# Patient Record
Sex: MALE | Race: WHITE | NOT HISPANIC OR LATINO | Employment: FULL TIME | URBAN - METROPOLITAN AREA
[De-identification: names, ages, dates, MRNs, and addresses within clinical notes are randomized per-mention and may not be internally consistent; named-entity substitution may affect disease eponyms.]

---

## 2018-10-25 ENCOUNTER — TRANSFERRED RECORDS (OUTPATIENT)
Dept: HEALTH INFORMATION MANAGEMENT | Facility: CLINIC | Age: 53
End: 2018-10-25

## 2021-09-15 NOTE — PROGRESS NOTES
ASSESSMENT AND PLAN:     COUNSELING:   Reviewed preventive health counseling, as reflected in patient instructions       Regular exercise       Healthy diet/nutrition       Consider Hep C screening for all patients one time for ages 18-79 years       Prostate cancer screening    (Z00.00) Visit for well man health check  (primary encounter diagnosis)  Comment: Age appropriate screening and preventive services provided.   Discussed risks and benefits of prostate cancer screening with PSA including overdiagnosis risks and risk of missing a prostate cancer diagnosis. Will screen today.     Will request [place] records (for some reason not available in Care Everywhere) to determine what vaccines he needs, colon cancer screening.   Plan: Lipid panel reflex to direct LDL Fasting,         Glucose, Hepatitis C Screen Reflex to HCV RNA         Quant and Genotype, HIV Antigen Antibody Combo,        PSA tumor marker          (Z23) Need for prophylactic vaccination and inoculation against influenza  Comment:   Plan: INFLUENZA QUAD, RECOMBINANT, P-FREE (RIV4)         (FLUBLOK), CANCELED: FLU VACCINE, 3 YRS +, IM          [92743]          (G62.9) Neuropathy  Comment: Stable. Has previously seen neurology for this. Consistent for sensory symmetric polyneuropathy. Will get previous records of evaluation.   Plan:     (E78.00) Hypercholesteremia  Comment: Update lipids. Continue current regimen for now.   Plan: rosuvastatin (CRESTOR) 10 MG tablet          (M20.41) Hammertoe of right foot  Comment: Discussed going back to podiatry to readdress need for custom orthotics and/or surgery. Deformity is definitely impacting his activity level and the frequent skin breakdown puts him at risk for infection. For now, he wishes to continue as he has been. I will continue to readdress this at subsequent visits.   Plan:     (N52.9) Erectile dysfunction, unspecified erectile dysfunction type  Comment: Stable. Refilled. Encouraged him to try lower  "dose of sildenafil to see if it is as effective without causing headaches.   Plan: sildenafil (REVATIO) 20 MG tablet          (K21.9) Gastroesophageal reflux disease without esophagitis  Comment: Stable. Rare use of PPI.   Plan:     Bala Villasenor MD  [facility]  09/17/2021, 11:33 AM      SUBJECTIVE:   Markell is a 55 year old male who presents to clinic today to establish care and for annual wellness exam.    # Right, Hammertoe  - looses big toe nail  - has seen podiatry in the past  - has been offered surgical repair  - wears good shoes with open toebox  - uses vaseline on toes to reduce friction before long walks or else he gets a blister  - hasn't used custom orthotics    # Neuropathy  - bottom of both feet  - going on for about 10 years  - has had extensive evaluation in the past without a clear cause  - sometimes on brisk walks if he stops, it will feel more tingly  - sometimes when walking will \"catch\" his right foot while walking - [place]  - takes folic acid for this  - seems to have improved recently    # GERD  - symptoms only every couple of months  - takes omeprazole as needed with good relief    # ED  - takes sildenafil 60mg (3x 20mg)  - sometimes gets a headache with it  - has never tried a lower dose    # Scalp bump  - cline noticed it  - no history of skin cancer  - no family history of skin cancer     # Health Maintenance  - HIV Screening: do today  - Hep C Screening: do today  - BP:   BP Readings from Last 3 Encounters:   09/17/21 127/76   - Cholesterol: update today  - Diabetes Screening: has been told he has elevated blood sugars in the past  - PSA: do today  - Colonoscopy: will get records    - Exercise: mostly walking, 20-30 minutes at a time about 5 days a week  - hammertoes limit the intensity of the walks, if able likes to walk briskly enough to sweat  - has stationary bike at home      Today's PHQ-2 Score:   PHQ-2 ( 1999 Pfizer) 9/17/2021   Q1: " Little interest or pleasure in doing things 0   Q2: Feeling down, depressed or hopeless 0   PHQ-2 Score 0     Review of Systems:   Constitutional - no fevers, chills, night sweats, unintentional weight loss/gain   Eyes - no vision concerns   Ears/Nose/Throat - no hearing concerns, no dysphagia/odynophagia   Cardiovascular - no chest pain, palpitations   Pulmonary - no shortness of breath, wheezing, coughing   GI - no abdominal pain, constipation, diarrhea, nausea, vomiting    - no dysuria, polyuria, hematuria   Musculoskeletal - has had RTC repair bilaterally, doing well with them.. Occasional low back pain - keeps him from running. no joint or muscle pain  Integument - no rash   Neuro - as above   Heme - no easy bruising/bleeding   Endocrine - no polyuria, weight loss/gain, dry skin, excessive sweating, hair loss   Psychiatric - no feelings of depressed mood or anhedonia in past 2 weeks   Allergic/Immunologic - no history of anaphylaxis, no history of recurrent infections    Past Medical History:   Diagnosis Date     ED (erectile dysfunction)      Gastroesophageal reflux disease without esophagitis      Hammertoe of right foot      Hypercholesteremia      Neuropathy      Past Surgical History:   Procedure Laterality Date     DISCECTOMY LUMBAR MINIMALLY INVASIVE ONE LEVEL  2000     ROTATOR CUFF REPAIR RT/LT Left      ROTATOR CUFF REPAIR RT/LT Right      Family History   Problem Relation Age of Onset     Hammer toes Mother      Hyperlipidemia Father      Insulin Resistance Father      Hyperlipidemia Paternal Grandfather      Diabetes Type 2  Paternal Uncle      Peripheral Neuropathy Paternal Uncle      Coronary Artery Disease No family hx of      Cerebrovascular Disease No family hx of      Prostate Cancer No family hx of      Colon Cancer No family hx of      Social History     Tobacco Use     Smoking status: Never Smoker     Smokeless tobacco: Never Used   Vaping Use     Vaping Use: Never used   Substance Use  "Topics     Alcohol use: Yes     Comment: Once a week, 2-3 beers at a time     Drug use: Never     Social History     Social History Narrative    Recently moved to [place] from [place]    Currently with daughter while house being rennovated    Works in supply chain distribution, works remotely       Current Outpatient Medications   Medication     Ascorbic Acid (VITAMIN C PO)     Cholecalciferol (VITAMIN D3 PO)     Coenzyme Q10 (COQ10 PO)     FOLIC ACID PO     Glucosamine HCl (GLUCOSAMINE PO)     Multiple Vitamins-Minerals (ZINC PO)     Omega-3 Fatty Acids (FISH OIL PO)     rosuvastatin (CRESTOR) 10 MG tablet     sildenafil (REVATIO) 20 MG tablet     No current facility-administered medications for this visit.     I have reviewed the patient's past medical, surgical, family, and social history.     OBJECTIVE:   /76 (BP Location: Right arm, Patient Position: Sitting, Cuff Size: Adult Large)   Pulse 56   Temp 97.1  F (36.2  C) (Skin)   Resp 13   Ht 1.965 m (6' 5.36\")   Wt 108.3 kg (238 lb 12 oz)   SpO2 96%   BMI 28.05 kg/m      Constitutional: well-appearing, appears stated age  Eyes: conjunctivae without erythema, sclera anicteric. Pupils equal, round, and reactive to light.   ENT: oropharynx clear, TM grey bilateral  Cardiac: regular rate and rhythm, normal S1/S2, no murmur/rubs/gallops  Respiratory: lungs clear to auscultation bilaterally, normal work of breathing, no wheezes/crackles  GI: abdomen soft, non-tender, non-distended  Extremities: warm and well perfused, radial pulses 2+ and equal, cap refill brisk.  Hammertoe deformity on right, most prominent on 1st-3rd toes, nail deformity on these toes as well  Lymph: no cervical or supraclavicular lymphadenopathy  Skin: approx 5mm tan papule on left anterior scalp - symmetric, even coloring throughout, crisp border, has hairs growing through it.   Psych: affect is full and appropriate, speech is fluent and non-pressured  Neuro: dorsiflexion of ankles 5/5 " and symmetric  Absent sensation with paperclip below distal tib/fib

## 2021-09-17 ENCOUNTER — OFFICE VISIT (OUTPATIENT)
Dept: FAMILY MEDICINE | Facility: CLINIC | Age: 56
End: 2021-09-17
Payer: COMMERCIAL

## 2021-09-17 VITALS
BODY MASS INDEX: 28.19 KG/M2 | DIASTOLIC BLOOD PRESSURE: 76 MMHG | HEIGHT: 77 IN | HEART RATE: 56 BPM | WEIGHT: 238.75 LBS | SYSTOLIC BLOOD PRESSURE: 127 MMHG | TEMPERATURE: 97.1 F | RESPIRATION RATE: 13 BRPM | OXYGEN SATURATION: 96 %

## 2021-09-17 DIAGNOSIS — N52.9 ERECTILE DYSFUNCTION, UNSPECIFIED ERECTILE DYSFUNCTION TYPE: ICD-10-CM

## 2021-09-17 DIAGNOSIS — M20.41 HAMMERTOE OF RIGHT FOOT: ICD-10-CM

## 2021-09-17 DIAGNOSIS — Z00.00 VISIT FOR WELL MAN HEALTH CHECK: Primary | ICD-10-CM

## 2021-09-17 DIAGNOSIS — G62.9 NEUROPATHY: ICD-10-CM

## 2021-09-17 DIAGNOSIS — Z23 NEED FOR PROPHYLACTIC VACCINATION AND INOCULATION AGAINST INFLUENZA: ICD-10-CM

## 2021-09-17 DIAGNOSIS — E78.00 HYPERCHOLESTEREMIA: ICD-10-CM

## 2021-09-17 DIAGNOSIS — K21.9 GASTROESOPHAGEAL REFLUX DISEASE WITHOUT ESOPHAGITIS: ICD-10-CM

## 2021-09-17 LAB
CHOLEST SERPL-MCNC: 191 MG/DL
FASTING STATUS PATIENT QL REPORTED: YES
FASTING STATUS PATIENT QL REPORTED: YES
GLUCOSE BLD-MCNC: 104 MG/DL (ref 70–99)
HDLC SERPL-MCNC: 59 MG/DL
LDLC SERPL CALC-MCNC: 108 MG/DL
NONHDLC SERPL-MCNC: 132 MG/DL
PSA SERPL-MCNC: 0.64 UG/L (ref 0–4)
TRIGL SERPL-MCNC: 120 MG/DL

## 2021-09-17 PROCEDURE — 86803 HEPATITIS C AB TEST: CPT | Performed by: FAMILY MEDICINE

## 2021-09-17 PROCEDURE — 84153 ASSAY OF PSA TOTAL: CPT | Performed by: FAMILY MEDICINE

## 2021-09-17 PROCEDURE — 87389 HIV-1 AG W/HIV-1&-2 AB AG IA: CPT | Performed by: FAMILY MEDICINE

## 2021-09-17 PROCEDURE — 80061 LIPID PANEL: CPT | Performed by: FAMILY MEDICINE

## 2021-09-17 PROCEDURE — 82947 ASSAY GLUCOSE BLOOD QUANT: CPT | Performed by: FAMILY MEDICINE

## 2021-09-17 RX ORDER — SILDENAFIL CITRATE 20 MG/1
60 TABLET ORAL
Qty: 72 TABLET | Refills: 3 | Status: SHIPPED | OUTPATIENT
Start: 2021-09-17 | End: 2021-10-02

## 2021-09-17 RX ORDER — SILDENAFIL CITRATE 20 MG/1
60 TABLET ORAL
COMMUNITY
End: 2021-09-17

## 2021-09-17 RX ORDER — ROSUVASTATIN CALCIUM 10 MG/1
10 TABLET, COATED ORAL DAILY
Qty: 90 TABLET | Refills: 3 | Status: SHIPPED | OUTPATIENT
Start: 2021-09-17 | End: 2022-09-15

## 2021-09-17 RX ORDER — ROSUVASTATIN CALCIUM 10 MG/1
10 TABLET, COATED ORAL DAILY
COMMUNITY
End: 2021-09-17

## 2021-09-17 ASSESSMENT — MIFFLIN-ST. JEOR: SCORE: 2036.08

## 2021-09-17 NOTE — NURSING NOTE
"55 year old  Chief Complaint   Patient presents with     Physical     Hyperlipidemia     Erectile Dysfunction     Mass     left side top of head     Hammer Toe     right foot     NEUROPATHY     bottom of feet       Blood pressure 127/76, pulse 56, temperature 97.1  F (36.2  C), temperature source Skin, resp. rate 13, height 1.965 m (6' 5.36\"), weight 108.3 kg (238 lb 12 oz), SpO2 96 %. Body mass index is 28.05 kg/m .  There is no problem list on file for this patient.      Wt Readings from Last 2 Encounters:   09/17/21 108.3 kg (238 lb 12 oz)     BP Readings from Last 3 Encounters:   09/17/21 127/76         Current Outpatient Medications   Medication     rosuvastatin (CRESTOR) 10 MG tablet     sildenafil (REVATIO) 20 MG tablet     No current facility-administered medications for this visit.       Social History     Tobacco Use     Smoking status: Never Smoker     Smokeless tobacco: Never Used   Vaping Use     Vaping Use: Never used   Substance Use Topics     Alcohol use: Yes     Drug use: Never       Health Maintenance Due   Topic Date Due     PREVENTIVE CARE VISIT  Never done     ADVANCE CARE PLANNING  Never done     COLORECTAL CANCER SCREENING  Never done     HIV SCREENING  Never done     HEPATITIS C SCREENING  Never done     DTAP/TDAP/TD IMMUNIZATION (1 - Tdap) Never done     LIPID  Never done     ZOSTER IMMUNIZATION (1 of 2) Never done     INFLUENZA VACCINE (1) Never done       No results found for: PAP      September 17, 2021 11:05 AM    "

## 2021-09-17 NOTE — PATIENT INSTRUCTIONS
1) Check to see when you last had your tetanus booster    2) If I haven't messaged you within a month that I've gotten and reviewed your [place] records, I want you to message me about it in [facility]

## 2021-09-20 LAB
HCV AB SERPL QL IA: NONREACTIVE
HIV 1+2 AB+HIV1 P24 AG SERPL QL IA: NONREACTIVE

## 2021-10-18 ENCOUNTER — OFFICE VISIT (OUTPATIENT)
Dept: FAMILY MEDICINE | Facility: CLINIC | Age: 56
End: 2021-10-18
Payer: COMMERCIAL

## 2021-10-18 VITALS
BODY MASS INDEX: 29.09 KG/M2 | SYSTOLIC BLOOD PRESSURE: 137 MMHG | DIASTOLIC BLOOD PRESSURE: 85 MMHG | WEIGHT: 247.6 LBS | OXYGEN SATURATION: 99 % | HEART RATE: 63 BPM

## 2021-10-18 DIAGNOSIS — R30.0 DYSURIA: ICD-10-CM

## 2021-10-18 DIAGNOSIS — N34.2 URETHRITIS: Primary | ICD-10-CM

## 2021-10-18 LAB
ALBUMIN UR-MCNC: NEGATIVE MG/DL
APPEARANCE UR: CLEAR
BILIRUB UR QL STRIP: NEGATIVE
COLOR UR AUTO: YELLOW
GLUCOSE UR STRIP-MCNC: NEGATIVE MG/DL
HGB UR QL STRIP: NEGATIVE
KETONES UR STRIP-MCNC: NEGATIVE MG/DL
LEUKOCYTE ESTERASE UR QL STRIP: NEGATIVE
NITRATE UR QL: NEGATIVE
PH UR STRIP: 5.5 [PH] (ref 5–7)
SP GR UR STRIP: 1.01 (ref 1–1.03)
UROBILINOGEN UR STRIP-ACNC: 0.2 E.U./DL

## 2021-10-18 PROCEDURE — 81003 URINALYSIS AUTO W/O SCOPE: CPT | Performed by: FAMILY MEDICINE

## 2021-10-18 PROCEDURE — 99213 OFFICE O/P EST LOW 20 MIN: CPT | Performed by: FAMILY MEDICINE

## 2021-10-18 RX ORDER — PHENAZOPYRIDINE HYDROCHLORIDE 95 MG/1
190 TABLET ORAL 3 TIMES DAILY PRN
Qty: 30 TABLET | Refills: 0 | Status: SHIPPED | OUTPATIENT
Start: 2021-10-18 | End: 2022-04-27

## 2021-10-18 RX ORDER — SULFAMETHOXAZOLE/TRIMETHOPRIM 800-160 MG
TABLET ORAL
COMMUNITY
Start: 2021-10-12 | End: 2022-04-27

## 2021-10-18 NOTE — PROGRESS NOTES
Assessment & Plan       ICD-10-CM    1. Urethritis  N34.2 phenazopyridine (AZO URINARY PAIN RELIEF) 95 MG tablet   2. Dysuria  R30.0 UA macro with reflex to Microscopic and Culture - Clinc Collect         Review of external notes as documented elsewhere in note          Patient Instructions   Patient Education     Markell    It was a pleasure seeing you in clinic today.  Here's the plan:    1. UTI - repeat UA 1 week after stopping antibiotics if symptoms persist or worsen.  You likely have residual urethral irritation.    Let me know if you have questions.    Ganesh Figueroa MD        Return in about 1 week (around 10/25/2021) for If symptoms persist.    Ganesh Figueroa MD  [facility] XAVI Guillaume is a 56 year old who presents for the following health issues     HPI     Genitourinary - Male  Onset/Duration: 2 weeks  Description:   Dysuria (painful urination): YES}  Hematuria (blood in urine): no  Frequency: no  Waking at night to urinate: YES  Hesitancy (delay in urine): no  Retention (unable to empty): no  Decrease in urinary flow: no  Incontinence: no  Progression of Symptoms:  improving  Accompanying Signs & Symptoms:  Fever: no  Back/Flank pain: no  Urethral discharge: no  Testicle lumps/masses/pain: no  Nausea and/or vomiting: no  Abdominal pain: no  History:   History of frequent UTI s: no  History of kidney stones: no  History of hernias: no  Personal or Family history of Prostate problems: no  Sexually active: YES  Precipitating or alleviating factors: None  Therapies tried and outcome: course of antibiotics - Sulfamethoxazole and Ceftriaxone sodium injection      2 weeks  Irritated feeling in urethra  Urinary frequency  UA in [place] showed wbc - rocephin inj and bactrim tabs  Still has residual irritation  1 week ago, last day of bactrim is tomorrow        Review of Systems   Constitutional, HEENT, cardiovascular, pulmonary, gi and gu systems are negative, except as  otherwise noted.      Objective    /85   Pulse 63   Wt 112.3 kg (247 lb 9.6 oz)   SpO2 99%   BMI 29.09 kg/m    Body mass index is 29.09 kg/m .  Physical Exam   GENERAL: healthy, alert and no distress  EYES: Eyes grossly normal to inspection, PERRL and conjunctivae and sclerae normal  NECK: no adenopathy, no asymmetry, masses, or scars and thyroid normal to palpation  SKIN: no suspicious lesions or rashes  PSYCH: mentation appears normal, affect normal/bright

## 2021-10-18 NOTE — PATIENT INSTRUCTIONS
Patient Education     Markell    It was a pleasure seeing you in clinic today.  Here's the plan:    1. UTI - repeat UA 1 week after stopping antibiotics if symptoms persist or worsen.  You likely have residual urethral irritation.    Let me know if you have questions.    Ganesh Figueroa MD

## 2021-12-03 ENCOUNTER — IMMUNIZATION (OUTPATIENT)
Dept: NURSING | Facility: CLINIC | Age: 56
End: 2021-12-03
Payer: COMMERCIAL

## 2021-12-03 PROCEDURE — 0064A COVID-19,PF,MODERNA (18+ YRS BOOSTER .25ML): CPT

## 2021-12-03 PROCEDURE — 91306 COVID-19,PF,MODERNA (18+ YRS BOOSTER .25ML): CPT

## 2022-04-27 NOTE — PROGRESS NOTES
ASSESSMENT AND PLAN:     (G62.9) Neuropathy  (primary encounter diagnosis)  Comment: Chronic, worsening. Progressively worsening symmetric polyneuropathy. Get EMG/NCS as recommended by previous neurologist.   Plan: EMG (Neurology Clinic)          (B36.0) Tinea versicolor  Comment: New diagnosis. Appearance consistent with tinea versicolor. Differential includes pityriasis rosea but no herald patch, nummular eczema. Treat with topical ketoconazole shampoo - daily until improved and then can use 1-2 times a week to prevent recurrence.   Plan: ketoconazole (NIZORAL) 2 % external shampoo          Review of the result(s) of each unique test - see note body for each test reviewed    Bala Villasenor MD   [facility]  04/28/2022, 12:35 PM      SUBJECTIVE:   Markell is a 56 year old male who presents to clinic today for a return visit.    # Rash  - for about 6 weeks, on his back  - temporarily staying at aunt's place, using different soaps, detergents  - not itchy or painful    # Neuropathy  - started about 10 years ago  - tingling sensation in bottom of both feet  - hasn't been walking as much (more stationary bike in the winter) so hasn't noticed as much worsening with walking  - feels like feet are swollen even when they are not  - has noticed the decreased sensation progressively moving up from feet into ankles    - last saw neurology at [facility] 4/16/21, at that time was recommended to start folate supplement and get EMG/NCS     - 5/17/21 B12 1851, MMA 0.05, homocysteine 7  - 5/17/21 Folate 13.8  - 3/3/21 Folate 4.9    - 3/3/21 SPEP normal  - 3/3/21 UPEP w/ immunofixation - no monoclonal protein detected  - 3/3/21 Borrelia Ab negative  - 3/3/21  (RR <=397), ESR 10  - 3/3/21 YOVANA <1:80, RF <10, CCP IgG <3.0  - 3/3/21 Calcium 9.4, Albumin 4.79, Na 139, K 4.2, Mag 1.9  - 3/3/21 TSH 1.66  - 3/3/21 HIV nonreactive, TPA Ab nonreactive    3/3/21 XR Lumbosacral Spine  Vertebral bodies are  "normal in height and alignment.  Moderate           degenerative disc disease at L5-S1 with disc height loss, endplate       osteophytes and facet hypertrophy.                                       Intervertebral disc spaces are well preserved, symmetric and             maintained.                                                              No soft tissue abnormality is identified.              # Prediabetes  - 9/17/21 Fasting Glucose 104  - 3/3/21 A1c 5.8%  - 10/22/19 A1c 5.9%  - 10/11/18 A1c 5.9%      Patient Active Problem List   Diagnosis     Neuropathy     Hypercholesteremia     Hammertoe of right foot     ED (erectile dysfunction)     Gastroesophageal reflux disease without esophagitis     Current Outpatient Medications   Medication     Ascorbic Acid (VITAMIN C PO)     Cholecalciferol (VITAMIN D3 PO)     Coenzyme Q10 (COQ10 PO)     FOLIC ACID PO     Glucosamine HCl (GLUCOSAMINE PO)     ketoconazole (NIZORAL) 2 % external shampoo     Multiple Vitamins-Minerals (ZINC PO)     Omega-3 Fatty Acids (FISH OIL PO)     rosuvastatin (CRESTOR) 10 MG tablet     sildenafil (REVATIO) 20 MG tablet     No current facility-administered medications for this visit.       I have reviewed the patient's relevant past medical history.     OBJECTIVE:   /88   Pulse 58   Temp 97.4  F (36.3  C)   Ht 1.965 m (6' 5.36\")   Wt 112.5 kg (248 lb)   SpO2 98%   BMI 29.13 kg/m      Constitutional: well-appearing, appears stated age  Eyes: conjunctivae without erythema, sclera anicteric.     Skin: multiple flat     Neuro: loss of cold sensation in feet - intact midway up calves  Loss of vibration sensation in feet - intact at medial malleolus  Loss of sharp/dull distinction at distal third of lower leg and in feet  Loss of 10g monofilament sensation at distal third of lower leg and feet  "

## 2022-04-28 ENCOUNTER — OFFICE VISIT (OUTPATIENT)
Dept: FAMILY MEDICINE | Facility: CLINIC | Age: 57
End: 2022-04-28
Payer: COMMERCIAL

## 2022-04-28 VITALS
SYSTOLIC BLOOD PRESSURE: 137 MMHG | HEIGHT: 77 IN | TEMPERATURE: 97.4 F | DIASTOLIC BLOOD PRESSURE: 88 MMHG | OXYGEN SATURATION: 98 % | BODY MASS INDEX: 29.28 KG/M2 | HEART RATE: 58 BPM | WEIGHT: 248 LBS

## 2022-04-28 DIAGNOSIS — B36.0 TINEA VERSICOLOR: ICD-10-CM

## 2022-04-28 DIAGNOSIS — G62.9 NEUROPATHY: Primary | Chronic | ICD-10-CM

## 2022-04-28 RX ORDER — KETOCONAZOLE 20 MG/ML
SHAMPOO TOPICAL DAILY PRN
Qty: 120 ML | Refills: 3 | Status: SHIPPED | OUTPATIENT
Start: 2022-04-28 | End: 2022-09-15

## 2022-05-15 ENCOUNTER — E-VISIT (OUTPATIENT)
Dept: FAMILY MEDICINE | Facility: CLINIC | Age: 57
End: 2022-05-15
Payer: COMMERCIAL

## 2022-05-15 DIAGNOSIS — Z53.9 ERRONEOUS ENCOUNTER--DISREGARD: Primary | ICD-10-CM

## 2022-05-18 ENCOUNTER — OFFICE VISIT (OUTPATIENT)
Dept: FAMILY MEDICINE | Facility: CLINIC | Age: 57
End: 2022-05-18
Payer: COMMERCIAL

## 2022-05-18 VITALS
WEIGHT: 250 LBS | HEART RATE: 60 BPM | SYSTOLIC BLOOD PRESSURE: 120 MMHG | DIASTOLIC BLOOD PRESSURE: 73 MMHG | BODY MASS INDEX: 29.37 KG/M2 | OXYGEN SATURATION: 97 %

## 2022-05-18 DIAGNOSIS — L03.031 CELLULITIS OF TOE OF RIGHT FOOT: Primary | ICD-10-CM

## 2022-05-18 DIAGNOSIS — M79.89 FOOT SWELLING: ICD-10-CM

## 2022-05-18 LAB
ANION GAP SERPL CALCULATED.3IONS-SCNC: 7 MMOL/L (ref 5–18)
BUN SERPL-MCNC: 14 MG/DL (ref 8–22)
CALCIUM SERPL-MCNC: 9.5 MG/DL (ref 8.5–10.5)
CHLORIDE BLD-SCNC: 106 MMOL/L (ref 98–107)
CO2 SERPL-SCNC: 28 MMOL/L (ref 22–31)
CREAT SERPL-MCNC: 1.07 MG/DL (ref 0.7–1.3)
GFR SERPL CREATININE-BSD FRML MDRD: 81 ML/MIN/1.73M2
GLUCOSE BLD-MCNC: 98 MG/DL (ref 70–125)
POTASSIUM BLD-SCNC: 4.2 MMOL/L (ref 3.5–5)
SODIUM SERPL-SCNC: 141 MMOL/L (ref 136–145)
URATE SERPL-MCNC: 4.8 MG/DL (ref 3–8)

## 2022-05-18 PROCEDURE — 84550 ASSAY OF BLOOD/URIC ACID: CPT | Performed by: FAMILY MEDICINE

## 2022-05-18 PROCEDURE — 99213 OFFICE O/P EST LOW 20 MIN: CPT | Performed by: FAMILY MEDICINE

## 2022-05-18 PROCEDURE — 36415 COLL VENOUS BLD VENIPUNCTURE: CPT | Performed by: FAMILY MEDICINE

## 2022-05-18 PROCEDURE — 80048 BASIC METABOLIC PNL TOTAL CA: CPT | Performed by: FAMILY MEDICINE

## 2022-05-18 RX ORDER — CEPHALEXIN 500 MG/1
500 CAPSULE ORAL 3 TIMES DAILY
Qty: 21 CAPSULE | Refills: 0 | Status: SHIPPED | OUTPATIENT
Start: 2022-05-18 | End: 2022-05-25

## 2022-05-18 NOTE — PROGRESS NOTES
Markell was seen today for recheck medication, musculoskeletal problem and other.    Diagnoses and all orders for this visit:    Cellulitis of toe of right foot  -     cephALEXin (KEFLEX) 500 MG capsule; Take 1 capsule (500 mg) by mouth 3 times daily for 7 days  -     Orthopedic  Referral; Future  There does not appear to be fluctuance but swelling around the toenail bed.  This is not tender to the patient.  We will treat empirically for infection.  Recommend warm soaks twice daily.  Recommend a referral to orthopedics to address his concerns regarding his hammertoe, ingrown toenail, and for follow-up in the case that this evolves into an abscess.    Foot swelling  -     Uric acid; Future  -     Basic metabolic panel  (Ca, Cl, CO2, Creat, Gluc, K, Na, BUN); Future  Patient notes that the swelling did extend to the foot.  Will evaluate for gout.          Subjective   Markell is a 56 year old who presents for the following health issues Right toe swelling.      HPI   Patient was out of town and went for walks.  Right great toe swollen since last week after going for walks.  Got worse over the weekend, swelling spread to the foot.  No fevers or chills. History of hammertoe and ingrown toenails.  He did clip down the toenail  Toe nail started curving up the last couple of weeks.  No effusion.  No prior history of gout.  He has a history of peripheral neuropathy and prediabetes.        Objective    /73 (BP Location: Left arm, Patient Position: Sitting, Cuff Size: Adult Large)   Pulse 60   Wt 113.4 kg (250 lb)   SpO2 97%   BMI 29.37 kg/m    Body mass index is 29.37 kg/m .  Physical Exam   General: Patient is alert and oriented no acute distress  Extremities: Right foot  Inspection: Patient has a hammertoe of the right great toe.  He does have a dystrophic toenail.  There does not appear to be an ingrown toenail at this point.  There is erythema surrounding the nailbed.   Palpation:  It is slightly swollen,  but nonfluctuant.  This is not tender to palpation.  No tenderness to palpation of the nailbed or the pulp of the toe.    Circulation: cap refill is sluggish.  Pulses are 2+

## 2022-05-25 ENCOUNTER — OFFICE VISIT (OUTPATIENT)
Dept: PODIATRY | Facility: CLINIC | Age: 57
End: 2022-05-25
Payer: COMMERCIAL

## 2022-05-25 VITALS
WEIGHT: 247 LBS | HEIGHT: 77 IN | BODY MASS INDEX: 29.16 KG/M2 | DIASTOLIC BLOOD PRESSURE: 75 MMHG | SYSTOLIC BLOOD PRESSURE: 116 MMHG

## 2022-05-25 DIAGNOSIS — M20.31 HALLUX MALLEUS, RIGHT: ICD-10-CM

## 2022-05-25 DIAGNOSIS — Q66.71 PES CAVUS OF RIGHT FOOT: ICD-10-CM

## 2022-05-25 DIAGNOSIS — L03.031 CELLULITIS OF RIGHT TOE: Primary | ICD-10-CM

## 2022-05-25 PROCEDURE — 99203 OFFICE O/P NEW LOW 30 MIN: CPT | Performed by: PODIATRIST

## 2022-05-25 NOTE — LETTER
5/25/2022         RE: Markell Toussaint  [address] [address]        Dear Colleague,    Thank you for referring your patient, Markell Toussaint, to the [facility]. Please see a copy of my visit note below.    Assessment:      ICD-10-CM    1. Cellulitis of right toe  L03.031    2. Pes cavus of right foot  Q66.71 Orthopedic  Referral   3. Hallux malleus, right  M20.31         Plan:  No orders of the defined types were placed in this encounter.      Discussed the etiology and treatment of the condition with the patient.  Imaging studies reviewed and discussed with the patient.  Discussed surgical and conservative options.    Pes cavus, hallux malleus, likely related to his neuropathy / neuro condition.  -Keep Neuro appt  Discussed hallux malleus correction - HIPJ fusion + Kolb tenosuspension if needed  WB foot x-rays at follow-up if he decides to proceed      Return:  No follow-ups on file.    Pushpa Mehta DPM                Chief Complaint:     Patient presents with:  Right Great Toe - Pain     right foot     HPI:  Markell Toussaint is a 56 year old year old male who presents for evaluation of foot pain.    Patient states he has had an infected right toe, and he has a hammer toe there as well.  His CC is the right great toe.  He has just finished antibiotics for his toe infection this morning.  He was previously seen by a physician in [place] for this issue.      Abx from PCP.  Has happened 3 times before.  He is seeing neurologist currently, this work-up was started at [facility].  No family Hx of NM disease.       Uric Acid   Date Value Ref Range Status   05/18/2022 4.8 3.0 - 8.0 mg/dL Final         Past Medical & Surgical History:  Past Medical History:   Diagnosis Date     ED (erectile dysfunction)      Gastroesophageal reflux disease without esophagitis      Hammertoe of right foot      Hypercholesteremia      Neuropathy       Past Surgical History:   Procedure  "Laterality Date     DISCECTOMY LUMBAR MINIMALLY INVASIVE ONE LEVEL  2000     ROTATOR CUFF REPAIR RT/LT Left      ROTATOR CUFF REPAIR RT/LT Right       Family History   Problem Relation Age of Onset     Hammer toes Mother      Hyperlipidemia Father      Insulin Resistance Father      Hyperlipidemia Paternal Grandfather      Diabetes Type 2  Paternal Uncle      Peripheral Neuropathy Paternal Uncle      Coronary Artery Disease No family hx of      Cerebrovascular Disease No family hx of      Prostate Cancer No family hx of      Colon Cancer No family hx of         Social History:  ?  History   Smoking Status     Never Smoker   Smokeless Tobacco     Never Used     History   Drug Use Unknown     Social History    Substance and Sexual Activity      Alcohol use: Yes        Comment: Once a week, 2-3 beers at a time      Allergies:  ?   No Known Allergies     Medications:    Current Outpatient Medications   Medication     Ascorbic Acid (VITAMIN C PO)     cephALEXin (KEFLEX) 500 MG capsule     Cholecalciferol (VITAMIN D3 PO)     Coenzyme Q10 (COQ10 PO)     FOLIC ACID PO     Glucosamine HCl (GLUCOSAMINE PO)     ketoconazole (NIZORAL) 2 % external shampoo     Multiple Vitamins-Minerals (ZINC PO)     Omega-3 Fatty Acids (FISH OIL PO)     rosuvastatin (CRESTOR) 10 MG tablet     sildenafil (REVATIO) 20 MG tablet     No current facility-administered medications for this visit.       Physical Exam:  ?  Vitals:  /75   Ht 1.956 m (6' 5\")   Wt 112 kg (247 lb)   BMI 29.29 kg/m     General:  WD/WN, in NAD.  A&O x3.  Dermatologic:    Skin is intact, open lesions absent.   Skin texture, turgor is normal.  Onycholysis, subungual hematoma R hallux.  Vascular:  Pulses palpable bilateral.  Digital capillary refill time normal bilateral.  Skin temperature is normal right.  Generalized edema- none bilateral.  Focal edema- mild hallux right.  Neurologic:    Gross sensation normal.  Gait and balance normal.  Musculoskeletal:  Reducible " hallux malleus R, none L  Reducible claw toes b/l, R > L  Ankle PF produces PL overdrive b/l  Ankle ROM smooth, nonpainful right.  Ankle unstable to drawer, tilt, R  STJ, MTJ ROM full, pain free right.    FF:RF:  PF 1st ray bilateral.  Stance:  RCSP Varus bilateral.  Foot type:  cavus  Clinical deformity: claw toes b/l, cavus foot                  Again, thank you for allowing me to participate in the care of your patient.        Sincerely,        RHYS GallegosM

## 2022-05-25 NOTE — PATIENT INSTRUCTIONS
PATIENT INSTRUCTIONS - Podiatry / Foot & Ankle Surgery    Cellulitis is resolved    Shoe with seamless upper, wide toe box  Crest pad to big toe - online - amazon, etc    Altra appiah or similar knit upper    Follow-up with neurology about neuropathy - directly related to pes cavus artchitecture    We will obtain standing x-rays at follow-up

## 2022-05-25 NOTE — PROGRESS NOTES
Assessment:      ICD-10-CM    1. Cellulitis of right toe  L03.031    2. Pes cavus of right foot  Q66.71 Orthopedic  Referral   3. Hallux malleus, right  M20.31         Plan:  No orders of the defined types were placed in this encounter.      Discussed the etiology and treatment of the condition with the patient.  Imaging studies reviewed and discussed with the patient.  Discussed surgical and conservative options.    Pes cavus, hallux malleus, likely related to his neuropathy / neuro condition.  -Keep Neuro appt  Discussed hallux malleus correction - HIPJ fusion + Kolb tenosuspension if needed  WB foot x-rays at follow-up if he decides to proceed      Return:  No follow-ups on file.    Pushpa Mehta DPM                Chief Complaint:     Patient presents with:  Right Great Toe - Pain     right foot     HPI:  Markell Toussaint is a 56 year old year old male who presents for evaluation of foot pain.    Patient states he has had an infected right toe, and he has a hammer toe there as well.  His CC is the right great toe.  He has just finished antibiotics for his toe infection this morning.  He was previously seen by a physician in [place] for this issue.      Abx from PCP.  Has happened 3 times before.  He is seeing neurologist currently, this work-up was started at [facility].  No family Hx of NM disease.       Uric Acid   Date Value Ref Range Status   05/18/2022 4.8 3.0 - 8.0 mg/dL Final         Past Medical & Surgical History:  Past Medical History:   Diagnosis Date     ED (erectile dysfunction)      Gastroesophageal reflux disease without esophagitis      Hammertoe of right foot      Hypercholesteremia      Neuropathy       Past Surgical History:   Procedure Laterality Date     DISCECTOMY LUMBAR MINIMALLY INVASIVE ONE LEVEL  2000     ROTATOR CUFF REPAIR RT/LT Left      ROTATOR CUFF REPAIR RT/LT Right       Family History   Problem Relation Age of Onset     Hammer toes Mother      Hyperlipidemia Father   "    Insulin Resistance Father      Hyperlipidemia Paternal Grandfather      Diabetes Type 2  Paternal Uncle      Peripheral Neuropathy Paternal Uncle      Coronary Artery Disease No family hx of      Cerebrovascular Disease No family hx of      Prostate Cancer No family hx of      Colon Cancer No family hx of         Social History:  ?  History   Smoking Status     Never Smoker   Smokeless Tobacco     Never Used     History   Drug Use Unknown     Social History    Substance and Sexual Activity      Alcohol use: Yes        Comment: Once a week, 2-3 beers at a time      Allergies:  ?   No Known Allergies     Medications:    Current Outpatient Medications   Medication     Ascorbic Acid (VITAMIN C PO)     cephALEXin (KEFLEX) 500 MG capsule     Cholecalciferol (VITAMIN D3 PO)     Coenzyme Q10 (COQ10 PO)     FOLIC ACID PO     Glucosamine HCl (GLUCOSAMINE PO)     ketoconazole (NIZORAL) 2 % external shampoo     Multiple Vitamins-Minerals (ZINC PO)     Omega-3 Fatty Acids (FISH OIL PO)     rosuvastatin (CRESTOR) 10 MG tablet     sildenafil (REVATIO) 20 MG tablet     No current facility-administered medications for this visit.       Physical Exam:  ?  Vitals:  /75   Ht 1.956 m (6' 5\")   Wt 112 kg (247 lb)   BMI 29.29 kg/m     General:  WD/WN, in NAD.  A&O x3.  Dermatologic:    Skin is intact, open lesions absent.   Skin texture, turgor is normal.  Onycholysis, subungual hematoma R hallux.  Vascular:  Pulses palpable bilateral.  Digital capillary refill time normal bilateral.  Skin temperature is normal right.  Generalized edema- none bilateral.  Focal edema- mild hallux right.  Neurologic:    Gross sensation normal.  Gait and balance normal.  Musculoskeletal:  Reducible hallux malleus R, none L  Reducible claw toes b/l, R > L  Ankle PF produces PL overdrive b/l  Ankle ROM smooth, nonpainful right.  Ankle unstable to drawer, tilt, R  STJ, MTJ ROM full, pain free right.    FF:RF:  PF 1st ray bilateral.  Stance:  RCSP " Varus bilateral.  Foot type:  cavus  Clinical deformity: claw toes b/l, cavus foot

## 2022-06-23 ENCOUNTER — OFFICE VISIT (OUTPATIENT)
Dept: NEUROLOGY | Facility: CLINIC | Age: 57
End: 2022-06-23
Attending: FAMILY MEDICINE
Payer: COMMERCIAL

## 2022-06-23 DIAGNOSIS — M54.17 RIGHT LUMBOSACRAL RADICULOPATHY: ICD-10-CM

## 2022-06-23 DIAGNOSIS — G62.89 AXONAL SENSORIMOTOR NEUROPATHY: Primary | ICD-10-CM

## 2022-06-23 DIAGNOSIS — G62.9 NEUROPATHY: Chronic | ICD-10-CM

## 2022-06-23 PROCEDURE — 95886 MUSC TEST DONE W/N TEST COMP: CPT | Performed by: PSYCHIATRY & NEUROLOGY

## 2022-06-23 PROCEDURE — 95913 NRV CNDJ TEST 13/> STUDIES: CPT | Performed by: PSYCHIATRY & NEUROLOGY

## 2022-06-23 NOTE — LETTER
2022       RE: Markell Burton  [address] [facility] [address]     Dear Colleague,    Thank you for referring your patient, Markell Burton, to the [facility] EMG CLINIC [place] at [facility]. Please see a copy of my visit note below.                        [facility]  Electrodiagnostic Laboratory                 Department of Neurology                                                                                                         Test Date:  2022    Patient: Markell Burton : 1965 Physician: Julio Corley MD   Sex: Male AGE: 56 year Ref Phys: Bala Villasenor MD   ID#: [id number]   Technician: Kristy Behling     Clinical Information:    56 year old man with history of numbness and tingling at his feet and lower legs, gradually progressing over 10 years. He also has a history of prior lumbar spine surgery (L5 laminectomy). Query polyneuropathy.     Techniques:    Motor and sensory conduction studies were done with surface recording electrodes. EMG was done with a concentric needle electrode.     Results:    Bilateral fibular (TA) motor NCSs were normal. Bilateral tibial (AH) and left fibular (EDB) motor NCSs showed no responses. Right fibular (EDB) motor NCS showed normal distal latency, extremely attenuated CMAP amplitudes, and normal conduction velocities. Right median (APB) and ulnar (ADM) motor NCSs were normal. Right median, ulnar, and radial antidromic sensory NCSs were normal. Bilateral superficial fibular and sural SNAPs were absent. Needle EMG showed 3+ fibs/PSWs and a CRD at the right medial gastrocnemius; all other muscles showed no abnormal spontaneous activity. Mildly to moderately reduced recruitment of large, long duration MUPs was noted at the right TA, medial gastrocnemius, and short head of biceps femoris. The right vastus lateralis showed large, long duration MUPs with normal  recruitment patterns. The right gluteus medius was normal.     Interpretation:    Abnormal study. There is electrodiagnostic evidence of 1) A length dependent, axonal sensorimotor polyneuropathy of moderate severity, and 2) Chronic right lumbar radiculopathies at L4 and S1 (and possibly L5).     ___________________________  Julio Corley MD        Nerve Conduction Studies  Motor Sites      Latency Amplitude Neg. Amp Diff Segment Distance Velocity Neg. Dur Neg Area Diff Temperature Comment   Site (ms) Norm (mV) Norm %  cm m/s Norm ms %  C    Left Dp Branch Fibular (TA) Motor   Fib Head 5.1  < 6.0 4.2 -      10.4  32.5    Pop Fossa 7.1  < 5.7 3.3 - -21.4 Pop Fossa-Fib Head 10 50 - 10.4 -21.3 32.6    Right Dp Branch Fibular (TA) Motor   Fib Head 4.6  < 6.0 4.1 -      10.2  31.9    Pop Fossa 6.7  < 5.7 3.4 - -17.1 Pop Fossa-Fib Head 10 48 - 10.5 -13.2 31.9    Left Fibular (EDB) Motor   Ankle NR  < 6.0 NR  > 2.5  Ankle-EDB 8   NR  32.2    Right Fibular (EDB) Motor   Ankle 3.4  < 6.0 0.31  > 2.5  Ankle-EDB 8   6.0  31.7    Bel Fib Head 14.7 - 0.30 - -3.2 Bel Fib Head-Ankle 48 42  > 38 9.6 11.8 31.7    Pop Fossa 17.7 - 0.27 - -10.0 Pop Fossa-Bel Fib Head 12 40  > 38 8.9 -5.8 31.7    Right Median (APB) Motor   Wrist 4.3  < 4.4 8.3  > 5.0  Wrist-APB 8   5.9  32.9    Elbow 9.4 - 7.4 - -10.8 Elbow-Wrist 27 53  > 48 6.2 -8.8 32.9    Left Tibial (AHB) Motor   Ankle NR  < 6.5 NR  > 4.4  Ankle-AHB 8   NR  31.9    Right Tibial (AHB) Motor   Ankle NR  < 6.5 NR  > 4.4  Ankle-AHB 8   NR  31.9    Right Ulnar (ADM) Motor   Wrist 3.3  < 3.5 9.9  > 5.0  Wrist-ADM 8   6.4  32.9    Bel Elbow 7.8 - 9.7 - -2.0 Bel Elbow-Wrist 25 56  > 48 6.8 -6.5 32.9    Abv Elbow 10.2 - 9.6 - -1.03 Abv Elbow-Bel Elbow 12 50  > 48 6.9 0.27 33      Sensory Sites      Onset Lat Peak Lat Amp (O-P) Amp (P-P) Segment Distance Velocity Temperature Comment   Site ms ms  V Norm  V  cm m/s Norm  C    Right Median Sensory   Wrist-Dig II 2.8 3.6 15  > 10 23  Wrist-Dig II 14 50  > 48 33    Right Radial Sensory   Forearm-Wrist 2.2 2.9 15  > 15 18 Forearm-Wrist 12.5 57 - 32.5    Left Superficial Fibular Sensory   14 cm-Ankle NR NR NR  > 3 NR 14 cm-Ankle 12.5 NR  > 38 32    Right Superficial Fibular Sensory   14 cm-Ankle NR NR NR  > 3 NR 14 cm-Ankle 12.5 NR  > 38 31.6    Left Sural Sensory   Calf-Lat Mall NR NR NR  > 5 NR Calf-Lat Mall 14 NR  > 38 31.8    Right Sural Sensory   Calf-Lat Mall NR NR NR  > 5 NR Calf-Lat Mall 14 NR  > 38 31    Right Ulnar Sensory   Wrist-Dig V 2.6 3.3 13  > 8 20 Wrist-Dig V 12.5 48  > 48 33        Electromyography     Side Muscle Ins Act Fibs/PSW Fasc HF Amp Dur Poly Recrt Int Pat   Right Tib Anterior Nml None Nml 0 2+ 1+ 0 Josué Nml   Right Gastroc MH Nml 3+ Nml CRD 1+ 1+ 0 Josué Nml   Right Vastus Lat Nml None Nml 0 1+ 1+ 0 Nml Nml   Right Biceps Fem SH Nml None Nml 0 2+ 2+ 0 ModRed Nml   Right Gluteus Med Nml None Nml 0 Nml Nml 0 Nml Nml         NCS Waveforms:    Motor                           Sensory                           Ultrasound Images:        Sincerely,    Julio Corley MD

## 2022-06-23 NOTE — PROGRESS NOTES
[facility]  Electrodiagnostic Laboratory                 Department of Neurology                                                                                                         Test Date:  2022    Patient: Markell Burton : 1965 Physician: Julio Corley MD   Sex: Male AGE: 56 year Ref Phys: Bala Villasenor MD   ID#: [id number]   Technician: Kristy Behling     Clinical Information:    56 year old man with history of numbness and tingling at his feet and lower legs, gradually progressing over 10 years. He also has a history of prior lumbar spine surgery (L5 laminectomy). Query polyneuropathy.     Techniques:    Motor and sensory conduction studies were done with surface recording electrodes. EMG was done with a concentric needle electrode.     Results:    Bilateral fibular (TA) motor NCSs were normal. Bilateral tibial (AH) and left fibular (EDB) motor NCSs showed no responses. Right fibular (EDB) motor NCS showed normal distal latency, extremely attenuated CMAP amplitudes, and normal conduction velocities. Right median (APB) and ulnar (ADM) motor NCSs were normal. Right median, ulnar, and radial antidromic sensory NCSs were normal. Bilateral superficial fibular and sural SNAPs were absent. Needle EMG showed 3+ fibs/PSWs and a CRD at the right medial gastrocnemius; all other muscles showed no abnormal spontaneous activity. Mildly to moderately reduced recruitment of large, long duration MUPs was noted at the right TA, medial gastrocnemius, and short head of biceps femoris. The right vastus lateralis showed large, long duration MUPs with normal recruitment patterns. The right gluteus medius was normal.     Interpretation:    Abnormal study. There is electrodiagnostic evidence of 1) A length dependent, axonal sensorimotor polyneuropathy of moderate severity, and 2) Chronic right lumbar radiculopathies at L4 and S1 (and possibly L5).      ___________________________  Julio Corley MD        Nerve Conduction Studies  Motor Sites      Latency Amplitude Neg. Amp Diff Segment Distance Velocity Neg. Dur Neg Area Diff Temperature Comment   Site (ms) Norm (mV) Norm %  cm m/s Norm ms %  C    Left Dp Branch Fibular (TA) Motor   Fib Head 5.1  < 6.0 4.2 -      10.4  32.5    Pop Fossa 7.1  < 5.7 3.3 - -21.4 Pop Fossa-Fib Head 10 50 - 10.4 -21.3 32.6    Right Dp Branch Fibular (TA) Motor   Fib Head 4.6  < 6.0 4.1 -      10.2  31.9    Pop Fossa 6.7  < 5.7 3.4 - -17.1 Pop Fossa-Fib Head 10 48 - 10.5 -13.2 31.9    Left Fibular (EDB) Motor   Ankle NR  < 6.0 NR  > 2.5  Ankle-EDB 8   NR  32.2    Right Fibular (EDB) Motor   Ankle 3.4  < 6.0 0.31  > 2.5  Ankle-EDB 8   6.0  31.7    Bel Fib Head 14.7 - 0.30 - -3.2 Bel Fib Head-Ankle 48 42  > 38 9.6 11.8 31.7    Pop Fossa 17.7 - 0.27 - -10.0 Pop Fossa-Bel Fib Head 12 40  > 38 8.9 -5.8 31.7    Right Median (APB) Motor   Wrist 4.3  < 4.4 8.3  > 5.0  Wrist-APB 8   5.9  32.9    Elbow 9.4 - 7.4 - -10.8 Elbow-Wrist 27 53  > 48 6.2 -8.8 32.9    Left Tibial (AHB) Motor   Ankle NR  < 6.5 NR  > 4.4  Ankle-AHB 8   NR  31.9    Right Tibial (AHB) Motor   Ankle NR  < 6.5 NR  > 4.4  Ankle-AHB 8   NR  31.9    Right Ulnar (ADM) Motor   Wrist 3.3  < 3.5 9.9  > 5.0  Wrist-ADM 8   6.4  32.9    Bel Elbow 7.8 - 9.7 - -2.0 Bel Elbow-Wrist 25 56  > 48 6.8 -6.5 32.9    Abv Elbow 10.2 - 9.6 - -1.03 Abv Elbow-Bel Elbow 12 50  > 48 6.9 0.27 33      Sensory Sites      Onset Lat Peak Lat Amp (O-P) Amp (P-P) Segment Distance Velocity Temperature Comment   Site ms ms  V Norm  V  cm m/s Norm  C    Right Median Sensory   Wrist-Dig II 2.8 3.6 15  > 10 23 Wrist-Dig II 14 50  > 48 33    Right Radial Sensory   Forearm-Wrist 2.2 2.9 15  > 15 18 Forearm-Wrist 12.5 57 - 32.5    Left Superficial Fibular Sensory   14 cm-Ankle NR NR NR  > 3 NR 14 cm-Ankle 12.5 NR  > 38 32    Right Superficial Fibular Sensory   14 cm-Ankle NR NR NR  > 3 NR 14 cm-Ankle  12.5 NR  > 38 31.6    Left Sural Sensory   Calf-Lat Mall NR NR NR  > 5 NR Calf-Lat Mall 14 NR  > 38 31.8    Right Sural Sensory   Calf-Lat Mall NR NR NR  > 5 NR Calf-Lat Mall 14 NR  > 38 31    Right Ulnar Sensory   Wrist-Dig V 2.6 3.3 13  > 8 20 Wrist-Dig V 12.5 48  > 48 33        Electromyography     Side Muscle Ins Act Fibs/PSW Fasc HF Amp Dur Poly Recrt Int Pat   Right Tib Anterior Nml None Nml 0 2+ 1+ 0 Josué Nml   Right Gastroc MH Nml 3+ Nml CRD 1+ 1+ 0 Josué Nml   Right Vastus Lat Nml None Nml 0 1+ 1+ 0 Nml Nml   Right Biceps Fem SH Nml None Nml 0 2+ 2+ 0 ModRed Nml   Right Gluteus Med Nml None Nml 0 Nml Nml 0 Nml Nml         NCS Waveforms:    Motor                           Sensory                           Ultrasound Images:

## 2022-06-24 DIAGNOSIS — G62.9 POLYNEUROPATHY: Primary | ICD-10-CM

## 2022-09-06 ENCOUNTER — OFFICE VISIT (OUTPATIENT)
Dept: NEUROLOGY | Facility: CLINIC | Age: 57
End: 2022-09-06
Attending: FAMILY MEDICINE

## 2022-09-06 VITALS
SYSTOLIC BLOOD PRESSURE: 147 MMHG | HEIGHT: 77 IN | BODY MASS INDEX: 28.41 KG/M2 | HEART RATE: 57 BPM | WEIGHT: 240.6 LBS | DIASTOLIC BLOOD PRESSURE: 75 MMHG | TEMPERATURE: 97.5 F

## 2022-09-06 DIAGNOSIS — G60.9 HEREDITARY AND IDIOPATHIC PERIPHERAL NEUROPATHY: Primary | ICD-10-CM

## 2022-09-06 ASSESSMENT — PAIN SCALES - GENERAL: PAINLEVEL: NO PAIN (0)

## 2022-09-06 NOTE — LETTER
9/6/2022     RE: Markell Toussaint  [address] [facility] [address]     Dear Colleague,    Thank you for referring your patient, Markell Toussaint, to the [facility] at [facility]. Please see a copy of my visit note below.    Chief Complaint: numbness in feet    History of Present Illness:    Markell Toussaint is a 56  year old man who we are seeing at the request of Dr. Villasenor for evaluation of peripheral neuropathy.  He was previously cared for by Dr. Hua ([facility]) prior to moving to [place].    Onset was hard to know for sure but was probably around 2016 when he was in his late 40's. The initial symptom was numbness at the soles of both feet.  Numbness gradually increased in affected area with time, with current numbness experienced up to the level of lower shins.  He has tingling of feet associated with walking long distances but denies outright pain associated with neuropathy.  He has never taken any medications specifically for neuropathy.  He has occasional cramps in his calves at nighttime, once every 1.5-2 months associated with dehydration.  Prior workup was notable for slightly low folate level, he started folic acid supplementation March 2021 with no clear benefit.    At today's appointment, he is worried about maintaining his ability to walk.  He expressed worry about developing pain in the future associated with current neuropathy.    He denies hypesthesias, parashesias, dysesthesias, or allodynia.  He has not experienced changes in sweating, anhidrosis, lightheadedness, syncope, or early satiety and has not noticed changes in bowel or bladder function. He denies frequent cramps, fasciculations, or difficulty with muscle stiffness and muscle relaxation. Speech and swallowing are normal. Appetite is good and weight is relatively stable. He denies breathing difficulties or shortness of breath while lying flat. Mood  and affect are appropriate. He is independent, has no assistive devices, able to ambulate independently, and is not falling. He denies sleep complaints or restless leg symptoms.    Prior pertinent laboratory work-up:  3/3/2021: folate 4.9 (low, reference >= 5.4), A1C 5.8, vitamin D 28, normal immunofixation, SPEP, ESR, LFTs, Borrelia, YOVANA, CK, RF, CCP, lipid panel, TSH, HIV, vitamin B12, syphilis    Prior pertinent radiology work-up:  3/3/2021: lumbosacral spine x-ray with degenerative disc disease    Prior electrophysiologic work-up:  6/23/2022: NCS/EMG at [facility] showed a length dependent, axonal sensorimotor polyneuropathy of moderate severity, and chronic right lumbar radiculopathies at L4 and S1 (and possibly L5).    Past Medical History:   Bilateral rotator cuff impingement  Hyperlipidemia  GERD  Right great toe hammertoe  Prediabetes    Past Surgical History:  Lumbar spine surgery with laminectomy in 1990s due to herniated disc    Family history:    Paternal uncle with peripheral neuropathy around age 60 or later.  There is otherwise no known family history of hereditary neuropathies or other neuromuscular disorders.    Social History:    Works for [employer].  Lives with wife, he has adult daughters.  He consumes alcohol 1/week typically.  He previously demonstrated heavier alcohol use during his college years and around 2010 associated with clinical depression when his prior wife passed away.  No tobacco or illicit drug use. There is no known exposure to toxins or heavy metals.    Medical Allergies:  NKDA     Current Medications:   Current Outpatient Medications:      Ascorbic Acid (VITAMIN C PO), , Disp: , Rfl:      Cholecalciferol (VITAMIN D3 PO), Take by mouth daily, Disp: , Rfl:      Coenzyme Q10 (COQ10 PO), , Disp: , Rfl:      FOLIC ACID PO, Take 1 mg by mouth daily, Disp: , Rfl:      Glucosamine HCl (GLUCOSAMINE PO), , Disp: , Rfl:      Multiple Vitamins-Minerals (ZINC PO), Zinc, Disp: ,  "Rfl:      Omega-3 Fatty Acids (FISH OIL PO), , Disp: , Rfl:      rosuvastatin (CRESTOR) 10 MG tablet, Take 1 tablet (10 mg) by mouth daily, Disp: 90 tablet, Rfl: 3     ketoconazole (NIZORAL) 2 % external shampoo, Apply topically daily as needed (rash on back) (Patient not taking: Reported on 9/6/2022), Disp: 120 mL, Rfl: 3     sildenafil (REVATIO) 20 MG tablet, Take 3 tablets (60 mg) by mouth once as needed (30-60 minutes before sexual intercourse), Disp: 72 tablet, Rfl: 3    Review of Systems: A complete review of systems was obtained and was negative except for what was noted above.    Physical examination:    Ht 1.956 m (6' 5\")   Wt 109.1 kg (240 lb 9.6 oz)   BMI 28.53 kg/m       General Appearance: NAD    Skin: There are no rashes or other skin lesions.    Musculoskeletal:  There is no scoliosis, lordosis, kyphosis, pes cavus, or hammertoes.    Neurologic examination:    Mental status:  Patient is alert, attentive, and oriented x 3.  Language is coherent and fluent without aphasia.  Memory, comprehension and ability to follow commands were intact.       Cranial nerves:  Pupils were round and reacted to light.  Extraocular movements were full. There was no face, jaw, palate or tongue weakness or atrophy. Hearing was grossly intact.  Shoulder shrug was normal.       Motor exam: No atrophy or fasciculations.  Manual muscle testing revealed MRC grade 5/5 strength throughout including proximal and distal muscles of the arms and legs.    Complex motor skills: No tremor or ataxia    Sensory exam revealed decreased pinprick right distal lower extremity starting at mid-shin laterally and slightly above ankle medially.  Decreased pinprick distal left lower extremity starting at level of ankle. Vibratory perception was absent right toe, 4 seconds left toe, 8-9 seconds both ankles, intact at knees and fingers. Proprioception was intact. Light touch was normal.  Romberg sign with mild sway.    Gait: Narrow and stable.  He " was able to walk on his heels, toes and tandem without any difficulty.       Deep tendon reflexes:   Right Left   Triceps 2 2   Biceps 2 2   Brachioradialis 2 2   Knee jerk 1 1   Ankle jerk absent absent   Plantar responses were mute bilaterally.       Assessment:    Markell Toussaint is a 56 year old man with symptoms, signs and electrophysiologic evidence of a length-dependent, axonal sensorimotor polyneuropathy.  Etiology is idiopathic. Prior work up for causes of neuropathy has been adequate. Some idiopathic polyneuropathies have a genetically determined basis. Considering his relatively early age of onset he may fall into this category. Although we considered genetic testing, given probably low yield and absence of any disease modifying treatments for hereditary neuropathy will not pursue genetic testing at this time. We discussed prognosis of idiopathic neuropathy, which is usually favorable. We also discussed management, which is supportive. We will see him back yearly for surveillance. He knows to call us sooner if his symptoms change before then.    Plan:      1. Labs: None needed today. Discussed genetic testing as above. Defer for now.   2. Neuropathy prognosis discussed as as above  3. Also discussed superimposed chronic LS radiculopathies. These are stable and do not require further work up or treatment at this time  4. Encouraged supportive, comfortable footwear and frequent foot inspection  5. Encourage optimization of nutrition and and ability appropriate activity  6. Symptomatic management of pain and paresthesias: None needed  7. Follow up in 12 months    Carmita Quintero MD  CNP Fellow    Seen and discussed with Dr. Gilman. His addendum follows.   ----  ADDENDUM:  I personally interviewed and examined the patient. I agree with the history, physical, assessment, and plan as documented above. Changes to the physical examination, assessment and plan have been incorporated into the note by myself, as to make  it a single cohesive document.    Enrique Gilman MD

## 2022-09-06 NOTE — PROGRESS NOTES
Chief Complaint: numbness in feet    History of Present Illness:    Markell Toussaint is a 56  year old man who we are seeing at the request of Dr. Villasenor for evaluation of peripheral neuropathy.  He was previously cared for by Dr. Hua ([facility] in [place]) prior to moving to [place].    Onset was hard to know for sure but was probably around 2016 when he was in his late 40's. The initial symptom was numbness at the soles of both feet.  Numbness gradually increased in affected area with time, with current numbness experienced up to the level of lower shins.  He has tingling of feet associated with walking long distances but denies outright pain associated with neuropathy.  He has never taken any medications specifically for neuropathy.  He has occasional cramps in his calves at nighttime, once every 1.5-2 months associated with dehydration.  Prior workup was notable for slightly low folate level, he started folic acid supplementation March 2021 with no clear benefit.    At today's appointment, he is worried about maintaining his ability to walk.  He expressed worry about developing pain in the future associated with current neuropathy.    He denies hypesthesias, parashesias, dysesthesias, or allodynia.  He has not experienced changes in sweating, anhidrosis, lightheadedness, syncope, or early satiety and has not noticed changes in bowel or bladder function. He denies frequent cramps, fasciculations, or difficulty with muscle stiffness and muscle relaxation. Speech and swallowing are normal. Appetite is good and weight is relatively stable. He denies breathing difficulties or shortness of breath while lying flat. Mood and affect are appropriate. He is independent, has no assistive devices, able to ambulate independently, and is not falling. He denies sleep complaints or restless leg symptoms.    Prior pertinent laboratory work-up:  3/3/2021: folate 4.9 (low, reference >= 5.4), A1C 5.8, vitamin D 28,  normal immunofixation, SPEP, ESR, LFTs, Borrelia, YOVANA, CK, RF, CCP, lipid panel, TSH, HIV, vitamin B12, syphilis    Prior pertinent radiology work-up:  3/3/2021: lumbosacral spine x-ray with degenerative disc disease    Prior electrophysiologic work-up:  6/23/2022: NCS/EMG at [facility] showed a length dependent, axonal sensorimotor polyneuropathy of moderate severity, and chronic right lumbar radiculopathies at L4 and S1 (and possibly L5).    Past Medical History:   Bilateral rotator cuff impingement  Hyperlipidemia  GERD  Right great toe hammertoe  Prediabetes    Past Surgical History:  Lumbar spine surgery with laminectomy in 1990s due to herniated disc    Family history:    Paternal uncle with peripheral neuropathy around age 60 or later.  There is otherwise no known family history of hereditary neuropathies or other neuromuscular disorders.    Social History:    Works for [employer].  Lives with wife, he has adult daughters.  He consumes alcohol 1/week typically.  He previously demonstrated heavier alcohol use during his college years and around 2010 associated with clinical depression when his prior wife passed away.  No tobacco or illicit drug use. There is no known exposure to toxins or heavy metals.    Medical Allergies:  NKDA     Current Medications:   Current Outpatient Medications:      Ascorbic Acid (VITAMIN C PO), , Disp: , Rfl:      Cholecalciferol (VITAMIN D3 PO), Take by mouth daily, Disp: , Rfl:      Coenzyme Q10 (COQ10 PO), , Disp: , Rfl:      FOLIC ACID PO, Take 1 mg by mouth daily, Disp: , Rfl:      Glucosamine HCl (GLUCOSAMINE PO), , Disp: , Rfl:      Multiple Vitamins-Minerals (ZINC PO), Zinc, Disp: , Rfl:      Omega-3 Fatty Acids (FISH OIL PO), , Disp: , Rfl:      rosuvastatin (CRESTOR) 10 MG tablet, Take 1 tablet (10 mg) by mouth daily, Disp: 90 tablet, Rfl: 3     ketoconazole (NIZORAL) 2 % external shampoo, Apply topically daily as needed (rash on back) (Patient not taking: Reported  "on 9/6/2022), Disp: 120 mL, Rfl: 3     sildenafil (REVATIO) 20 MG tablet, Take 3 tablets (60 mg) by mouth once as needed (30-60 minutes before sexual intercourse), Disp: 72 tablet, Rfl: 3    Review of Systems: A complete review of systems was obtained and was negative except for what was noted above.    Physical examination:    Ht 1.956 m (6' 5\")   Wt 109.1 kg (240 lb 9.6 oz)   BMI 28.53 kg/m       General Appearance: NAD    Skin: There are no rashes or other skin lesions.    Musculoskeletal:  There is no scoliosis, lordosis, kyphosis, pes cavus, or hammertoes.    Neurologic examination:    Mental status:  Patient is alert, attentive, and oriented x 3.  Language is coherent and fluent without aphasia.  Memory, comprehension and ability to follow commands were intact.       Cranial nerves:  Pupils were round and reacted to light.  Extraocular movements were full. There was no face, jaw, palate or tongue weakness or atrophy. Hearing was grossly intact.  Shoulder shrug was normal.       Motor exam: No atrophy or fasciculations.  Manual muscle testing revealed MRC grade 5/5 strength throughout including proximal and distal muscles of the arms and legs.    Complex motor skills: No tremor or ataxia    Sensory exam revealed decreased pinprick right distal lower extremity starting at mid-shin laterally and slightly above ankle medially.  Decreased pinprick distal left lower extremity starting at level of ankle. Vibratory perception was absent right toe, 4 seconds left toe, 8-9 seconds both ankles, intact at knees and fingers. Proprioception was intact. Light touch was normal.  Romberg sign with mild sway.    Gait: Narrow and stable.  He was able to walk on his heels, toes and tandem without any difficulty.       Deep tendon reflexes:   Right Left   Triceps 2 2   Biceps 2 2   Brachioradialis 2 2   Knee jerk 1 1   Ankle jerk absent absent   Plantar responses were mute bilaterally.       Assessment:    Markell Toussaint is a 56 " year old man with symptoms, signs and electrophysiologic evidence of a length-dependent, axonal sensorimotor polyneuropathy.  Etiology is idiopathic. Prior work up for causes of neuropathy has been adequate. Some idiopathic polyneuropathies have a genetically determined basis. Considering his relatively early age of onset he may fall into this category. Although we considered genetic testing, given probably low yield and absence of any disease modifying treatments for hereditary neuropathy will not pursue genetic testing at this time. We discussed prognosis of idiopathic neuropathy, which is usually favorable. We also discussed management, which is supportive. We will see him back yearly for surveillance. He knows to call us sooner if his symptoms change before then.    Plan:      1. Labs: None needed today. Discussed genetic testing as above. Defer for now.   2. Neuropathy prognosis discussed as as above  3. Also discussed superimposed chronic LS radiculopathies. These are stable and do not require further work up or treatment at this time  4. Encouraged supportive, comfortable footwear and frequent foot inspection  5. Encourage optimization of nutrition and and ability appropriate activity  6. Symptomatic management of pain and paresthesias: None needed  7. Follow up in 12 months    Carmita Quintero MD  CNP Fellow    Seen and discussed with Dr. Gilman. His addendum follows.   ----  ADDENDUM:  I personally interviewed and examined the patient. I agree with the history, physical, assessment, and plan as documented above. Changes to the physical examination, assessment and plan have been incorporated into the note by myself, as to make it a single cohesive document.    Enrique Gilman MD

## 2022-09-14 DIAGNOSIS — E78.00 HYPERCHOLESTEREMIA: ICD-10-CM

## 2022-09-15 RX ORDER — ROSUVASTATIN CALCIUM 10 MG/1
TABLET, COATED ORAL
Qty: 90 TABLET | Refills: 0 | Status: SHIPPED | OUTPATIENT
Start: 2022-09-15 | End: 2022-12-15

## 2022-09-15 NOTE — TELEPHONE ENCOUNTER
Rosuvastatin (Crestor) 10 mg    Last Office Visit: 5/18/22  [facility] [place] Appointments: None  Medication last refilled: 9/17/21 #90 with 3 refill(s)    Required labs per protocol:    LAB REF RANGE 5/17/21 9/17/21   LDL < 100 mg/dL 105 High 108 High     Routing refill request to provider for review/approval because:  Drug not on the G refill protocol     BARBI Mendoza, RN, CCM

## 2022-10-03 ENCOUNTER — HEALTH MAINTENANCE LETTER (OUTPATIENT)
Age: 57
End: 2022-10-03

## 2022-12-13 DIAGNOSIS — E78.00 HYPERCHOLESTEREMIA: ICD-10-CM

## 2022-12-15 RX ORDER — ROSUVASTATIN CALCIUM 10 MG/1
TABLET, COATED ORAL
Qty: 90 TABLET | Refills: 0 | Status: SHIPPED | OUTPATIENT
Start: 2022-12-15 | End: 2023-01-27

## 2022-12-15 NOTE — TELEPHONE ENCOUNTER
Medication requested: rosuvastatin (CRESTOR) 10 MG tablet  Last office visit: 4/28/2022  [facility] appointments: none  Medication last refilled: 9/15/2022; 90 + 0 refills  Last qualifying labs:     Component      Latest Ref Rng & Units 9/17/2021          12:14 PM   LDL Cholesterol Calculated      <=100 mg/dL 108 (H)     Prescription approved per [facility] Refill Protocol.    BARBI Brown, RN  12/15/22, 1:13 PM

## 2023-01-22 ASSESSMENT — ENCOUNTER SYMPTOMS
HEARTBURN: 1
ARTHRALGIAS: 0
EYE PAIN: 0
SORE THROAT: 0
PALPITATIONS: 0
DIZZINESS: 0
NAUSEA: 0
CHILLS: 0
MYALGIAS: 0
DYSURIA: 0
ABDOMINAL PAIN: 0
CONSTIPATION: 0
COUGH: 0
HEADACHES: 0
DIARRHEA: 0
HEMATOCHEZIA: 0
HEMATURIA: 0
SHORTNESS OF BREATH: 0
JOINT SWELLING: 0
NERVOUS/ANXIOUS: 0
PARESTHESIAS: 0
FEVER: 0
FREQUENCY: 0
WEAKNESS: 0

## 2023-01-25 ASSESSMENT — ENCOUNTER SYMPTOMS
JOINT SWELLING: 0
CHILLS: 0
COUGH: 0
SORE THROAT: 0
PARESTHESIAS: 0
HEMATOCHEZIA: 0
HEARTBURN: 1
EYE PAIN: 0
PALPITATIONS: 0
CONSTIPATION: 0
ARTHRALGIAS: 0
FEVER: 0
DIARRHEA: 0
HEADACHES: 0
FREQUENCY: 0
MYALGIAS: 0
HEMATURIA: 0
DIZZINESS: 0
WEAKNESS: 0
SHORTNESS OF BREATH: 0
ABDOMINAL PAIN: 0
NAUSEA: 0
NERVOUS/ANXIOUS: 0
DYSURIA: 0

## 2023-01-26 NOTE — PROGRESS NOTES
SUBJECTIVE:   CC: Markell is an 57 year old who presents for preventative health visit.      Healthy Habits:     Getting at least 3 servings of Calcium per day:  NO    Bi-annual eye exam:  Yes    Dental care twice a year:  NO    Sleep apnea or symptoms of sleep apnea:  None    Diet:  Regular (no restrictions)    Frequency of exercise:  1 day/week    Duration of exercise:  15-30 minutes    Taking medications regularly:  Yes    Medication side effects:  None    PHQ-2 Total Score: 0    Additional concerns today:  Yes    - bikes in the summer  - has a stationary bike he could use in winter      Today's PHQ-2 Score:   PHQ-2 ( 1999 Pfizer) 1/22/2023   Q1: Little interest or pleasure in doing things 0   Q2: Feeling down, depressed or hopeless 0   PHQ-2 Score 0   PHQ-2 Total Score (12-17 Years)- Positive if 3 or more points; Administer PHQ-A if positive -   Q1: Little interest or pleasure in doing things Not at all   Q2: Feeling down, depressed or hopeless Not at all   PHQ-2 Score 0       Have you ever done Advance Care Planning? (For example, a Health Directive, POLST, or a discussion with a medical provider or your loved ones about your wishes): No, advance care planning information given to patient to review.  Advanced care planning was discussed at today's visit.    # Back Pain  Location: Pain between right shoulder blade and the spine, and comes and goes. It is a constant dull annoying pain with moments of increased intensity. Feels like a muscle knot. It helps when he uses a roller or rubs against the wall. The pain decreases when he is moving. He is able to sleep and the pain doesn't wake him up at night. Some days it feels better, then the pain returns intermittently.   Duration: 2 months  Radiation:  No radiation  Numbness/ Tingling? Tingling to armpit and down the outside of arm to elbow  Weakness? No weakness   Trauma? No known trauma   Flexion or Extension bias: No bias  Red flags? No history of cancer, unexplained  weight loss, bladder or bowel incontinence, bladder retention, prolonged steroid use, fever  Meds tried? Icing hasn't helped, but heat does. Ibuprofen 600mg every other day. Was taking it everyday for a few weeks.  PT? Has a history of doing PT, has helped in the past.   Imaging? Has not had imaging done since his RCR.     - History of mini-open right rotator cuff repair years ago (doesn't remember which one) after injury playing basketball.      # GERD  - pretty good overall  - has symptoms every 30-40 days  - will take an omeprazole 20mg daily PRN for flares    # High Cholesterol  - takes rosuvastatin 10mg daily    Recent Labs   Lab Test 09/17/21  1214   CHOL 191   HDL 59   *   TRIG 120       # ED  - takes sildenafil 60mg PRN  - works well  - occasionally causes a headache    # Neuropathy  - follows with neurology, annually, last visit 09/2022  - idiopathic  - hasn't progressed much, up a little on ankles  - no balance troubles, not painful    Social History     Tobacco Use     Smoking status: Never     Smokeless tobacco: Never   Substance Use Topics     Alcohol use: Yes     Comment: Once a week, 2-3 beers at a time       Alcohol Use 1/22/2023   Prescreen: >3 drinks/day or >7 drinks/week? No     Last PSA:   PSA Tumor Marker   Date Value Ref Range Status   09/17/2021 0.64 0.00 - 4.00 ug/L Final     9/10/19 subcentimeter polyp, TA    Reviewed orders with patient. Reviewed health maintenance and updated orders accordingly - Yes      Reviewed and updated as needed this visit by clinical staff   Tobacco  Allergies  Meds   Med Hx  Surg Hx  Fam Hx          Reviewed and updated as needed this visit by Provider   Tobacco  Allergies  Meds   Med Hx  Surg Hx  Fam Hx             Review of Systems   Constitutional: Negative for chills and fever.   HENT: Negative for congestion, ear pain, hearing loss and sore throat.    Eyes: Negative for pain and visual disturbance.   Respiratory: Negative for cough and  "shortness of breath.    Cardiovascular: Negative for chest pain, palpitations and peripheral edema.   Gastrointestinal: Positive for heartburn. Negative for abdominal pain, constipation, diarrhea, hematochezia and nausea.   Genitourinary: Positive for impotence. Negative for dysuria, frequency, genital sores, hematuria, penile discharge and urgency.   Musculoskeletal: Negative for arthralgias, joint swelling and myalgias.   Skin: Negative for rash.   Neurological: Negative for dizziness, weakness, headaches and paresthesias.   Psychiatric/Behavioral: Negative for mood changes. The patient is not nervous/anxious.      OBJECTIVE:   /78 (BP Location: Right arm, Patient Position: Sitting, Cuff Size: Adult Large)   Pulse 61   Temp 97.3  F (36.3  C)   Ht 1.956 m (6' 5\")   Wt 108.4 kg (239 lb)   SpO2 97%   BMI 28.34 kg/m      Physical Exam  GENERAL: healthy, alert and no distress  EYES: Eyes grossly normal to inspection, PERRL and conjunctivae and sclerae normal  HENT: ear canals and TM's normal, nose and mouth without ulcers or lesions  NECK: no adenopathy, no asymmetry, masses, or scars and thyroid normal to palpation  RESP: lungs clear to auscultation - no rales, rhonchi or wheezes  CV: regular rate and rhythm, normal S1 S2, no S3 or S4, no murmur, click or rub, no peripheral edema and peripheral pulses strong  ABDOMEN: soft, nontender, no hepatosplenomegaly, no masses and bowel sounds normal  MS: no gross musculoskeletal defects noted, no edema  SKIN: no suspicious lesions or rashes  NEURO: Normal strength and tone, mentation intact and speech normal  PSYCH: mentation appears normal, affect normal/bright    BACK/RIGHT SHOULDER  Bony tenderness: None at spine or scapula  Paraspinal tenderness:   None.    Muscular tenderness: None over rhomboid where Markell indicates his pain typically is.   ROM: full shoulder ROM, symmetric, no pain    Diagnostic Test Results:  Labs reviewed in Epic    ASSESSMENT/PLAN: " "    (Z00.00) Annual physical exam  (primary encounter diagnosis)  Comment: Age appropriate screening and preventive services provided.   Plan:     (N52.9) Erectile dysfunction, unspecified erectile dysfunction type  Comment: Chronic, stable.   Plan: sildenafil (REVATIO) 20 MG tablet          (Z13.220) Screening cholesterol level  Comment: Plan: Lipid panel reflex to direct LDL Non-fasting          (R73.01) Elevated fasting glucose  Comment: Plan: Hemoglobin A1c          (S29.012A) Strain of rhomboid muscle, initial encounter  Comment: Acute, uncomplicated.   Already has a good ergonomic work station set up. Not clear to met what is triggering this pain but the location fits with a rhomboid strain.  Referring to PT>   Plan: Physical Therapy Referral          (E78.00) Hypercholesteremia  Comment: Chronic, stable.   Plan: rosuvastatin (CRESTOR) 10 MG tablet          (Z23) Need for prophylactic vaccination and inoculation against influenza  Comment: Plan: INFLUENZA VACCINE IM > 6 MONTHS VALENT IIV4         (FLUZONE)    (K21.9) Gastroesophageal reflux disease without esophagitis  Comment: Chronic, stable.   Plan:     COUNSELING:   Reviewed preventive health counseling, as reflected in patient instructions      BMI:   Estimated body mass index is 28.34 kg/m  as calculated from the following:    Height as of this encounter: 1.956 m (6' 5\").    Weight as of this encounter: 108.4 kg (239 lb).     He reports that he has never smoked. He has never used smokeless tobacco.            Bala Villasenor MD  [facility]  "

## 2023-01-27 ENCOUNTER — OFFICE VISIT (OUTPATIENT)
Dept: FAMILY MEDICINE | Facility: CLINIC | Age: 58
End: 2023-01-27
Payer: COMMERCIAL

## 2023-01-27 VITALS
DIASTOLIC BLOOD PRESSURE: 78 MMHG | WEIGHT: 239 LBS | OXYGEN SATURATION: 97 % | HEART RATE: 61 BPM | SYSTOLIC BLOOD PRESSURE: 122 MMHG | HEIGHT: 77 IN | TEMPERATURE: 97.3 F | BODY MASS INDEX: 28.22 KG/M2

## 2023-01-27 DIAGNOSIS — S29.012A STRAIN OF RHOMBOID MUSCLE, INITIAL ENCOUNTER: ICD-10-CM

## 2023-01-27 DIAGNOSIS — Z23 NEED FOR PROPHYLACTIC VACCINATION AND INOCULATION AGAINST INFLUENZA: ICD-10-CM

## 2023-01-27 DIAGNOSIS — E78.00 HYPERCHOLESTEREMIA: ICD-10-CM

## 2023-01-27 DIAGNOSIS — R73.01 ELEVATED FASTING GLUCOSE: ICD-10-CM

## 2023-01-27 DIAGNOSIS — N52.9 ERECTILE DYSFUNCTION, UNSPECIFIED ERECTILE DYSFUNCTION TYPE: ICD-10-CM

## 2023-01-27 DIAGNOSIS — K21.9 GASTROESOPHAGEAL REFLUX DISEASE WITHOUT ESOPHAGITIS: Chronic | ICD-10-CM

## 2023-01-27 DIAGNOSIS — Z13.220 SCREENING CHOLESTEROL LEVEL: ICD-10-CM

## 2023-01-27 DIAGNOSIS — Z00.00 ANNUAL PHYSICAL EXAM: Primary | ICD-10-CM

## 2023-01-27 LAB
CHOLEST SERPL-MCNC: 193 MG/DL
HBA1C MFR BLD: 5.8 % (ref 0–5.6)
HDLC SERPL-MCNC: 53 MG/DL
LDLC SERPL CALC-MCNC: 116 MG/DL
NONHDLC SERPL-MCNC: 140 MG/DL
TRIGL SERPL-MCNC: 118 MG/DL

## 2023-01-27 PROCEDURE — 80061 LIPID PANEL: CPT | Performed by: FAMILY MEDICINE

## 2023-01-27 RX ORDER — SILDENAFIL CITRATE 20 MG/1
60 TABLET ORAL
Qty: 72 TABLET | Refills: 3 | Status: CANCELLED | OUTPATIENT
Start: 2023-01-27

## 2023-01-27 RX ORDER — SILDENAFIL CITRATE 20 MG/1
40-60 TABLET ORAL
Qty: 90 TABLET | Refills: 3 | Status: SHIPPED | OUTPATIENT
Start: 2023-01-27 | End: 2023-10-23 | Stop reason: DRUGHIGH

## 2023-01-27 RX ORDER — ROSUVASTATIN CALCIUM 10 MG/1
10 TABLET, COATED ORAL DAILY
Qty: 90 TABLET | Refills: 3 | Status: SHIPPED | OUTPATIENT
Start: 2023-01-27 | End: 2023-03-27

## 2023-01-27 NOTE — NURSING NOTE
"Injectable Influenza Immunization Documentation    1.  Has the patient received the information for the injectable influenza vaccine? YES     2. Is the patient 6 months of age or older? YES     3. Does the patient have any of the following contraindications?         Severe allergy to eggs? No     Severe allergic reaction to previous influenza vaccines? No   Severe allergy to latex? No       History of Guillain-Lansford syndrome? No     Currently have a temperature greater than 100.4F? No        4.  Severely egg allergic patients should have flu vaccine eligibility assessed by an MD, RN, or pharmacist, and those who received flu vaccine should be observed for 15 min by an MD, RN, Pharmacist, Medical Technician, or member of clinic staff.\": YES    5. Latex-allergic patients should be given latex-free influenza vaccine Yes. Please reference the Vaccine latex table to determine if your clinic s product is latex-containing.       Vaccination given by Amelia Nick on 1/27/2023 at 5:06 PM        "

## 2023-01-27 NOTE — PATIENT INSTRUCTIONS
1) Get moving more, biking is a great idea    2) If you'd ever like to meet with a dietician to walk through your diet and just to get some external accountability, we have a great one here named Nicol Hui  You can schedule with her just like you do with me    3) Get a COVID booster at your pharmacy    4) PT will call you or you can call [phone]

## 2023-01-27 NOTE — NURSING NOTE
"57 year old  Chief Complaint   Patient presents with     Physical     Back pain       Blood pressure 122/78, pulse 61, temperature 97.3  F (36.3  C), height 1.956 m (6' 5\"), weight 108.4 kg (239 lb), SpO2 97 %. Body mass index is 28.34 kg/m .  Patient Active Problem List   Diagnosis     Neuropathy     Hypercholesteremia     Hammertoe of right foot     ED (erectile dysfunction)     Gastroesophageal reflux disease without esophagitis       Wt Readings from Last 2 Encounters:   01/27/23 108.4 kg (239 lb)   09/06/22 109.1 kg (240 lb 9.6 oz)     BP Readings from Last 3 Encounters:   01/27/23 122/78   09/06/22 (!) 147/75   05/25/22 116/75         Current Outpatient Medications   Medication     Ascorbic Acid (VITAMIN C PO)     Cholecalciferol (VITAMIN D3 PO)     Coenzyme Q10 (COQ10 PO)     FOLIC ACID PO     Glucosamine HCl (GLUCOSAMINE PO)     Multiple Vitamins-Minerals (ZINC PO)     Omega-3 Fatty Acids (FISH OIL PO)     rosuvastatin (CRESTOR) 10 MG tablet     sildenafil (REVATIO) 20 MG tablet     No current facility-administered medications for this visit.       Social History     Tobacco Use     Smoking status: Never     Smokeless tobacco: Never   Vaping Use     Vaping Use: Never used   Substance Use Topics     Alcohol use: Yes     Comment: Once a week, 2-3 beers at a time     Drug use: Never       Health Maintenance Due   Topic Date Due     ADVANCE CARE PLANNING  Never done     HEPATITIS B IMMUNIZATION (1 of 3 - 3-dose series) Never done     COLORECTAL CANCER SCREENING  Never done     COVID-19 Vaccine (4 - Booster for Moderna series) 01/28/2022     INFLUENZA VACCINE (1) 09/01/2022     YEARLY PREVENTIVE VISIT  09/17/2022       No results found for: PAP      January 27, 2023 3:50 PM  "

## 2023-02-09 ENCOUNTER — THERAPY VISIT (OUTPATIENT)
Dept: PHYSICAL THERAPY | Facility: CLINIC | Age: 58
End: 2023-02-09
Attending: FAMILY MEDICINE
Payer: COMMERCIAL

## 2023-02-09 DIAGNOSIS — S29.012D STRAIN OF RHOMBOID MUSCLE, SUBSEQUENT ENCOUNTER: Primary | ICD-10-CM

## 2023-02-09 DIAGNOSIS — S29.012A STRAIN OF RHOMBOID MUSCLE, INITIAL ENCOUNTER: ICD-10-CM

## 2023-02-09 PROCEDURE — 97161 PT EVAL LOW COMPLEX 20 MIN: CPT | Mod: GP | Performed by: PHYSICAL THERAPIST

## 2023-02-09 PROCEDURE — 97110 THERAPEUTIC EXERCISES: CPT | Mod: GP | Performed by: PHYSICAL THERAPIST

## 2023-02-09 PROCEDURE — 97112 NEUROMUSCULAR REEDUCATION: CPT | Mod: GP | Performed by: PHYSICAL THERAPIST

## 2023-02-09 NOTE — PROGRESS NOTES
[facility] Initial Evaluation -- Cervical    Evaluation Date: February 9, 2023  Markell Toussaint is a 57 year old male with a Interscap condition.   Referral: FP  Work mechanical stresses: management - sitting/computer, walking   Employment status: normal hours  Leisure mechanical stresses: Stationary bike x 30 min (has not been doing this recently)  Functional disability score (NDI):  See Flowsheet  VAS score (0-10): 3/10, ranges 0-7/10  Patient goals/expectations:  Eliminate nagging ache in the mid/UB back    HISTORY:    Present symptoms:  Pain R interscap initially, L interscap currently.  Pain quality (sharp/shooting/stabbing/aching/burning/cramping):   aching.  Paresthesia (yes/no):  Tingling R armpit and top/lat shld. Night>daytime    Present since (onset date): Oct 2022, MD referral 1-     Symptoms (improving/unchanging/worsening):  Improving slightly    Symptoms commenced as a result of: unknown    Condition occurred in the following environment:  Unknown    Symptoms at onset (neck/arm/forearm/headache): same  Constant symptoms (neck/arm/forearm/headache):   Intermittent symptoms (neck/arm/forearm/headache): interscap    Symptoms are made worse with the following: Sometimes Sitting and time of day - No effect   Symptoms are made better with the following: Always On the move    Disturbed sleep (yes/no): no  Number of pillows:   Sleeping postures (prone/sup/side R/L):     Previous episodes (0/1-5/6-10/11+):  Year of first episode:     Previous history: Possibly previous episodes of interscap pain  Previous treatments: foam roller, tennis ball, massage    Specific Questions: (as reported by the patient)  Dizziness/Tinnitus/Nausea/Swallowing (pos/neg): neg  Gait/Upper Limbs (normal/abnormal): normal  Medications (nil/NSAIDS/anlag/steroids/anticoag/other):  OTC analgesic and Other - Cholesterol and ED  Medical allergies:  NKA  General health (excellent/good/fair/poor):  good  Pertinent  medical history:  Cholesterol  Imaging (None/Xray/MRI/Other):  none  Recent or major surgery (yes/no): Bilat RCR, Lumbar discectomy  Night pain (yes/no): no  Accidents (yes/no): no  Unexplained weight loss (yes/no): no  Barriers at home: none  Other red flags: none    EXAMINATION    Posture:   Sitting (good/fair/poor): fair  Standing (good/fair/poor): good     Protruded head (yes/no): mild    Wry Neck (right/left/nil):  nil  Relevant (yes/no):       Correction of posture(better/worse/no effect): No Effect initially, better as he continued sitting with L-roll.  Other observations:      Neurological:    Motor Deficit:  NA   Reflexes:  NA  Sensory Deficit:  NA   Dural signs:  NA    Movement Loss:   Todd Mod Min Nil Pain   Protrusion    X    Flexion    X Pulling interscap   Retraction      X  Slight decr interscap pain   Extension  X   Slight decr interscap pain   Lateral flexion R    X    Lateral flexion L    X    Rotation R       X  Slight decr interscap   Rotation L       X  Slight decr interscap     Test Movements:   During: produces, abolishes, increases, decreases, no effect, centralizing, peripheralizing  After: better, worse, no better, no worse, no effect, centralized, peripheralized    Pretest symptoms sitting: Mild pain L interscap area   Symptoms During Symptoms After ROM increased ROM decreased No Effect   PRO        Rep PRO        RET with OP No Effect    No Effect         Rep RET with OP No Effect  Stretch    Better  Less pain at rest  Incr cerv ROM     RET EXT        Rep RET EXT          Static Tests:   Protrusion:  NA  Flexion:  NA  Retraction:  NA  Extension (sitting/prone/supine):  NA    Other Tests:     Provisional Classification:  Derangement - Bilateral, symmetrical, symptoms above elbow    Principle of Management:  Education:  Posture - Neutral Spine, use of L-roll, affect of posture on spine/pain, no couch, recliner, or propping up on pillows in bed, specificity of exer,  centralisation/peripheralisation, and HEP    Equipment provided:  None- Recommended using rolled towel for L-Roll whenever sitting.  Mechanical therapy (Y/N):  Y   Extension principle:  Seated Cerv Retr 10 reps 6+ x/day   Lateral principle:    Flexion principle:     Other:      ASSESSMENT/PLAN:    Patient is a 57 year old male with cervical and thoracic complaints.    Patient has the following significant findings with corresponding treatment plan.                Diagnosis 1:  Rhomboid Strain w/ apparent cervical component  Pain -  hot/cold therapy, manual therapy, self management, education, directional preference exercise and home program  Decreased ROM/flexibility - manual therapy, therapeutic exercise and home program  Decreased strength - therapeutic exercise, therapeutic activities and home program  Decreased function - therapeutic activities and home program  Impaired posture - neuro re-education and home program    Therapy Evaluation Codes:   1) History comprised of:   Personal factors that impact the plan of care:      None.    Comorbidity factors that impact the plan of care are:      Cholesterol.     Medications impacting care: OTC analgesic and Other - Cholesterol and ED.  2) Examination of Body Systems comprised of:   Body structures and functions that impact the plan of care:      Cervical spine and Thoracic Spine.   Activity limitations that impact the plan of care are:      Sitting and Sleeping.  3) Clinical presentation characteristics are:   Stable/Uncomplicated.  4) Decision-Making    Low complexity using standardized patient assessment instrument and/or measureable assessment of functional outcome.  Cumulative Therapy Evaluation is: Low complexity.    Previous and current functional limitations:  (See Goal Flow Sheet for this information)    Short term and Long term goals: (See Goal Flow Sheet for this information)     Communication ability:  Patient appears to be able to clearly communicate and  understand verbal and written communication and follow directions correctly.  Treatment Explanation - The following has been discussed with the patient:   RX ordered/plan of care  Anticipated outcomes  Possible risks and side effects  This patient would benefit from PT intervention to resume normal activities.   Rehab potential is good.    Frequency:  1 X week, once daily  Duration:  for 4 weeks tapering to 2 X a month over 4 weeks  Discharge Plan:  Achieve all LTG.  Independent in home treatment program.  Reach maximal therapeutic benefit.    Please refer to the daily flowsheet for treatment today, total treatment time and time spent performing 1:1 timed codes.

## 2023-03-03 ENCOUNTER — THERAPY VISIT (OUTPATIENT)
Dept: PHYSICAL THERAPY | Facility: CLINIC | Age: 58
End: 2023-03-03
Payer: COMMERCIAL

## 2023-03-03 DIAGNOSIS — S29.012D STRAIN OF RHOMBOID MUSCLE, SUBSEQUENT ENCOUNTER: Primary | ICD-10-CM

## 2023-03-03 PROCEDURE — 97110 THERAPEUTIC EXERCISES: CPT | Mod: GP

## 2023-03-03 PROCEDURE — 97530 THERAPEUTIC ACTIVITIES: CPT | Mod: GP

## 2023-03-03 PROCEDURE — 97140 MANUAL THERAPY 1/> REGIONS: CPT | Mod: GP

## 2023-03-10 ENCOUNTER — THERAPY VISIT (OUTPATIENT)
Dept: PHYSICAL THERAPY | Facility: CLINIC | Age: 58
End: 2023-03-10
Payer: COMMERCIAL

## 2023-03-10 DIAGNOSIS — S29.012D STRAIN OF RHOMBOID MUSCLE, SUBSEQUENT ENCOUNTER: Primary | ICD-10-CM

## 2023-03-10 PROCEDURE — 97140 MANUAL THERAPY 1/> REGIONS: CPT | Mod: GP

## 2023-03-10 PROCEDURE — 97530 THERAPEUTIC ACTIVITIES: CPT | Mod: GP

## 2023-03-10 PROCEDURE — 97110 THERAPEUTIC EXERCISES: CPT | Mod: GP

## 2023-03-24 ENCOUNTER — THERAPY VISIT (OUTPATIENT)
Dept: PHYSICAL THERAPY | Facility: CLINIC | Age: 58
End: 2023-03-24
Payer: COMMERCIAL

## 2023-03-24 DIAGNOSIS — S29.012D STRAIN OF RHOMBOID MUSCLE, SUBSEQUENT ENCOUNTER: Primary | ICD-10-CM

## 2023-03-24 PROCEDURE — 97110 THERAPEUTIC EXERCISES: CPT | Mod: GP

## 2023-03-24 PROCEDURE — 97140 MANUAL THERAPY 1/> REGIONS: CPT | Mod: GP

## 2023-03-24 PROCEDURE — 97530 THERAPEUTIC ACTIVITIES: CPT | Mod: GP

## 2023-03-25 DIAGNOSIS — E78.00 HYPERCHOLESTEREMIA: ICD-10-CM

## 2023-03-27 RX ORDER — ROSUVASTATIN CALCIUM 10 MG/1
TABLET, COATED ORAL
Qty: 90 TABLET | Refills: 2 | Status: SHIPPED | OUTPATIENT
Start: 2023-03-27 | End: 2023-09-25

## 2023-03-27 NOTE — TELEPHONE ENCOUNTER
Pt requesting remaining refills of rosuvastatin 10 mg tab be transferred to new [facility] pharmacy.    Manuel LANDON, RN  03/27/23 10:38 AM

## 2023-04-07 ENCOUNTER — THERAPY VISIT (OUTPATIENT)
Dept: PHYSICAL THERAPY | Facility: CLINIC | Age: 58
End: 2023-04-07
Payer: COMMERCIAL

## 2023-04-07 DIAGNOSIS — S29.012D STRAIN OF RHOMBOID MUSCLE, SUBSEQUENT ENCOUNTER: Primary | ICD-10-CM

## 2023-04-07 PROCEDURE — 97110 THERAPEUTIC EXERCISES: CPT | Mod: 59

## 2023-04-07 PROCEDURE — 97140 MANUAL THERAPY 1/> REGIONS: CPT | Mod: 59

## 2023-04-07 PROCEDURE — 97530 THERAPEUTIC ACTIVITIES: CPT | Mod: GP

## 2023-05-22 ENCOUNTER — THERAPY VISIT (OUTPATIENT)
Dept: PHYSICAL THERAPY | Facility: CLINIC | Age: 58
End: 2023-05-22
Payer: COMMERCIAL

## 2023-05-22 DIAGNOSIS — S29.012D STRAIN OF RHOMBOID MUSCLE, SUBSEQUENT ENCOUNTER: Primary | ICD-10-CM

## 2023-05-22 PROCEDURE — 97110 THERAPEUTIC EXERCISES: CPT | Mod: GP

## 2023-05-22 PROCEDURE — 97140 MANUAL THERAPY 1/> REGIONS: CPT | Mod: GP

## 2023-05-22 PROCEDURE — 97530 THERAPEUTIC ACTIVITIES: CPT | Mod: GP

## 2023-09-24 ASSESSMENT — ENCOUNTER SYMPTOMS
PALPITATIONS: 0
COUGH: 0
HEMATOCHEZIA: 0
SHORTNESS OF BREATH: 0
MYALGIAS: 0
WEAKNESS: 0
NERVOUS/ANXIOUS: 0
FEVER: 0
DIARRHEA: 0
HEARTBURN: 0
PARESTHESIAS: 0
HEMATURIA: 0
HEADACHES: 0
FREQUENCY: 0
CHILLS: 0
JOINT SWELLING: 0
CONSTIPATION: 0
ABDOMINAL PAIN: 0
EYE PAIN: 0
ARTHRALGIAS: 0
DIZZINESS: 0
DYSURIA: 0
NAUSEA: 0
SORE THROAT: 0

## 2023-09-24 NOTE — PROGRESS NOTES
SUBJECTIVE:   CC: Markell is an 57 year old who presents for preventative health visit.     Healthy Habits:     Getting at least 3 servings of Calcium per day:  NO    Bi-annual eye exam:  Yes    Dental care twice a year:  NO    Sleep apnea or symptoms of sleep apnea:  None    Diet:  Regular (no restrictions)    Frequency of exercise:  2-3 days/week    Duration of exercise:  15-30 minutes    Taking medications regularly:  Yes    Medication side effects:  None    Additional concerns today:  Yes    - has gotten off track with exercise  - plans to restart  - walks on the weekends, at least 30 minutes, nothing during the week  - had been doing three days a week on stationary bike    # Rash on chest  - upper chest  - will periodically notice itching and then notice a red area on his chest  - could be just a single fingernail-sized spot, could be a few of them  - flat, not raised  - no scaling  - no lesions currently  - very sporadic, will gave a couple for a while, then nothing for 4-6 weeks  - hasn't had a spot in about 8 weeks now  - hasn't noticed any seasonality or correlation with being sweaty    - has previously used Fluocinonide 0.05% solution, will apply a single application and that appears to resolve the spot    # GERD  - pretty good overall  - will take an omeprazole 20mg daily PRN for flares, very occasionally, not weekly, maybe once a month  - symptoms tend to be worse at night     # High Cholesterol  - takes rosuvastatin 10mg daily    Recent Labs   Lab Test 01/27/23  1600 09/17/21  1214   CHOL 193 191   HDL 53 59   * 108*   TRIG 118 120     Lab Results   Component Value Date    A1C 5.8 01/27/2023       # ED  - previously was taking sildenafil 60mg PRN  - this had been working well but started working less well 3-4 months ago  - has been self-increasing dose and now takes 60mg and then a little while later will take another 60mg and this seems to work for him  - occasionally causes a headache - no worse  with higher dose    - previously was on Cialis, doesn't remember why he changed    # Idiopathy Polyneuropathy  - follows with neurology, annually, last visit 09/2022  - idiopathic  - hasn't progressed much, up a little on ankles  - no balance troubles, not painful    - has pes cavus and hallux malleus of right foot, potentially due to neuropathy  - previously met with podiatry in 05/2022 and offered surgery    Social History     Tobacco Use    Smoking status: Never    Smokeless tobacco: Never   Substance Use Topics    Alcohol use: Yes     Comment: Once a week, 2-3 beers at a time             9/24/2023     8:12 PM   Alcohol Use   Prescreen: >3 drinks/day or >7 drinks/week? No     Last PSA:   PSA Tumor Marker   Date Value Ref Range Status   09/17/2021 0.64 0.00 - 4.00 ug/L Final       Reviewed orders with patient. Reviewed health maintenance and updated orders accordingly - Yes    Reviewed and updated as needed this visit by clinical staff   Tobacco  Allergies  Meds  Problems  Med Hx  Surg Hx  Fam Hx          Reviewed and updated as needed this visit by Provider   Tobacco  Allergies  Meds  Problems  Med Hx  Surg Hx  Fam Hx           Review of Systems   Constitutional:  Negative for chills and fever.   HENT:  Negative for congestion, ear pain, hearing loss and sore throat.    Eyes:  Negative for pain and visual disturbance.   Respiratory:  Negative for cough and shortness of breath.    Cardiovascular:  Negative for chest pain, palpitations and peripheral edema.   Gastrointestinal:  Negative for abdominal pain, constipation, diarrhea, heartburn, hematochezia and nausea.   Genitourinary:  Positive for impotence. Negative for dysuria, frequency, genital sores, hematuria, penile discharge and urgency.   Musculoskeletal:  Negative for arthralgias, joint swelling and myalgias.   Skin:  Negative for rash.   Neurological:  Negative for dizziness, weakness, headaches and paresthesias.   Psychiatric/Behavioral:   "Negative for mood changes. The patient is not nervous/anxious.        OBJECTIVE:   /79   Pulse 58   Temp 97.5  F (36.4  C)   Ht 1.96 m (6' 5.17\")   Wt 109.3 kg (241 lb)   SpO2 97%   BMI 28.46 kg/m      Physical Exam  GENERAL: healthy, alert and no distress  EYES: Eyes grossly normal to inspection, PERRL and conjunctivae and sclerae normal  HENT: ear canals and TM's normal, nose and mouth without ulcers or lesions  NECK: no adenopathy, no asymmetry, masses, or scars and thyroid normal to palpation  RESP: lungs clear to auscultation - no rales, rhonchi or wheezes  CV: regular rate and rhythm, normal S1 S2, no S3 or S4, no murmur, click or rub, no peripheral edema and peripheral pulses strong  ABDOMEN: soft, nontender, no hepatosplenomegaly, no masses and bowel sounds normal  MS: hammer toe deformity 1st-3rd toes right foot, extensive calluses at distal toe ends same toes. Old subungual hematoma 1st toe.   SKIN: no suspicious lesions or rashes  NEURO: Normal strength and tone, mentation intact and speech normal  PSYCH: mentation appears normal, affect normal/bright    Diagnostic Test Results:  Labs reviewed in Epic    ASSESSMENT/PLAN:     (Z00.00) Annual physical exam  (primary encounter diagnosis)  Comment: Age appropriate screening and preventive services provided  Plan: Recommended COVID-19 booster.     (L30.9) Dermatitis  Comment: Chronic, stable  Intermittent pruritic erythematous macules on chest. No current lesions to evaluate. Differential is broad. Given that the spots resolve after a single application of a high potency steroid solution and their infrequent appearance I'm not in a particular rush to definitively diagnose the rash. Continue PRN steroid but cautioned Markell about this particular steroid's potency and to not apply to face or genitals, and to let me know if he is needing to use more often than currently.   Plan: fluocinonide (LIDEX) 0.05 % external solution          (E78.00) " "Hypercholesteremia  Comment: Chronic, stable.   Plan: rosuvastatin (CRESTOR) 10 MG tablet          (R73.09) Elevated hemoglobin A1c  Comment: Chronic, stable.   Plan: Hemoglobin A1c          (Z13.220) Screening cholesterol level  Comment: Plan: Lipid panel reflex to direct LDL Non-fasting          (Z23) Need for prophylactic vaccination and inoculation against influenza  Comment: Plan: INFLUENZA VACCINE >6 MONTHS (AFLURIA/FLUZONE)          (M20.41) Hammertoe of right foot  Comment: Chronic, stable  Plan: Return to podiatry.     (K21.9) Gastroesophageal reflux disease without esophagitis  Comment: Chronic, stable. Infrequent use of PPI PRN.   Plan:     (N52.9) Erectile dysfunction, unspecified erectile dysfunction type  Comment: Chronic, progressed  Plan: Trial of sildenafil 100mg daily PRN using his current 20mg tablets. If works well and tolerated, will update prescription for 100mg tablet. If doesn't work well or causes intolerable side effects, will try tadalafil.     (G62.9) Neuropathy  Comment: Chronic, stable. Following with neurology. Has not progressed.   Plan:     COUNSELING:   Reviewed preventive health counseling, as reflected in patient instructions      BMI:   Estimated body mass index is 28.46 kg/m  as calculated from the following:    Height as of this encounter: 1.96 m (6' 5.17\").    Weight as of this encounter: 109.3 kg (241 lb).     He reports that he has never smoked. He has never used smokeless tobacco.            MD SUHAS Soriano [facility]  "

## 2023-09-25 ENCOUNTER — OFFICE VISIT (OUTPATIENT)
Dept: FAMILY MEDICINE | Facility: CLINIC | Age: 58
End: 2023-09-25
Payer: COMMERCIAL

## 2023-09-25 VITALS
HEIGHT: 77 IN | SYSTOLIC BLOOD PRESSURE: 123 MMHG | DIASTOLIC BLOOD PRESSURE: 79 MMHG | OXYGEN SATURATION: 97 % | HEART RATE: 58 BPM | TEMPERATURE: 97.5 F | WEIGHT: 241 LBS | BODY MASS INDEX: 28.46 KG/M2

## 2023-09-25 DIAGNOSIS — Z23 NEED FOR PROPHYLACTIC VACCINATION AND INOCULATION AGAINST INFLUENZA: ICD-10-CM

## 2023-09-25 DIAGNOSIS — M20.41 HAMMERTOE OF RIGHT FOOT: Chronic | ICD-10-CM

## 2023-09-25 DIAGNOSIS — Z13.220 SCREENING CHOLESTEROL LEVEL: ICD-10-CM

## 2023-09-25 DIAGNOSIS — N52.9 ERECTILE DYSFUNCTION, UNSPECIFIED ERECTILE DYSFUNCTION TYPE: Chronic | ICD-10-CM

## 2023-09-25 DIAGNOSIS — K21.9 GASTROESOPHAGEAL REFLUX DISEASE WITHOUT ESOPHAGITIS: Chronic | ICD-10-CM

## 2023-09-25 DIAGNOSIS — R73.09 ELEVATED HEMOGLOBIN A1C: ICD-10-CM

## 2023-09-25 DIAGNOSIS — E78.00 HYPERCHOLESTEREMIA: ICD-10-CM

## 2023-09-25 DIAGNOSIS — G62.9 NEUROPATHY: Chronic | ICD-10-CM

## 2023-09-25 DIAGNOSIS — Z00.00 ANNUAL PHYSICAL EXAM: Primary | ICD-10-CM

## 2023-09-25 DIAGNOSIS — L30.9 DERMATITIS: ICD-10-CM

## 2023-09-25 PROBLEM — S29.012D: Status: RESOLVED | Noted: 2023-02-09 | Resolved: 2023-09-25

## 2023-09-25 LAB — HBA1C MFR BLD: 5.7 % (ref 0–5.6)

## 2023-09-25 PROCEDURE — 80061 LIPID PANEL: CPT | Mod: ORL | Performed by: FAMILY MEDICINE

## 2023-09-25 RX ORDER — FLUOCINONIDE TOPICAL SOLUTION USP, 0.05% 0.5 MG/ML
SOLUTION TOPICAL 2 TIMES DAILY PRN
Qty: 60 ML | Refills: 0 | Status: SHIPPED | OUTPATIENT
Start: 2023-09-25

## 2023-09-25 RX ORDER — ROSUVASTATIN CALCIUM 10 MG/1
10 TABLET, COATED ORAL DAILY
Qty: 90 TABLET | Refills: 3 | Status: SHIPPED | OUTPATIENT
Start: 2023-09-25 | End: 2024-09-06

## 2023-09-25 ASSESSMENT — ENCOUNTER SYMPTOMS
CHILLS: 0
SHORTNESS OF BREATH: 0
DIZZINESS: 0
HEMATURIA: 0
FEVER: 0
ABDOMINAL PAIN: 0
JOINT SWELLING: 0
SORE THROAT: 0
WEAKNESS: 0
COUGH: 0
DIARRHEA: 0
HEADACHES: 0
CONSTIPATION: 0
PARESTHESIAS: 0
NAUSEA: 0
MYALGIAS: 0
NERVOUS/ANXIOUS: 0
PALPITATIONS: 0
HEARTBURN: 0
DYSURIA: 0
FREQUENCY: 0
HEMATOCHEZIA: 0
ARTHRALGIAS: 0
EYE PAIN: 0

## 2023-09-25 NOTE — PATIENT INSTRUCTIONS
1) Try taking Sildenafil 100mg (five 20mg tablets all together)  Let me know if it this works or doesn't work, causes headache  If this works well, we'll plan to switch you to a 100mg tablet with your next refill     2) Call to schedule follow up with Podiatry, [phone]  I recommend Dr. Ben Garibay    3) Get a COVID booster

## 2023-09-25 NOTE — NURSING NOTE
"57 year old  Chief Complaint   Patient presents with    Physical     Wants to discuss ED medication, look at hammer toe on the right foot       Blood pressure 123/79, pulse 58, temperature 97.5  F (36.4  C), height 1.96 m (6' 5.17\"), weight 109.3 kg (241 lb), SpO2 97 %. Body mass index is 28.46 kg/m .  Patient Active Problem List   Diagnosis    Neuropathy    Hypercholesteremia    Hammertoe of right foot    ED (erectile dysfunction)    Gastroesophageal reflux disease without esophagitis    Strain of rhomboid muscle, subsequent encounter       Wt Readings from Last 2 Encounters:   09/25/23 109.3 kg (241 lb)   01/27/23 108.4 kg (239 lb)     BP Readings from Last 3 Encounters:   09/25/23 123/79   01/27/23 122/78   09/06/22 (!) 147/75         Current Outpatient Medications   Medication    Ascorbic Acid (VITAMIN C PO)    Cholecalciferol (VITAMIN D3 PO)    Coenzyme Q10 (COQ10 PO)    cyclobenzaprine (FLEXERIL) 5 MG tablet    diclofenac (VOLTAREN) 50 MG EC tablet    FOLIC ACID PO    Glucosamine HCl (GLUCOSAMINE PO)    Multiple Vitamins-Minerals (ZINC PO)    Omega-3 Fatty Acids (FISH OIL PO)    rosuvastatin (CRESTOR) 10 MG tablet    sildenafil (REVATIO) 20 MG tablet     No current facility-administered medications for this visit.       Social History     Tobacco Use    Smoking status: Never    Smokeless tobacco: Never   Vaping Use    Vaping Use: Never used   Substance Use Topics    Alcohol use: Yes     Comment: Once a week, 2-3 beers at a time    Drug use: Never       Health Maintenance Due   Topic Date Due    HEPATITIS B IMMUNIZATION (1 of 3 - 3-dose series) Never done    COVID-19 Vaccine (4 - Moderna series) 01/28/2022    INFLUENZA VACCINE (1) 09/01/2023       No results found for: PAP      September 25, 2023 1:39 PM    "

## 2023-09-26 LAB
CHOLEST SERPL-MCNC: 170 MG/DL
HDLC SERPL-MCNC: 49 MG/DL
LDLC SERPL CALC-MCNC: 88 MG/DL
NONHDLC SERPL-MCNC: 121 MG/DL
TRIGL SERPL-MCNC: 167 MG/DL

## 2023-12-26 NOTE — PROGRESS NOTES
Assessment & Plan   Problem List Items Addressed This Visit    None  Visit Diagnoses       Left-sided low back pain with left-sided sciatica, unspecified chronicity    -  Primary    Relevant Medications    predniSONE (DELTASONE) 20 MG tablet    Other Relevant Orders    Physical Therapy Referral           PT referral and steroid burst as ordered. Will monitor symptoms. Given patient history, if he feels symptoms continue to worsen acutely or do not improve with oral steroid burst I would certainly consider a referral to the Spine clinic.     28 minutes spent on the date of the encounter doing chart review, history and exam, documentation and further activities as noted.      MD SUHAS Culp [facility]    Nathalia Guillaume is a 58 year old, presenting for the following health issues:  Back Pain (Started 6-8 weeks ago, with frequent travel with work, but has worsened in the last two weeks, pain has started travelling down the L leg/Hx of surgery on L5 with similar pain down R leg/foot)      HPI   Back Pain  - history of previous back injury with RIGHT sciatica  - eventually had a microdiscectomy procedure which worked very well  - over the past few months he has started to notice familiar symptoms but now on the LEFT  - he has been flying a lot, sitting for long periods  - the past two weeks especially have been worse  - describes mostly pains in his LEFT buttocks, but occasionally radiating down further in his leg  - no numbness or weakness, no saddle paresthesia or incontinence issues  - has been working with a PT on neck issues that he really likes. He wonder if he could see her for PT for his lower back as well      Review of Systems   Constitutional, HEENT, cardiovascular, pulmonary, gi and gu systems are negative, except as otherwise noted.      Objective    /79 (BP Location: Left arm, Patient Position: Sitting, Cuff Size: Adult Large)   Pulse 61   Temp (!) 95.4  F (35.2  C)  (Skin)   Wt 110.7 kg (244 lb)   SpO2 99%   BMI 28.81 kg/m    Body mass index is 28.81 kg/m .  Physical Exam   GENERAL: healthy, alert and no distress  NECK: no adenopathy, no asymmetry, masses, or scars and thyroid normal to palpation  RESP: lungs clear to auscultation - no rales, rhonchi or wheezes  CV: regular rate and rhythm, normal S1 S2, no S3 or S4, no murmur, click or rub, no peripheral edema and peripheral pulses strong  ABDOMEN: soft, nontender, no hepatosplenomegaly, no masses and bowel sounds normal  MS: no gross musculoskeletal defects noted, no edema  Comprehensive back pain exam:  No tenderness, Range of motion not limited by pain, Lower extremity strength functional and equal on both sides, Lower extremity reflexes within normal limits bilaterally, Lower extremity sensation normal and equal on both sides, and Straight leg raise negative bilaterally

## 2023-12-27 ENCOUNTER — OFFICE VISIT (OUTPATIENT)
Dept: FAMILY MEDICINE | Facility: CLINIC | Age: 58
End: 2023-12-27
Payer: COMMERCIAL

## 2023-12-27 VITALS
HEART RATE: 61 BPM | OXYGEN SATURATION: 99 % | BODY MASS INDEX: 28.81 KG/M2 | WEIGHT: 244 LBS | DIASTOLIC BLOOD PRESSURE: 79 MMHG | TEMPERATURE: 95.4 F | SYSTOLIC BLOOD PRESSURE: 125 MMHG

## 2023-12-27 DIAGNOSIS — M54.42 LEFT-SIDED LOW BACK PAIN WITH LEFT-SIDED SCIATICA, UNSPECIFIED CHRONICITY: Primary | ICD-10-CM

## 2023-12-27 RX ORDER — PREDNISONE 20 MG/1
40 TABLET ORAL DAILY
Qty: 10 TABLET | Refills: 0 | Status: SHIPPED | OUTPATIENT
Start: 2023-12-27

## 2023-12-27 NOTE — NURSING NOTE
"Markell  58 year old    Chief Complaint   Patient presents with    Back Pain     Started 6-8 weeks ago, with frequent travel with work, but has worsened in the last two weeks, pain has started travelling down the L leg  Hx of surgery on L5 with similar pain down R leg/foot            Blood pressure 125/79, pulse 61, temperature (!) 95.4  F (35.2  C), temperature source Skin, weight 110.7 kg (244 lb), SpO2 99%. Body mass index is 28.81 kg/m .    Patient Active Problem List   Diagnosis    Neuropathy    Hypercholesteremia    Hammertoe of right foot    ED (erectile dysfunction)    Gastroesophageal reflux disease without esophagitis              Wt Readings from Last 2 Encounters:   12/27/23 110.7 kg (244 lb)   09/25/23 109.3 kg (241 lb)       BP Readings from Last 3 Encounters:   12/27/23 125/79   09/25/23 123/79   01/27/23 122/78                Current Outpatient Medications   Medication    Ascorbic Acid (VITAMIN C PO)    Cholecalciferol (VITAMIN D3 PO)    Coenzyme Q10 (COQ10 PO)    fluocinonide (LIDEX) 0.05 % external solution    FOLIC ACID PO    Glucosamine HCl (GLUCOSAMINE PO)    Multiple Vitamins-Minerals (ZINC PO)    Omega-3 Fatty Acids (FISH OIL PO)    rosuvastatin (CRESTOR) 10 MG tablet    sildenafil (VIAGRA) 100 MG tablet     No current facility-administered medications for this visit.              Social History     Tobacco Use    Smoking status: Never    Smokeless tobacco: Never   Vaping Use    Vaping Use: Never used   Substance Use Topics    Alcohol use: Yes     Comment: Once a week, 2-3 beers at a time    Drug use: Never              Health Maintenance Due   Topic Date Due    HEPATITIS B IMMUNIZATION (1 of 3 - 3-dose series) Never done    COVID-19 Vaccine (4 - 2023-24 season) 09/01/2023            No results found for: \"PAP\"           December 27, 2023 12:50 PM    "

## 2023-12-29 ENCOUNTER — THERAPY VISIT (OUTPATIENT)
Dept: PHYSICAL THERAPY | Facility: CLINIC | Age: 58
End: 2023-12-29
Payer: COMMERCIAL

## 2023-12-29 DIAGNOSIS — M54.42 ACUTE MIDLINE LOW BACK PAIN WITH LEFT-SIDED SCIATICA: Primary | ICD-10-CM

## 2023-12-29 PROCEDURE — 97140 MANUAL THERAPY 1/> REGIONS: CPT | Mod: GP

## 2023-12-29 PROCEDURE — 97161 PT EVAL LOW COMPLEX 20 MIN: CPT | Mod: GP

## 2023-12-29 PROCEDURE — 97110 THERAPEUTIC EXERCISES: CPT | Mod: GP

## 2023-12-29 NOTE — PROGRESS NOTES
PHYSICAL THERAPY EVALUATION  Type of Visit: Evaluation    See electronic medical record for Abuse and Falls Screening details.    Subjective   Pt presents with primary c/o left leg & buttock pain. Long history of back general stiffness & soreness. LBP increased in the last month with a lot of travel (sitting on planes, hotel beds etc.) and then started to have pain in the left buttock, hamstring, behind knee, calf. Similar sciatica pain in the right leg several years ago underwent partial discectomy which resolved those symptoms.   Denies weakness, losses of balance, or paresthesias.   Also reports hx of left knee arthritis/tightness & swelling - seemed a bit worse with longer walks, but better now.   Referral 12/27/23 from Dr. Triston Storm (family medicine).      Presenting condition or subjective complaint: Sciatica pain down into my butt and down my left leg.  Date of onset:      Relevant medical history: Arthritis; Pain at night or rest GERD, high cholesterol, idiopathic polyneuropathy  Dates & types of surgery: L5 discectomy 25 years ago.  Rotator cuff surgery on both shoulders.  4 and 8 years ago.    Prior diagnostic imaging/testing results: MRI     Prior therapy history for the same diagnosis, illness or injury: Yes I had surgery (discectomy) 25 years ago on my L5 for sciatica pain going down my right leg.  This new pain is going down my left leg.    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 Does have a sit to stand chair.   Hobbies/Interests:  Spending time with grandkids; going for  "walks.  Usual physical activity is limited due to a lot of travel at work.     Patient goals for therapy: Sitting, exercise, general pain causing discomfort and distraction.    Pain assessment: Pain present  Location: L hamstring & calf /Rating: \"a little achy\"     Objective   LUMBAR SPINE EVALUATION  PAIN: Pain Level at Rest: 0/10  Pain Level with Use: 6/10  Pain Location: Low back/waistline; Left buttock, intermittently the left calf, & behind knee. Very occasionally right glutes.   Pain Quality: Aching  Pain Frequency: intermittent or daily  Pain is Worst: nighttime - improving, pillow between knees  Pain is Exacerbated By: sitting; trying to sleep   Pain is Relieved By: steroid medications; some stretches; chiropractor (traction table & adjustments)   Pain Progression: worsened; slightly better since starting steroids & stretches    INTEGUMENTARY (edema, incisions): WNL  POSTURE: Sitting Posture: Fair/upright, slight slouch/decreased lordosis  -decrease in Left leg symptoms with Lumbar roll in sitting.   GAIT:   Weightbearing Status:  FWB  Assistive Device(s): None  Gait Deviations: WNL  BALANCE/PROPRIOCEPTION: Single Leg Stance Eyes Open (seconds): 10 sec bilat - proprio challenge bilat  WEIGHTBEARING ALIGNMENT: Knee WB Alignment:Genu varus L    ROM:   (Degrees) Left AROM Left PROM  Right AROM Right PROM   Hip Flexion  ~100  ~100   Hip Extension  hypomobile  hypomobile   Hip Abduction       Hip Adduction       Hip Internal Rotation  ~20  ~10   Hip External Rotation  ~50  ~55   Knee Flexion       Knee Extension       Lumbar Side glide     Lumbar Flexion WNL hands to toes, stretch in legs  X10 repeated: persists in calf, slightly better L thigh.    Lumbar Extension Min-mod loss loss hinge at TL junction, NE leg or back.   X10 repeated:  increase ext ROM, NE L calf     STRENGTH:  LEFT HIP: abduction 4/5, ER 4+/5, IR 4-/5    MYOTOMES: WNL Lumbar Myotomes 5/5   DTR S:    Left Right   L4 (Quad) 1 1   S1 (Achilles) 1 " 1     NEURAL TENSION:    Left Right   SLR Negative  Negative    SLR with DF Negative  Negative    Slump Negative  Negative      FLEXIBILITY: Decreased piriformis L, Decreased hamstrings L, Decreased hamstrings R  LUMBAR/HIP Special Tests:  ROBERT negative for leg or buttock pain      SPINAL SEGMENTAL CONCLUSIONS:    Left Right   L1 WNL WNL   L2 WNL WNL   L3 WNL WNL   L4 Hypo Hypo   L5 Hypo Hypo   S1 Hypo Hypo         Assessment & Plan   CLINICAL IMPRESSIONS  Medical Diagnosis:      Treatment Diagnosis:     Impression/Assessment: Patient is a 58 year old male with left low back & lower extremity complaints. The following significant findings have been identified: Pain, Decreased ROM/flexibility, Decreased joint mobility, Decreased strength, Impaired balance, Decreased proprioception, Impaired muscle performance, Decreased activity tolerance, and Impaired posture. These impairments interfere with their ability to perform self care tasks, work tasks, recreational activities, driving , household mobility, and community mobility as compared to previous level of function.   KEY PT FINDINGS:  1) Hypomobile lumbar extension - improves with press-ups  2) Low irritability without sx reproduction with SLR or slump  3) Intact LE strength, slight hip weakness on L      Clinical Decision Making (Complexity):  Clinical Presentation: Stable/Uncomplicated  Clinical Presentation Rationale: based on medical and personal factors listed in PT evaluation  Clinical Decision Making (Complexity): Low complexity    PLAN OF CARE  Treatment Interventions:  Interventions: Manual Therapy, Neuromuscular Re-education, Therapeutic Activity, Therapeutic Exercise, Self-Care/Home Management    Long Term Goals            Frequency of Treatment:    Duration of Treatment:      Recommended Referrals to Other Professionals: NA  Education Assessment:        Risks and benefits of evaluation/treatment have been explained.   Patient/Family/caregiver agrees with  Plan of Care.     Evaluation Time:           Signing Clinician: Hollie Maier PT

## 2024-01-10 ENCOUNTER — THERAPY VISIT (OUTPATIENT)
Dept: PHYSICAL THERAPY | Facility: CLINIC | Age: 59
End: 2024-01-10
Payer: COMMERCIAL

## 2024-01-10 DIAGNOSIS — M54.42 ACUTE MIDLINE LOW BACK PAIN WITH LEFT-SIDED SCIATICA: Primary | ICD-10-CM

## 2024-01-10 PROCEDURE — 97110 THERAPEUTIC EXERCISES: CPT | Mod: GP

## 2024-01-10 PROCEDURE — 97140 MANUAL THERAPY 1/> REGIONS: CPT | Mod: GP

## 2024-01-17 NOTE — TELEPHONE ENCOUNTER
DIAGNOSIS: right Foot hammer Toe    APPOINTMENT DATE: 24   NOTES STATUS DETAILS   OFFICE NOTE from referring provider SELF    OFFICE NOTE from other specialist Care Everywhere 19: Dr. Liza Banks    18: Roselyn Blevins DPM   MEDICATION LIST Internal    LABS     CBC/DIFF Care Everywhere 21   XRAYS (IMAGES & REPORTS) In process/Req [facility] Trackin 10/25/18: XR RT Toe

## 2024-01-23 ENCOUNTER — OFFICE VISIT (OUTPATIENT)
Dept: ORTHOPEDICS | Facility: CLINIC | Age: 59
End: 2024-01-23
Payer: COMMERCIAL

## 2024-01-23 ENCOUNTER — PRE VISIT (OUTPATIENT)
Dept: ORTHOPEDICS | Facility: CLINIC | Age: 59
End: 2024-01-23

## 2024-01-23 DIAGNOSIS — Q66.70 PES CAVUS: ICD-10-CM

## 2024-01-23 DIAGNOSIS — M20.31 HALLUX MALLEUS OF RIGHT FOOT: Primary | ICD-10-CM

## 2024-01-23 PROCEDURE — 99203 OFFICE O/P NEW LOW 30 MIN: CPT | Performed by: PODIATRIST

## 2024-01-23 NOTE — LETTER
1/23/2024         RE: Markell Garcia  [address] [id number]        Dear Colleague,    Thank you for referring your patient, Markell Garcia, to the [facility] [place]. Please see a copy of my visit note below.    Date of Service: 1/23/2024    Chief Complaint:   Chief Complaint   Patient presents with    Consult     Hammer toe, right foot.         HPI: Markell is a 58 year old male who presents today for further evaluation of right hallux. Toe has been curled over and causing blisters when he walks distance. Not much pain, as he has idiopathic neuropathy. This has been present for years.     Review of Systems: No n/v/d/f/c/ns/sob/cp    PMH:   Past Medical History:   Diagnosis Date    ED (erectile dysfunction)     Gastroesophageal reflux disease without esophagitis     Hammertoe of right foot     Hypercholesteremia     Neuropathy        PSxH:   Past Surgical History:   Procedure Laterality Date    DISCECTOMY LUMBAR MINIMALLY INVASIVE ONE LEVEL  2000    ROTATOR CUFF REPAIR RT/LT Left     ROTATOR CUFF REPAIR RT/LT Right        Allergies: Patient has no known allergies.    SH:   Social History     Socioeconomic History    Marital status:      Spouse name: Not on file    Number of children: Not on file    Years of education: Not on file    Highest education level: Not on file   Occupational History    Not on file   Tobacco Use    Smoking status: Never    Smokeless tobacco: Never   Vaping Use    Vaping Use: Never used   Substance and Sexual Activity    Alcohol use: Yes     Comment: Once a week, 2-3 beers at a time    Drug use: Never    Sexual activity: Yes     Partners: Female     Birth control/protection: Post-menopausal     Comment: exclusive with partner   Other Topics Concern    Not on file   Social History Narrative    Moved to [place] 2022 from [place]    Lives with wife    Works in supply chain distribution, works remotely and travels frequently    One brother  in [place], one in [place], [place]     Daughter is local     Social Determinants of Health     Financial Resource Strain: Low Risk  (9/24/2023)    Financial Resource Strain     Within the past 12 months, have you or your family members you live with been unable to get utilities (heat, electricity) when it was really needed?: No   Food Insecurity: Low Risk  (9/24/2023)    Food Insecurity     Within the past 12 months, did you worry that your food would run out before you got money to buy more?: No     Within the past 12 months, did the food you bought just not last and you didn t have money to get more?: No   Transportation Needs: Low Risk  (9/24/2023)    Transportation Needs     Within the past 12 months, has lack of transportation kept you from medical appointments, getting your medicines, non-medical meetings or appointments, work, or from getting things that you need?: No   Physical Activity: Not on file   Stress: Not on file   Social Connections: Not on file   Interpersonal Safety: Low Risk  (12/27/2023)    Interpersonal Safety     Do you feel physically and emotionally safe where you currently live?: Yes     Within the past 12 months, have you been hit, slapped, kicked or otherwise physically hurt by someone?: No     Within the past 12 months, have you been humiliated or emotionally abused in other ways by your partner or ex-partner?: No   Housing Stability: Low Risk  (9/24/2023)    Housing Stability     Do you have housing? : Yes     Are you worried about losing your housing?: No       FH:   Family History   Problem Relation Age of Onset    Hammer toes Mother     Seizure Disorder Mother     Hyperlipidemia Father     Insulin Resistance Father     Hyperlipidemia Paternal Grandfather     Diabetes Type 2  Paternal Uncle     Peripheral Neuropathy Paternal Uncle     Coronary Artery Disease No family hx of     Cerebrovascular Disease No family hx of     Prostate Cancer No family hx of     Colon Cancer No family  hx of        Objective:  Data Unavailable Data Unavailable Data Unavailable Data Unavailable Data Unavailable 0 lbs 0 oz    PT and DP pulses are 2/4 bilaterally. CRT is instant. Positive pedal hair.   Gross sensation is diminished bilaterally.   Equinus is noted bilaterally. No pain with active or passive ROM of the ankle, MTJ, 1st ray, or halluces bilaterally,. Pes cavus noted BL with extensor substituted hammertoes to lesser digits. Rigid hallux malleus to the right 1st toe.  Nails normal bilaterally. No open lesions are noted.     Assessment:   Encounter Diagnoses   Name Primary?    Hallux malleus of right foot Yes    Pes cavus          Plan:  - Pt seen and evaluated.  - We discussed potential treatments. His deformity is rigid. He can try silicone toe caps, however he may need surgical correction. We discussed this. Will defer this options for now.   - See again PRN.                Ben Garibay, ABHINAV

## 2024-01-24 NOTE — PROGRESS NOTES
Date of Service: 1/23/2024    Chief Complaint:   Chief Complaint   Patient presents with    Consult     Hammer toe, right foot.         HPI: Markell is a 58 year old male who presents today for further evaluation of right hallux. Toe has been curled over and causing blisters when he walks distance. Not much pain, as he has idiopathic neuropathy. This has been present for years.     Review of Systems: No n/v/d/f/c/ns/sob/cp    PMH:   Past Medical History:   Diagnosis Date    ED (erectile dysfunction)     Gastroesophageal reflux disease without esophagitis     Hammertoe of right foot     Hypercholesteremia     Neuropathy        PSxH:   Past Surgical History:   Procedure Laterality Date    DISCECTOMY LUMBAR MINIMALLY INVASIVE ONE LEVEL  2000    ROTATOR CUFF REPAIR RT/LT Left     ROTATOR CUFF REPAIR RT/LT Right        Allergies: Patient has no known allergies.    SH:   Social History     Socioeconomic History    Marital status:      Spouse name: Not on file    Number of children: Not on file    Years of education: Not on file    Highest education level: Not on file   Occupational History    Not on file   Tobacco Use    Smoking status: Never    Smokeless tobacco: Never   Vaping Use    Vaping Use: Never used   Substance and Sexual Activity    Alcohol use: Yes     Comment: Once a week, 2-3 beers at a time    Drug use: Never    Sexual activity: Yes     Partners: Female     Birth control/protection: Post-menopausal     Comment: exclusive with partner   Other Topics Concern    Not on file   Social History Narrative    Moved to [place] 2022 from [place]    Lives with wife    Works in supply chain distribution, works remotely and travels frequently    One brother in [place], one in [place], [place]     Daughter is local     Social Determinants of Health     Financial Resource Strain: Low Risk  (9/24/2023)    Financial Resource Strain     Within the past 12 months, have you or your family members you live with been unable  to get utilities (heat, electricity) when it was really needed?: No   Food Insecurity: Low Risk  (9/24/2023)    Food Insecurity     Within the past 12 months, did you worry that your food would run out before you got money to buy more?: No     Within the past 12 months, did the food you bought just not last and you didn t have money to get more?: No   Transportation Needs: Low Risk  (9/24/2023)    Transportation Needs     Within the past 12 months, has lack of transportation kept you from medical appointments, getting your medicines, non-medical meetings or appointments, work, or from getting things that you need?: No   Physical Activity: Not on file   Stress: Not on file   Social Connections: Not on file   Interpersonal Safety: Low Risk  (12/27/2023)    Interpersonal Safety     Do you feel physically and emotionally safe where you currently live?: Yes     Within the past 12 months, have you been hit, slapped, kicked or otherwise physically hurt by someone?: No     Within the past 12 months, have you been humiliated or emotionally abused in other ways by your partner or ex-partner?: No   Housing Stability: Low Risk  (9/24/2023)    Housing Stability     Do you have housing? : Yes     Are you worried about losing your housing?: No       FH:   Family History   Problem Relation Age of Onset    Hammer toes Mother     Seizure Disorder Mother     Hyperlipidemia Father     Insulin Resistance Father     Hyperlipidemia Paternal Grandfather     Diabetes Type 2  Paternal Uncle     Peripheral Neuropathy Paternal Uncle     Coronary Artery Disease No family hx of     Cerebrovascular Disease No family hx of     Prostate Cancer No family hx of     Colon Cancer No family hx of        Objective:  Data Unavailable Data Unavailable Data Unavailable Data Unavailable Data Unavailable 0 lbs 0 oz    PT and DP pulses are 2/4 bilaterally. CRT is instant. Positive pedal hair.   Gross sensation is diminished bilaterally.   Equinus is noted  bilaterally. No pain with active or passive ROM of the ankle, MTJ, 1st ray, or halluces bilaterally,. Pes cavus noted BL with extensor substituted hammertoes to lesser digits. Rigid hallux malleus to the right 1st toe.  Nails normal bilaterally. No open lesions are noted.     Assessment:   Encounter Diagnoses   Name Primary?    Hallux malleus of right foot Yes    Pes cavus          Plan:  - Pt seen and evaluated.  - We discussed potential treatments. His deformity is rigid. He can try silicone toe caps, however he may need surgical correction. We discussed this. Will defer this options for now.   - See again PRN.

## 2024-01-25 ENCOUNTER — THERAPY VISIT (OUTPATIENT)
Dept: PHYSICAL THERAPY | Facility: CLINIC | Age: 59
End: 2024-01-25
Payer: COMMERCIAL

## 2024-01-25 DIAGNOSIS — M54.42 ACUTE MIDLINE LOW BACK PAIN WITH LEFT-SIDED SCIATICA: Primary | ICD-10-CM

## 2024-01-25 PROCEDURE — 97140 MANUAL THERAPY 1/> REGIONS: CPT | Mod: GP

## 2024-01-25 PROCEDURE — 97110 THERAPEUTIC EXERCISES: CPT | Mod: GP

## 2024-02-02 ENCOUNTER — THERAPY VISIT (OUTPATIENT)
Dept: PHYSICAL THERAPY | Facility: CLINIC | Age: 59
End: 2024-02-02
Payer: COMMERCIAL

## 2024-02-02 DIAGNOSIS — M54.42 ACUTE MIDLINE LOW BACK PAIN WITH LEFT-SIDED SCIATICA: Primary | ICD-10-CM

## 2024-02-02 PROCEDURE — 97530 THERAPEUTIC ACTIVITIES: CPT | Mod: GP

## 2024-02-02 PROCEDURE — 97110 THERAPEUTIC EXERCISES: CPT | Mod: GP

## 2024-02-12 ENCOUNTER — THERAPY VISIT (OUTPATIENT)
Dept: PHYSICAL THERAPY | Facility: CLINIC | Age: 59
End: 2024-02-12
Payer: COMMERCIAL

## 2024-02-12 DIAGNOSIS — M54.42 ACUTE MIDLINE LOW BACK PAIN WITH LEFT-SIDED SCIATICA: Primary | ICD-10-CM

## 2024-02-12 PROCEDURE — 97140 MANUAL THERAPY 1/> REGIONS: CPT | Mod: GP

## 2024-02-12 PROCEDURE — 97110 THERAPEUTIC EXERCISES: CPT | Mod: GP

## 2024-02-12 NOTE — PROGRESS NOTES
"   02/12/24 0500   Appointment Info   Signing clinician's name / credentials Hollie Maier, PT DPT [facility]   Total/Authorized Visits [employer] (6 plan)   Visits Used 5   Medical Diagnosis Left-sided low back pain with left-sided sciatica   PT Tx Diagnosis low back pain with radiating pain; sciatica   Other pertinent information ? fibular nerve entrapment vs knee internal derangement/OA   Progress Note/Certification   Onset of illness/injury or Date of Surgery 11/29/23   Therapy Frequency 1x/week tapering to 2x/month   Predicted Duration 2 months   Progress Note Completed Date 12/29/23   GOALS   PT Goals 2   PT Goal 1   Goal Identifier Sitting   Goal Description Pt will be able to sit at least 1 hour not limited by leg or back pain   Rationale to maximize safety and independence with performance of ADLs and functional tasks;to maximize safety and independence with transportation  (work and airplane travel)   Goal Progress varies; still will provoke pain during airplane travel   Target Date 02/29/24   PT Goal 2   Goal Identifier Ambulation   Goal Description Pt will be able to walk at least 30 mins not limited by leg pain.   Rationale to maximize safety and independence with performance of ADLs and functional tasks;to maximize safety and independence with self cares   Goal Progress hasn't had time to try longer walks   Target Date 02/29/24   Subjective Report   Subjective Report Still quite sore; continuing to have pain in the low back to left  bottom (laying on stomach michael helps a little); ongoing lateral knee/calf pain worse with sitting with knees bent. If he has to sit with knees bent it can get unbearable. Besides that it is \"annoying\". No single exercise or stretch specifically seems to make things worse, but occasionally will be more sore after exercises. Was on vacation recently and walking on the beach was difficult.   Objective Measures   Objective Measures Objective Measure 1;Objective Measure 2;Objective Measure " 3;Objective Measure 4   Objective Measure 1   Objective Measure Symptoms   Details currently mild ache at left mid calf, lateral; and slightly left low back.   Objective Measure 2   Objective Measure Lumbar AROM   Details flexion WFL (repeated flex in stand - slightly W calf & L low back). extension min loss (repeated ext in stand - worse calf & L low back). Sideglides: min loss bilateral, x10 each way no significant change (decreased L calf slightly after shoulders L/hips R).   Objective Measure 3   Details slump: provokes lateral knee pain   Objective Measure 4   Details Very tender lateral proximal calf soft tissues just iniferior to fibular head.   Treatment Interventions (PT)   Interventions Therapeutic Procedure/Exercise;Manual Therapy;Therapeutic Activity   Therapeutic Procedure/Exercise   Therapeutic Procedures: strength, endurance, ROM, flexibillity minutes (39781) 15   Therapeutic Procedures Ther Proc 2   Ther Proc 1 - Details repeated movements testing & treatment. time to interpret results with patient   Ther Proc 2 Sideglides   Ther Proc 2 - Details x10 shoulders Left at wall, cues for proper form, instruct in rationale & expected sx response. will try sideglides to assess response this week - continue if no change or better; switch back to press-ups if worse/peripheralized   PTRx Ther Proc 2 Standing Gastroc Stretch   PTRx Ther Proc 2 - Details review/continue per HEP   PTRx Ther Proc 3 Eccentric Gastroc Stretch   PTRx Ther Proc 3 - Details review/continue per HEP   PTRx Ther Proc 5 Slump Tensioners   PTRx Ther Proc 5 - Details x20 with ankle DF+INV. no symptoms reports feels good   PTRx Ther Proc 7 Seated Hamstring Stretch   PTRx Ther Proc 7 - Details reviewed with videos - continue per HEP   PTRx Ther Proc 8 Piriformis Stretch Below 90 Degrees Supine   PTRx Ther Proc 8 - Details reviewed with videos - continue per HEP   PTRx Ther Proc 9 birddog   PTRx Ther Proc 9 - Details reviewed with videos -  continue per HEP   PTRx Ther Proc 10 Hip Abduction Straight Leg Raise   PTRx Ther Proc 10 - Details reports feels challenging/weak; relieving   PTRx Ther Proc 11 Bridges with Theraband   PTRx Ther Proc 11 - Details reviewed with videos - continue per HEP   Skilled Intervention HEP instruction, dosage, rationale   Patient Response/Progress receptive, no significant change in leg pain   Therapeutic Activity   Therapeutic Activities: dynamic activities to improve functional performance minutes (62459) 5   Ther Act 1 reassessment/testing; discussed fibular nerve anatomy & possible etiologies including proximal nerve compression, peripheral nerve compression, knee deragement, combination of all   PTRx Ther Act 1 Posture Correction with Lumbar Roll   PTRx Ther Act 1 - Details pt ed - importance of low back support when sitting (couch airplanes); avoid couch slouch change positions as often as able. reviewed & reinforced   PTRx Ther Act 2 discussed f/u with MD and ongoing workup;consider imaging   Skilled Intervention self-management techniques for sx management   Manual Therapy   Manual Therapy: Mobilization, MFR, MLD, friction massage minutes (07171) 15   Manual Therapy Manual Therapy 2;Manual Therapy 3   Manual Therapy 1 Lumbar PAs   Manual Therapy 1 - Details UPA left L4/5 - L5/S1   Manual Therapy 2 STM   Manual Therapy 2 - Details mod-deep pressure left glute med/piriformis in sidelying & prone with hip mobilization   Skilled Intervention joint & soft tissue techniques to increase ROM & decrease pain   Patient Response/Progress tender but good with STM; no significant change in gait after   Manual Therapy 3 Long axis hip distraction   Manual Therapy 3 - Details 2x 2 mins LLE - feels more in knee than hip   Education   Learner/Method Patient   Plan   Home program videos   Updates to plan of care trial of shoulders left sideglides; continue gastroc mobility & strengthening   Plan for next session response to sideglides.  work on L hip ROm. manual for gastroc/fibularis muscles; fibular head mobs?. response to adjusted nerve glides; re-visit proximal strength   Total Session Time   Timed Code Treatment Minutes 35   Total Treatment Time (sum of timed and untimed services) 35

## 2024-02-14 NOTE — PROGRESS NOTES
ASSESSMENT AND PLAN:       (M54.42,  G89.29) Chronic left-sided low back pain with left-sided sciatica  (primary encounter diagnosis)  Comment: Chronic, progressive.  Not improving with conservative treatment. Discussed options for next steps  Start with imaging to look for source of radicular symptoms.  Refer to spine center to discuss procedural treatments like CSI.  Continue PT.    Plan: MRI Lumbar spine w/o contrast, Spine          Referral              Bala Villasenor MD   [facility]  02/16/2024, 12:56 PM      SUBJECTIVE:   Markell is a 58 year old male who presents to clinic today for a return visit.      # Back Pain  Location: left low back - left leg pain is worse than back/buttocks)  Duration: started end of December with no clear triggers  Radiation: into bilateral buttocks initially, right leg eventually improved, still radiates into left leg intermittently thigh down to foot  Numbness/ Tingling? no  Weakness? no  Trauma? no  Flexion or Extension bias: no, worse with both in PT  Red flags? No history of cancer, unexplained weight loss, bladder or bowel incontinence, bladder retention, prolonged steroid use, fever  Meds tried? Ibuprofen 600mg 1-2 times a day (has been trying to cut back), helps with night pains when he's trying to sleep  PT? Yes, working with PT since early last month, 5 visits so far    - saw my colleague Dr. Storm 12/27/23, prescribed steroid burst  - PT and prednisone seemed to helped for a few weeks  - the last 1-2 weeks has worsened again    - previously had microdiscectomy in lumbar spine in 2000  - has known OA in left knee    Patient Active Problem List   Diagnosis    Neuropathy    Hypercholesteremia    Hammertoe of right foot    ED (erectile dysfunction)    Gastroesophageal reflux disease without esophagitis    Acute midline low back pain with left-sided sciatica     Current Outpatient Medications   Medication    Ascorbic Acid (VITAMIN C PO)     Cholecalciferol (VITAMIN D3 PO)    Coenzyme Q10 (COQ10 PO)    fluocinonide (LIDEX) 0.05 % external solution    FOLIC ACID PO    Glucosamine HCl (GLUCOSAMINE PO)    Multiple Vitamins-Minerals (ZINC PO)    Omega-3 Fatty Acids (FISH OIL PO)    rosuvastatin (CRESTOR) 10 MG tablet    sildenafil (VIAGRA) 100 MG tablet    predniSONE (DELTASONE) 20 MG tablet     No current facility-administered medications for this visit.       I have reviewed the patient's relevant past medical history.     OBJECTIVE:   BP (!) 146/87   Pulse 61   Temp 96.9  F (36.1  C) (Skin)   Wt 108.4 kg (239 lb)   SpO2 98%   BMI 28.22 kg/m      Constitutional: well-appearing, appears stated age  Eyes: conjunctivae without erythema, sclera anicteric.     Left Knee:  ROM: 0-130; Crepitus: no  Strength: Full in flexion/ extension - no pain with resistance  Tenderness: Patella - no Medial joint line - no; Lateral joint line - no; Quad tendon - no; Patellar tendon- no     BACK:  Bony tenderness: None  Paraspinal tenderness:   None.    Sensation: intact in b/l lower extremities.    Strength: 5/5 w/ knee flexion/ extension, hip flexion/extension  Maneuvers:  Neg straight leg raise b/l. Neg ROBERT on left

## 2024-02-16 ENCOUNTER — OFFICE VISIT (OUTPATIENT)
Dept: FAMILY MEDICINE | Facility: CLINIC | Age: 59
End: 2024-02-16
Payer: COMMERCIAL

## 2024-02-16 VITALS
HEART RATE: 61 BPM | BODY MASS INDEX: 28.22 KG/M2 | SYSTOLIC BLOOD PRESSURE: 146 MMHG | WEIGHT: 239 LBS | OXYGEN SATURATION: 98 % | TEMPERATURE: 96.9 F | DIASTOLIC BLOOD PRESSURE: 87 MMHG

## 2024-02-16 DIAGNOSIS — G89.29 CHRONIC LEFT-SIDED LOW BACK PAIN WITH LEFT-SIDED SCIATICA: Primary | ICD-10-CM

## 2024-02-16 DIAGNOSIS — M54.42 CHRONIC LEFT-SIDED LOW BACK PAIN WITH LEFT-SIDED SCIATICA: Primary | ICD-10-CM

## 2024-02-16 NOTE — NURSING NOTE
Markell  58 year old    Chief Complaint   Patient presents with    Back Pain     Saw Dr. Storm for back pain about a month ago, was given steroids for 3-4 days which was helpful - sciatica has worsened in the last few weeks, pain running into LEFT knee and calf, pt is seeing PT for back. Takes ibuprofen sometimes for the pain. Pain has affected his walking, sleep, and work.            Blood pressure (!) 142/88, pulse 65, temperature 96.9  F (36.1  C), temperature source Skin, weight 108.4 kg (239 lb), SpO2 98%. Body mass index is 28.22 kg/m .    Patient Active Problem List   Diagnosis    Neuropathy    Hypercholesteremia    Hammertoe of right foot    ED (erectile dysfunction)    Gastroesophageal reflux disease without esophagitis    Acute midline low back pain with left-sided sciatica              Wt Readings from Last 2 Encounters:   02/16/24 108.4 kg (239 lb)   12/27/23 110.7 kg (244 lb)       BP Readings from Last 3 Encounters:   02/16/24 (!) 142/88   12/27/23 125/79   09/25/23 123/79                Current Outpatient Medications   Medication    Ascorbic Acid (VITAMIN C PO)    Cholecalciferol (VITAMIN D3 PO)    Coenzyme Q10 (COQ10 PO)    fluocinonide (LIDEX) 0.05 % external solution    FOLIC ACID PO    Glucosamine HCl (GLUCOSAMINE PO)    Multiple Vitamins-Minerals (ZINC PO)    Omega-3 Fatty Acids (FISH OIL PO)    rosuvastatin (CRESTOR) 10 MG tablet    sildenafil (VIAGRA) 100 MG tablet    predniSONE (DELTASONE) 20 MG tablet     No current facility-administered medications for this visit.              Social History     Tobacco Use    Smoking status: Never    Smokeless tobacco: Never   Vaping Use    Vaping Use: Never used   Substance Use Topics    Alcohol use: Yes     Comment: Once a week, 2-3 beers at a time    Drug use: Never              Health Maintenance Due   Topic Date Due    HEPATITIS B IMMUNIZATION (1 of 3 - 3-dose series) Never done    COVID-19 Vaccine (3 - Moderna risk series) 12/31/2021            No  "results found for: \"PAP\"           February 16, 2024 10:53 AM    "

## 2024-02-16 NOTE — PATIENT INSTRUCTIONS
[address] [place] [address]  [phone]        If the spine center doesn't call you within a few business days, call [phone].

## 2024-02-26 ENCOUNTER — HOSPITAL ENCOUNTER (OUTPATIENT)
Dept: MRI IMAGING | Facility: CLINIC | Age: 59
Discharge: HOME | End: 2024-02-26
Attending: FAMILY MEDICINE | Admitting: FAMILY MEDICINE
Payer: COMMERCIAL

## 2024-02-26 DIAGNOSIS — G89.29 CHRONIC LEFT-SIDED LOW BACK PAIN WITH LEFT-SIDED SCIATICA: ICD-10-CM

## 2024-02-26 DIAGNOSIS — M54.42 CHRONIC LEFT-SIDED LOW BACK PAIN WITH LEFT-SIDED SCIATICA: ICD-10-CM

## 2024-02-26 PROCEDURE — 72148 MRI LUMBAR SPINE W/O DYE: CPT

## 2024-03-01 ENCOUNTER — OFFICE VISIT (OUTPATIENT)
Dept: PHYSICAL MEDICINE AND REHAB | Facility: CLINIC | Age: 59
End: 2024-03-01
Attending: FAMILY MEDICINE
Payer: COMMERCIAL

## 2024-03-01 VITALS
HEART RATE: 67 BPM | HEIGHT: 77 IN | WEIGHT: 240 LBS | DIASTOLIC BLOOD PRESSURE: 70 MMHG | SYSTOLIC BLOOD PRESSURE: 108 MMHG | BODY MASS INDEX: 28.34 KG/M2 | OXYGEN SATURATION: 99 %

## 2024-03-01 DIAGNOSIS — M51.369 LUMBAR DEGENERATIVE DISC DISEASE: ICD-10-CM

## 2024-03-01 DIAGNOSIS — M54.42 CHRONIC LEFT-SIDED LOW BACK PAIN WITH LEFT-SIDED SCIATICA: ICD-10-CM

## 2024-03-01 DIAGNOSIS — M54.16 LUMBAR RADICULOPATHY: Primary | ICD-10-CM

## 2024-03-01 DIAGNOSIS — G89.29 CHRONIC LEFT-SIDED LOW BACK PAIN WITH LEFT-SIDED SCIATICA: ICD-10-CM

## 2024-03-01 DIAGNOSIS — M47.816 LUMBAR SPONDYLOSIS: ICD-10-CM

## 2024-03-01 PROCEDURE — 99204 OFFICE O/P NEW MOD 45 MIN: CPT | Performed by: STUDENT IN AN ORGANIZED HEALTH CARE EDUCATION/TRAINING PROGRAM

## 2024-03-01 ASSESSMENT — PAIN SCALES - GENERAL: PAINLEVEL: MILD PAIN (2)

## 2024-03-01 NOTE — PATIENT INSTRUCTIONS
~Spine Center Scheduling #[phone].  ~Please call our [facility] Spine Nurse Navigation #[phone] with any questions or concerns about your treatment plan, if symptoms worsen and you would like to be seen urgently, or if you have problems controlling bladder and bowel function.  ~For any future flareups or new symptoms, recommend follow-up in clinic or contact the nurse navigator line.  ~Please note that any My Chart messages may take multiple days for a response due to the high volume of patients seen in clinic.  Anything sent Friday night or after will be answered the following week when able.     An injection has been ordered today to potentially help with your pain symptoms. These injections do not fix what is going on in your back, therefore they typically do not take away the pain completely, however they can many times help improve symptoms. Injections should always be completed along with other modalities such as physical therapy for the best long term outcomes. If injections alone are done, then pain will likely return.     [facility] Injection Requirements    A  is required for all fluoroscopically-guided injections.  Injection appointments may be cancelled if there are signs/symptoms of an active infection or if the patient is being actively treated with antibiotics for a diagnosed infection.  Patients may have their steroid injection cancelled if they have had another steroid injection within 2 weeks.  Diabetic patients will have their blood glucose levels checked the day of their injection and the appointment will be rescheduled if the blood glucose level is 300 or higher.  Patients with allergies to cortisone, local anesthetics, iodine, or contrast dye should contact the Spine Center to further discuss these considerations.  Patients scheduled for medial branch block diagnostic injections should refrain from taking pain medication the day of the  procedure.  The medial branch block injection appointment will be rescheduled if the patient's pain rating is not 5/10 or greater at the time of the procedure.  Patients taking warfarin/Coumadin will have their INR checked the day of the procedure and the procedure may be rescheduled if the INR is greater than 3.0.  Please contact the Spine Center (#[phone]) if you are taking any prescription blood-thinning medications (warfarin, Plavix, Lovenox, Eliquis, Brilinta, Effient, etc.) as special dosing adjustments may need to be made depending on the type of injection you are scheduled to receive.  It is recommended that you delay having your steroid injection if you have received a flu shot or shingles vaccine within 2 weeks.

## 2024-03-01 NOTE — LETTER
3/1/2024         RE: Markell Garcia  [address] [address]        Dear Colleague,    Thank you for referring your patient, Markell Garcia, to the [facility]. Please see a copy of my visit note below.    ASSESSMENT:  Markell Garcia is a 58 year old male presents for consultation at the request of Bala Villasenor who presents today for new patient evaluation of :         Diagnoses and all orders for this visit:  Lumbar radiculopathy  -     PAIN Transforaminal JUS Inj Lumbosacral Left; Future  Chronic left-sided low back pain with left-sided sciatica  -     Spine  Referral  -     PAIN Transforaminal JUS Inj Lumbosacral Left; Future  Lumbar degenerative disc disease  Lumbar spondylosis       Patient is neurologically intact on exam. No myelopathic or red flag symptoms.    Patient is a 58-year-old male with history of right-sided low back pain and lumbar radiculopathy status post right hemilaminectomy who presents with left-sided low back pain and lumbosacral radiculopathy approximating an L5 distribution.  He is neurologically intact, and we reviewed his MRI which shows a disc extrusion at the L4-L5 level impinging the traversing L5 nerve root, and L5-S1 foraminal stenosis.  In light of this, and the fact that the patient has tried oral steroids and PT without significant improvement, we discussed doing epidural steroid injections to target both levels with an aim to address the L5 radiculopathy.  Patient is in agreement with this plan, all questions addressed.    PLAN:  Reviewed spine anatomy and disease process. Discussed diagnosis and treatment options with the patient today. A shared decision making model was used. The patient's values and choices were respected. The following represents what was discussed and decided upon by the provider and the patient.    1. DIAGNOSTIC TESTS  No new imaging orders at this time.    2. PHYSICAL THERAPY  Patient  completed physical therapy from December 2023 through February 2024  Patient is already performing a home program, and was encouraged to continue doing so.  Discussed the importance of core strengthening, ROM, stretching exercises with the patient and how each of these entities is important in decreasing pain.  Explained to the patient that the purpose of physical therapy is to teach the patient a home exercise program.  These exercises need to be performed every day in order to decrease pain and prevent future occurrences of pain.  Likened it to brushing one's teeth.      3. MEDICATIONS:  Discussed multiple medication options today with patient. Discussed risks, side effects, and proper use of medications. Patient verbalized understanding.  No new medications ordered at this visit.  Patient advised to call our clinic's nurse navigation line (number provided) if they experience any side effects or intolerances with prescribed medication.    4. INTERVENTIONS:  Left L4-L5 and L5-S1 Transforaminal Epidural Steroid Injection  Benefits of epidural steroid injection were reviewed including potential improvements in pain, function, and sleep. Potential risks also reviewed, including but not limited to bleeding, bruising, infection, increased or non-improvement of pain, injury to local structures, spinal infection or bleeding, nerve or spinal cord injury which could be temporary or permanent. Risks of steroid use can include increased blood sugar or blood pressure, hormonal disruption, increased fracture risk. After reviewing the risks, benefits, and alternatives, the patient was given an opportunity to ask questions. All questions were addressed, and the patient wishes to move forward with the selected injection.  Patient would be a good candidate for spinal cord stimulation if JUS is of limited benefit and patient is deemed not to be a surgical candidate    5. OTHER REFERRALS:  No other referrals at this time.    6.  FOLLOW-UP  In approximately 2-4 weeks to assess response to injection.  Patient advised to contact our office for earlier appointment if symptoms worsening or not improving, or any side effects are noticed.    Advised patient to call the Spine Center if symptoms worsen or you have problems controlling bladder and bowel function.   ______________________________________________________________________    SUBJECTIVE:   Markell Garcia  is a 58 year old male who presents today for new patient evaluation.    Patient reports since early to mid December 2023 he has had progressively worsening left-sided low back pain which travels down the back of his left leg before wrapping around to the lateral calf.  He has a history of similar symptoms on the right side which presented far more intensely and he ultimately underwent right L5-S1 hemilaminectomy 25 years ago.  In comparison to that, the pain is sharp, is more dull but is in the back and the leg both.  He denies any jorge numbness or weakness of the leg, no bladder or bowel incontinence.  Reports that due to the pain, he is limited in length of walking and has difficulty getting out of a car.    Previously completed physical therapy from December 2023 to February 2024.  Feels like it gave some initial benefit, but has not had any long-term improvement of symptoms.  He also reports doing a short course of prednisone prescribed by his primary care, which again was of some but minimal benefit.    -Treatment to Date: As above      Oswestry (ALEE) Questionnaire        2/23/2024     3:31 PM   OSWESTRY DISABILITY INDEX   Count 10   Sum 16   Oswestry Score (%) 32 %       Neck Disability Index:      2/23/2024     3:34 PM   Neck Disability Index (  Darian H. and Eugenie HASKINS. 1991. All rights reserved.; used with permission)   SECTION 1 - PAIN INTENSITY 0   SECTION 2 - PERSONAL CARE 0   SECTION 3 - LIFTING 0   SECTION 4 - READING 0   SECTION 5 - HEADACHES 1   SECTION 6 -  CONCENTRATION 1   SECTION 7 - WORK 3   SECTION 8 - DRIVING 0   SECTION 9 - SLEEPING 1   SECTION 10 - RECREATION 0   Count 10   Sum 6   Raw Score: /50 6   Neck Disability Index Score: (%) 12 %              -Medications:    Current Outpatient Medications   Medication     Ascorbic Acid (VITAMIN C PO)     Cholecalciferol (VITAMIN D3 PO)     Coenzyme Q10 (COQ10 PO)     fluocinonide (LIDEX) 0.05 % external solution     FOLIC ACID PO     Glucosamine HCl (GLUCOSAMINE PO)     Multiple Vitamins-Minerals (ZINC PO)     Omega-3 Fatty Acids (FISH OIL PO)     rosuvastatin (CRESTOR) 10 MG tablet     sildenafil (VIAGRA) 100 MG tablet     predniSONE (DELTASONE) 20 MG tablet     No current facility-administered medications for this visit.       No Known Allergies    Past Medical History:   Diagnosis Date     ED (erectile dysfunction)      Gastroesophageal reflux disease without esophagitis      Hammertoe of right foot      Hypercholesteremia      Neuropathy         Patient Active Problem List   Diagnosis     Neuropathy     Hypercholesteremia     Hammertoe of right foot     ED (erectile dysfunction)     Gastroesophageal reflux disease without esophagitis     Acute midline low back pain with left-sided sciatica       Past Surgical History:   Procedure Laterality Date     DISCECTOMY LUMBAR MINIMALLY INVASIVE ONE LEVEL  2000     ROTATOR CUFF REPAIR RT/LT Left      ROTATOR CUFF REPAIR RT/LT Right        Family History   Problem Relation Age of Onset     Hammer toes Mother      Seizure Disorder Mother      Hyperlipidemia Father      Insulin Resistance Father      Hyperlipidemia Paternal Grandfather      Diabetes Type 2  Paternal Uncle      Peripheral Neuropathy Paternal Uncle      Coronary Artery Disease No family hx of      Cerebrovascular Disease No family hx of      Prostate Cancer No family hx of      Colon Cancer No family hx of        Reviewed past medical, surgical, and family history with patient found on new patient intake packet  "located in EMR Media tab.     SOCIAL HX: Reviewed    ROS: Specifically negative for bowel/bladder dysfunction, balance changes, headache, dizziness, foot drop, fevers, chills, appetite changes, nausea/vomiting, unexplained weight loss. Otherwise 13 systems reviewed are negative. Please see the patient's intake questionnaire from today for details.    OBJECTIVE:  /70 (BP Location: Right arm, Patient Position: Sitting, Cuff Size: Adult Regular)   Pulse 67   Ht 6' 5\" (1.956 m)   Wt 240 lb (108.9 kg)   SpO2 99%   BMI 28.46 kg/m      PHYSICAL EXAMINATION:  --CONSTITUTIONAL: Vital signs as above. No acute distress. The patient is well nourished and well groomed.  --PSYCHIATRIC: The patient is awake, alert, oriented to person, place, time and answering questions appropriately with clear speech. Appropriate mood and affect   --RESPIRATORY: Normal rhythm and effort. No abnormal accessory muscle breathing patterns noted.   --GROSS MOTOR: Easily arises from a seated position.  --LUMBAR SPINE: Inspection reveals no evidence of deformity. Range of motion is not limited in flexion, extension, lateral rotation. Mild tenderness to palpation of lumbar paraspinals, no tenderness in spinous processes.  Facet loading (Mane test) negative, seated SLR negative bilaterally  --HIPS: Full range of motion bilaterally. Negative ROBERT test.  --LOWER EXTREMITY MOTOR TESTING:  Hip flexion left 5/5, right 5/5  Knee extension left 5/5, right 5/5  Ankle dorsiflexors left 5/5, right 5/5  Ankle plantarflexors left 5/5, right 5/5   Great toe extension left 5/5, right 5/5   Knee flexion left 5/5, right 5/5  --NEUROLOGIC: Sensation to lower extremities is intact bilaterally in L2-S1 dermatomes.  Negative Mancini's bilaterally. Reflexes intact in BLE, no clonus.  --VASCULAR: Warm upper limbs bilaterally. Capillary refill in the upper extremities is less than 1 second.    RESULTS: Available medical records from [facility] and any other " outside records were reviewed today.     Imaging:  Available relevant imaging was personally reviewed and interpreted today. The images were shown to the patient and the findings were explained using a spine model.    MRI Lumbar spine w/o contrast    Result Date: 2/27/2024  EXAM: MR LUMBAR SPINE W/O CONTRAST LOCATION: [facility] DATE: 2/26/2024 INDICATION: 2 months of worsening left low back pain with left sided sciatica, history of L5 microdiscectomy in 2000, no neurologic red flags, not improving with conservative therapy COMPARISON: None. TECHNIQUE: Routine Lumbar Spine MRI without IV contrast. FINDINGS: Alignment is significant for multilevel subtle grade I spondylolisthesis and mild levoconvex curvature of the lumbar spine. Bone marrow demonstrates scattered mixed degenerative endplate changes including mild edema (Modic I) from the L4-L5 disc joint and mild fatty marrow change (Modic II) surrounding the L5-S1 disc joint. Conus medullaris is unremarkable terminating at the level of the mid L1 vertebral body. Cauda equina is unremarkable. Bilateral sacroiliac joint degenerative change. No convincing extraspinal abnormality. L1-L2: Disc height maintained. No herniation. Mild bilateral facet arthropathy. No foraminal or spinal canal stenosis. L2-L3: Disc height maintained. Minimal bulge with shallow foraminal components. Mild bilateral facet arthropathy. No right foraminal stenosis. Mild, if any, left foraminal stenosis. Mild, if any, spinal canal stenosis. L3-L4: Mild disc height loss. Disc bulge with bilateral foraminal components. Mild to moderate bilateral facet arthropathy. Mild to moderate right foraminal stenosis. Mild, if any, left foraminal stenosis. Mild, if any, spinal canal stenosis. L4-L5: Moderate disc height loss. Central disc extrusion extending inferiorly along the medial margins of the bilateral traversing L5 nerve roots within the lateral recesses. Mild to moderate  bilateral facet arthropathy. Moderate to severe right foraminal stenosis. Moderate left foraminal stenosis. Mild to moderate spinal canal stenosis related to the extrusion. L5-S1: Prior right hemilaminectomy. Severe disc height loss. Disc bulge with annular fissuring and right central extrusion component along the medial margin of the traversing right S1 nerve root within the lateral recess (series 5 image 7, 8). Mild bilateral facet arthropathy. Moderate bilateral foraminal stenosis. No spinal canal stenosis. Asymmetric effacement of the right lateral recess fat which could represent postoperative scar tissue (series 7 image 55).     IMPRESSION: 1.  Right laminectomy at L5-S1. 2.  Degenerative change as detailed.               Again, thank you for allowing me to participate in the care of your patient.        Sincerely,        Anthony Diaz MD

## 2024-03-01 NOTE — PROGRESS NOTES
ASSESSMENT:  Markell Garcia is a 58 year old male presents for consultation at the request of Bala Villasenor who presents today for new patient evaluation of :         Diagnoses and all orders for this visit:  Lumbar radiculopathy  -     PAIN Transforaminal JUS Inj Lumbosacral Left; Future  Chronic left-sided low back pain with left-sided sciatica  -     Spine  Referral  -     PAIN Transforaminal JUS Inj Lumbosacral Left; Future  Lumbar degenerative disc disease  Lumbar spondylosis       Patient is neurologically intact on exam. No myelopathic or red flag symptoms.    Patient is a 58-year-old male with history of right-sided low back pain and lumbar radiculopathy status post right hemilaminectomy who presents with left-sided low back pain and lumbosacral radiculopathy approximating an L5 distribution.  He is neurologically intact, and we reviewed his MRI which shows a disc extrusion at the L4-L5 level impinging the traversing L5 nerve root, and L5-S1 foraminal stenosis.  In light of this, and the fact that the patient has tried oral steroids and PT without significant improvement, we discussed doing epidural steroid injections to target both levels with an aim to address the L5 radiculopathy.  Patient is in agreement with this plan, all questions addressed.    PLAN:  Reviewed spine anatomy and disease process. Discussed diagnosis and treatment options with the patient today. A shared decision making model was used. The patient's values and choices were respected. The following represents what was discussed and decided upon by the provider and the patient.    1. DIAGNOSTIC TESTS  No new imaging orders at this time.    2. PHYSICAL THERAPY  Patient completed physical therapy from December 2023 through February 2024  Patient is already performing a home program, and was encouraged to continue doing so.  Discussed the importance of core strengthening, ROM, stretching exercises with the patient and how each  of these entities is important in decreasing pain.  Explained to the patient that the purpose of physical therapy is to teach the patient a home exercise program.  These exercises need to be performed every day in order to decrease pain and prevent future occurrences of pain.  Likened it to brushing one's teeth.      3. MEDICATIONS:  Discussed multiple medication options today with patient. Discussed risks, side effects, and proper use of medications. Patient verbalized understanding.  No new medications ordered at this visit.  Patient advised to call our clinic's nurse navigation line (number provided) if they experience any side effects or intolerances with prescribed medication.    4. INTERVENTIONS:  Left L4-L5 and L5-S1 Transforaminal Epidural Steroid Injection  Benefits of epidural steroid injection were reviewed including potential improvements in pain, function, and sleep. Potential risks also reviewed, including but not limited to bleeding, bruising, infection, increased or non-improvement of pain, injury to local structures, spinal infection or bleeding, nerve or spinal cord injury which could be temporary or permanent. Risks of steroid use can include increased blood sugar or blood pressure, hormonal disruption, increased fracture risk. After reviewing the risks, benefits, and alternatives, the patient was given an opportunity to ask questions. All questions were addressed, and the patient wishes to move forward with the selected injection.  Patient would be a good candidate for spinal cord stimulation if JUS is of limited benefit and patient is deemed not to be a surgical candidate    5. OTHER REFERRALS:  No other referrals at this time.    6. FOLLOW-UP  In approximately 2-4 weeks to assess response to injection.  Patient advised to contact our office for earlier appointment if symptoms worsening or not improving, or any side effects are noticed.    Advised patient to call the [facility] if symptoms  worsen or you have problems controlling bladder and bowel function.   ______________________________________________________________________    SUBJECTIVE:   Markell Garcia  is a 58 year old male who presents today for new patient evaluation.    Patient reports since early to mid December 2023 he has had progressively worsening left-sided low back pain which travels down the back of his left leg before wrapping around to the lateral calf.  He has a history of similar symptoms on the right side which presented far more intensely and he ultimately underwent right L5-S1 hemilaminectomy 25 years ago.  In comparison to that, the pain is sharp, is more dull but is in the back and the leg both.  He denies any jorge numbness or weakness of the leg, no bladder or bowel incontinence.  Reports that due to the pain, he is limited in length of walking and has difficulty getting out of a car.    Previously completed physical therapy from December 2023 to February 2024.  Feels like it gave some initial benefit, but has not had any long-term improvement of symptoms.  He also reports doing a short course of prednisone prescribed by his primary care, which again was of some but minimal benefit.    -Treatment to Date: As above      Oswestry (ALEE) Questionnaire        2/23/2024     3:31 PM   OSWESTRY DISABILITY INDEX   Count 10   Sum 16   Oswestry Score (%) 32 %       Neck Disability Index:      2/23/2024     3:34 PM   Neck Disability Index (  Darian H. and Eugenie HASKINS. 1991. All rights reserved.; used with permission)   SECTION 1 - PAIN INTENSITY 0   SECTION 2 - PERSONAL CARE 0   SECTION 3 - LIFTING 0   SECTION 4 - READING 0   SECTION 5 - HEADACHES 1   SECTION 6 - CONCENTRATION 1   SECTION 7 - WORK 3   SECTION 8 - DRIVING 0   SECTION 9 - SLEEPING 1   SECTION 10 - RECREATION 0   Count 10   Sum 6   Raw Score: /50 6   Neck Disability Index Score: (%) 12 %              -Medications:    Current Outpatient Medications   Medication     Ascorbic Acid (VITAMIN C PO)    Cholecalciferol (VITAMIN D3 PO)    Coenzyme Q10 (COQ10 PO)    fluocinonide (LIDEX) 0.05 % external solution    FOLIC ACID PO    Glucosamine HCl (GLUCOSAMINE PO)    Multiple Vitamins-Minerals (ZINC PO)    Omega-3 Fatty Acids (FISH OIL PO)    rosuvastatin (CRESTOR) 10 MG tablet    sildenafil (VIAGRA) 100 MG tablet    predniSONE (DELTASONE) 20 MG tablet     No current facility-administered medications for this visit.       No Known Allergies    Past Medical History:   Diagnosis Date    ED (erectile dysfunction)     Gastroesophageal reflux disease without esophagitis     Hammertoe of right foot     Hypercholesteremia     Neuropathy         Patient Active Problem List   Diagnosis    Neuropathy    Hypercholesteremia    Hammertoe of right foot    ED (erectile dysfunction)    Gastroesophageal reflux disease without esophagitis    Acute midline low back pain with left-sided sciatica       Past Surgical History:   Procedure Laterality Date    DISCECTOMY LUMBAR MINIMALLY INVASIVE ONE LEVEL  2000    ROTATOR CUFF REPAIR RT/LT Left     ROTATOR CUFF REPAIR RT/LT Right        Family History   Problem Relation Age of Onset    Hammer toes Mother     Seizure Disorder Mother     Hyperlipidemia Father     Insulin Resistance Father     Hyperlipidemia Paternal Grandfather     Diabetes Type 2  Paternal Uncle     Peripheral Neuropathy Paternal Uncle     Coronary Artery Disease No family hx of     Cerebrovascular Disease No family hx of     Prostate Cancer No family hx of     Colon Cancer No family hx of        Reviewed past medical, surgical, and family history with patient found on new patient intake packet located in EMR Media tab.     SOCIAL HX: Reviewed    ROS: Specifically negative for bowel/bladder dysfunction, balance changes, headache, dizziness, foot drop, fevers, chills, appetite changes, nausea/vomiting, unexplained weight loss. Otherwise 13 systems reviewed are negative. Please see the patient's  "intake questionnaire from today for details.    OBJECTIVE:  /70 (BP Location: Right arm, Patient Position: Sitting, Cuff Size: Adult Regular)   Pulse 67   Ht 6' 5\" (1.956 m)   Wt 240 lb (108.9 kg)   SpO2 99%   BMI 28.46 kg/m      PHYSICAL EXAMINATION:  --CONSTITUTIONAL: Vital signs as above. No acute distress. The patient is well nourished and well groomed.  --PSYCHIATRIC: The patient is awake, alert, oriented to person, place, time and answering questions appropriately with clear speech. Appropriate mood and affect   --RESPIRATORY: Normal rhythm and effort. No abnormal accessory muscle breathing patterns noted.   --GROSS MOTOR: Easily arises from a seated position.  --LUMBAR SPINE: Inspection reveals no evidence of deformity. Range of motion is not limited in flexion, extension, lateral rotation. Mild tenderness to palpation of lumbar paraspinals, no tenderness in spinous processes.  Facet loading (Mane test) negative, seated SLR negative bilaterally  --HIPS: Full range of motion bilaterally. Negative ROBERT test.  --LOWER EXTREMITY MOTOR TESTING:  Hip flexion left 5/5, right 5/5  Knee extension left 5/5, right 5/5  Ankle dorsiflexors left 5/5, right 5/5  Ankle plantarflexors left 5/5, right 5/5   Great toe extension left 5/5, right 5/5   Knee flexion left 5/5, right 5/5  --NEUROLOGIC: Sensation to lower extremities is intact bilaterally in L2-S1 dermatomes.  Negative Mancini's bilaterally. Reflexes intact in BLE, no clonus.  --VASCULAR: Warm upper limbs bilaterally. Capillary refill in the upper extremities is less than 1 second.    RESULTS: Available medical records from [facility] and any other outside records were reviewed today.     Imaging:  Available relevant imaging was personally reviewed and interpreted today. The images were shown to the patient and the findings were explained using a spine model.    MRI Lumbar spine w/o contrast    Result Date: 2/27/2024  EXAM: MR LUMBAR SPINE W/O " CONTRAST LOCATION: [facility] DATE: 2/26/2024 INDICATION: 2 months of worsening left low back pain with left sided sciatica, history of L5 microdiscectomy in 2000, no neurologic red flags, not improving with conservative therapy COMPARISON: None. TECHNIQUE: Routine Lumbar Spine MRI without IV contrast. FINDINGS: Alignment is significant for multilevel subtle grade I spondylolisthesis and mild levoconvex curvature of the lumbar spine. Bone marrow demonstrates scattered mixed degenerative endplate changes including mild edema (Modic I) from the L4-L5 disc joint and mild fatty marrow change (Modic II) surrounding the L5-S1 disc joint. Conus medullaris is unremarkable terminating at the level of the mid L1 vertebral body. Cauda equina is unremarkable. Bilateral sacroiliac joint degenerative change. No convincing extraspinal abnormality. L1-L2: Disc height maintained. No herniation. Mild bilateral facet arthropathy. No foraminal or spinal canal stenosis. L2-L3: Disc height maintained. Minimal bulge with shallow foraminal components. Mild bilateral facet arthropathy. No right foraminal stenosis. Mild, if any, left foraminal stenosis. Mild, if any, spinal canal stenosis. L3-L4: Mild disc height loss. Disc bulge with bilateral foraminal components. Mild to moderate bilateral facet arthropathy. Mild to moderate right foraminal stenosis. Mild, if any, left foraminal stenosis. Mild, if any, spinal canal stenosis. L4-L5: Moderate disc height loss. Central disc extrusion extending inferiorly along the medial margins of the bilateral traversing L5 nerve roots within the lateral recesses. Mild to moderate bilateral facet arthropathy. Moderate to severe right foraminal stenosis. Moderate left foraminal stenosis. Mild to moderate spinal canal stenosis related to the extrusion. L5-S1: Prior right hemilaminectomy. Severe disc height loss. Disc bulge with annular fissuring and right central extrusion  component along the medial margin of the traversing right S1 nerve root within the lateral recess (series 5 image 7, 8). Mild bilateral facet arthropathy. Moderate bilateral foraminal stenosis. No spinal canal stenosis. Asymmetric effacement of the right lateral recess fat which could represent postoperative scar tissue (series 7 image 55).     IMPRESSION: 1.  Right laminectomy at L5-S1. 2.  Degenerative change as detailed.

## 2024-03-13 ENCOUNTER — RADIOLOGY INJECTION OFFICE VISIT (OUTPATIENT)
Dept: PHYSICAL MEDICINE AND REHAB | Facility: CLINIC | Age: 59
End: 2024-03-13
Attending: STUDENT IN AN ORGANIZED HEALTH CARE EDUCATION/TRAINING PROGRAM
Payer: COMMERCIAL

## 2024-03-13 VITALS
TEMPERATURE: 98.1 F | HEART RATE: 66 BPM | DIASTOLIC BLOOD PRESSURE: 70 MMHG | SYSTOLIC BLOOD PRESSURE: 116 MMHG | RESPIRATION RATE: 16 BRPM | OXYGEN SATURATION: 96 %

## 2024-03-13 DIAGNOSIS — M54.42 CHRONIC LEFT-SIDED LOW BACK PAIN WITH LEFT-SIDED SCIATICA: ICD-10-CM

## 2024-03-13 DIAGNOSIS — G89.29 CHRONIC LEFT-SIDED LOW BACK PAIN WITH LEFT-SIDED SCIATICA: ICD-10-CM

## 2024-03-13 DIAGNOSIS — M54.16 LUMBAR RADICULOPATHY: ICD-10-CM

## 2024-03-13 PROCEDURE — 64483 NJX AA&/STRD TFRM EPI L/S 1: CPT | Mod: LT | Performed by: STUDENT IN AN ORGANIZED HEALTH CARE EDUCATION/TRAINING PROGRAM

## 2024-03-13 PROCEDURE — 64484 NJX AA&/STRD TFRM EPI L/S EA: CPT | Mod: LT | Performed by: STUDENT IN AN ORGANIZED HEALTH CARE EDUCATION/TRAINING PROGRAM

## 2024-03-13 RX ORDER — LIDOCAINE HYDROCHLORIDE 10 MG/ML
INJECTION, SOLUTION EPIDURAL; INFILTRATION; INTRACAUDAL; PERINEURAL
Status: COMPLETED | OUTPATIENT
Start: 2024-03-13 | End: 2024-03-13

## 2024-03-13 RX ORDER — BUPIVACAINE HYDROCHLORIDE 2.5 MG/ML
INJECTION, SOLUTION EPIDURAL; INFILTRATION; INTRACAUDAL
Status: COMPLETED | OUTPATIENT
Start: 2024-03-13 | End: 2024-03-13

## 2024-03-13 RX ORDER — DEXAMETHASONE SODIUM PHOSPHATE 10 MG/ML
INJECTION, SOLUTION INTRAMUSCULAR; INTRAVENOUS
Status: COMPLETED | OUTPATIENT
Start: 2024-03-13 | End: 2024-03-13

## 2024-03-13 RX ADMIN — DEXAMETHASONE SODIUM PHOSPHATE 10 MG: 10 INJECTION, SOLUTION INTRAMUSCULAR; INTRAVENOUS at 08:30

## 2024-03-13 RX ADMIN — LIDOCAINE HYDROCHLORIDE 2 ML: 10 INJECTION, SOLUTION EPIDURAL; INFILTRATION; INTRACAUDAL; PERINEURAL at 08:30

## 2024-03-13 RX ADMIN — BUPIVACAINE HYDROCHLORIDE 2 ML: 2.5 INJECTION, SOLUTION EPIDURAL; INFILTRATION; INTRACAUDAL at 08:30

## 2024-03-13 ASSESSMENT — PAIN SCALES - GENERAL
PAINLEVEL: NO PAIN (1)
PAINLEVEL: MILD PAIN (3)

## 2024-03-13 NOTE — PATIENT INSTRUCTIONS
DISCHARGE INSTRUCTIONS    During office hours (8:00 a.m.- 4:00 p.m.) questions or concerns may be answered  by calling [facility] Navigation Nurses at  [phone].  Messages received after hours will be returned the following business day.      In the case of an emergency, please dial 911 or seek assistance at the nearest Emergency Room/Urgent Care facility.     All Patients:    You may experience an increase in your symptoms for the first 2 days (It may take anywhere between 2 days- 2 weeks for the steroid to have maximum effect).    You may use ice on the injection site, as frequently as 20 minutes each hour if needed.    You may take your pain medicine.    You may continue taking your regular medication after your injection. If you have had a Medial Branch Block you may resume pain medication once your pain diary is completed.    You may shower. No swimming, tub bath or hot tub for 48 hours.  You may remove your bandaid/bandage as soon as you are home.    You may resume light activities, as tolerated.    Resume your usual diet as tolerated.    It is strongly advised that you do not drive for 1-3 hours post injection.    If you have had oral sedation:  Do not drive for 8 hours post injection.      If you have had IV sedation:  Do not drive for 24 hours post injection.  Do not operate hazardous machinery or make important personal/business decisions for 24 hours.      POSSIBLE STEROID SIDE EFFECTS (If steroid/cortisone was used for your procedure)    -If you experience these symptoms, it should only last for a short period    Swelling of the legs              Skin redness (flushing)     Mouth (oral) irritation   Blood sugar (glucose) levels            Sweats                    Mood changes  Headache  Sleeplessness  Weakened immune system for up to 14 days, which could increase the risk of aquilino the COVID-19 virus and/or experiencing more severe symptoms of the disease, if exposed.  Decreased  effectiveness of the flu vaccine if given within 2 weeks of the steroid.         POSSIBLE PROCEDURE SIDE EFFECTS  -Call the Spine Center if you are concerned  Increased Pain           Increased numbness/tingling      Nausea/Vomiting          Bruising/bleeding at site      Redness or swelling                                              Difficulty walking      Weakness           Fever greater than 100.5    *In the event of a severe headache after an epidural steroid injection that is relieved by lying down, please call the [facility] to speak with a clinical staff member*

## 2024-03-27 ENCOUNTER — OFFICE VISIT (OUTPATIENT)
Dept: PHYSICAL MEDICINE AND REHAB | Facility: CLINIC | Age: 59
End: 2024-03-27
Payer: COMMERCIAL

## 2024-03-27 VITALS
SYSTOLIC BLOOD PRESSURE: 132 MMHG | BODY MASS INDEX: 28.17 KG/M2 | DIASTOLIC BLOOD PRESSURE: 76 MMHG | HEIGHT: 77 IN | HEART RATE: 71 BPM | OXYGEN SATURATION: 98 % | WEIGHT: 238.6 LBS

## 2024-03-27 DIAGNOSIS — M51.369 LUMBAR DEGENERATIVE DISC DISEASE: ICD-10-CM

## 2024-03-27 DIAGNOSIS — M54.16 LUMBAR RADICULOPATHY: Primary | ICD-10-CM

## 2024-03-27 PROCEDURE — 99214 OFFICE O/P EST MOD 30 MIN: CPT | Performed by: STUDENT IN AN ORGANIZED HEALTH CARE EDUCATION/TRAINING PROGRAM

## 2024-03-27 ASSESSMENT — PAIN SCALES - GENERAL: PAINLEVEL: MILD PAIN (2)

## 2024-03-27 NOTE — PROGRESS NOTES
ASSESSMENT:  Markell Garcia is a 58 year old male presents for consultation at the request of Bala Villasenor who presents today for new patient evaluation of :         Diagnoses and all orders for this visit:  Lumbar radiculopathy  -     Adult Neurosurgery  Referral; Future  Lumbar degenerative disc disease  -     Adult Neurosurgery  Referral; Future       Patient is neurologically intact on exam. No myelopathic or red flag symptoms.    Patient presents for follow-up after left L4-L5 and L5-S1 TFESI performed on 3/13/2024.  He reports excellent relief for the first 1 or 2 days after the injection, after which the degree of relief has fallen off sharply to only about 15% total.  We discussed these results as well as his MRI which does show possible postoperative scar tissue at the L5-S1 level, and this may be part of why he had a limited response with JUS.  We could consider doing a second injection with an alternate approach to see if that gives additional relief, or could consider surgical consultation as he has had a right L5-S1 hemilaminectomy previously and this may represent adjacent segment disease on the left side now.  He may also be a candidate for spinal cord stimulation if he is not felt to be a good surgical candidate.  After discussing the options, patient would like to speak with the surgeon to see what his surgical options are.  Will place referral.    PLAN:  Reviewed spine anatomy and disease process. Discussed diagnosis and treatment options with the patient today. A shared decision making model was used. The patient's values and choices were respected. The following represents what was discussed and decided upon by the provider and the patient.    1. DIAGNOSTIC TESTS  No new imaging orders at this time.    2. PHYSICAL THERAPY  Patient completed physical therapy from December 2023 through February 2024  Patient is already performing a home program, and was encouraged to  continue doing so.  Discussed the importance of core strengthening, ROM, stretching exercises with the patient and how each of these entities is important in decreasing pain.  Explained to the patient that the purpose of physical therapy is to teach the patient a home exercise program.  These exercises need to be performed every day in order to decrease pain and prevent future occurrences of pain.  Likened it to brushing one's teeth.      3. MEDICATIONS:  Discussed multiple medication options today with patient. Discussed risks, side effects, and proper use of medications. Patient verbalized understanding.  No new medications ordered at this visit.    4. INTERVENTIONS:  Patient wishes to hold off on injections at this time.  Patient would be a good candidate for spinal cord stimulation if he is deemed not to be a surgical candidate    5. OTHER REFERRALS:  No other referrals at this time.    6. FOLLOW-UP  As needed.  Patient advised to contact our office for earlier appointment if symptoms worsening or not improving, or any side effects are noticed.    Advised patient to call the [facility] if symptoms worsen or you have problems controlling bladder and bowel function.   ______________________________________________________________________    SUBJECTIVE:   Markell Garcia  is a 58 year old male who presents today for follow-up evaluation.    Patient underwent left L4-5 and L5-S1 TFESI with our clinic on 3/13/2024.  He reports for the first 2 days after the injection he had significant improvement of his back and leg symptoms.  However, starting a few days after the injection he began walking more and noticed that the pain was coming back at its usual level prior to injection.  Overall he reports about 15% improvement with the injection, with 80+ percent improvement in the first day or 2.  No new numbness, weakness, or bladder bowel incontinence.    -Treatment to Date: As above    3/13/24 - Diaz - Left L4-L5 and  L5-S1 TFESI - 80%+ relief for 1-2 days, then 15% relief      Oswestry (ALEE) Questionnaire        2/23/2024     3:31 PM   OSWESTRY DISABILITY INDEX   Count 10   Sum 16   Oswestry Score (%) 32 %       Neck Disability Index:      2/23/2024     3:34 PM   Neck Disability Index (  Darian OTERO. and Eugenie ODOM 1991. All rights reserved.; used with permission)   SECTION 1 - PAIN INTENSITY 0   SECTION 2 - PERSONAL CARE 0   SECTION 3 - LIFTING 0   SECTION 4 - READING 0   SECTION 5 - HEADACHES 1   SECTION 6 - CONCENTRATION 1   SECTION 7 - WORK 3   SECTION 8 - DRIVING 0   SECTION 9 - SLEEPING 1   SECTION 10 - RECREATION 0   Count 10   Sum 6   Raw Score: /50 6   Neck Disability Index Score: (%) 12 %              -Medications:    Current Outpatient Medications   Medication    Ascorbic Acid (VITAMIN C PO)    Cholecalciferol (VITAMIN D3 PO)    Coenzyme Q10 (COQ10 PO)    fluocinonide (LIDEX) 0.05 % external solution    FOLIC ACID PO    Glucosamine HCl (GLUCOSAMINE PO)    Multiple Vitamins-Minerals (ZINC PO)    Omega-3 Fatty Acids (FISH OIL PO)    predniSONE (DELTASONE) 20 MG tablet    rosuvastatin (CRESTOR) 10 MG tablet    sildenafil (VIAGRA) 100 MG tablet    cyclobenzaprine (FLEXERIL) 5 MG tablet     No current facility-administered medications for this visit.       No Known Allergies    Past Medical History:   Diagnosis Date    ED (erectile dysfunction)     Gastroesophageal reflux disease without esophagitis     Hammertoe of right foot     Hypercholesteremia     Neuropathy         Patient Active Problem List   Diagnosis    Neuropathy    Hypercholesteremia    Hammertoe of right foot    ED (erectile dysfunction)    Gastroesophageal reflux disease without esophagitis    Acute midline low back pain with left-sided sciatica       Past Surgical History:   Procedure Laterality Date    DISCECTOMY LUMBAR MINIMALLY INVASIVE ONE LEVEL  2000    ROTATOR CUFF REPAIR RT/LT Left     ROTATOR CUFF REPAIR RT/LT Right        Family History   Problem  "Relation Age of Onset    Hammer toes Mother     Seizure Disorder Mother     Hyperlipidemia Father     Insulin Resistance Father     Hyperlipidemia Paternal Grandfather     Diabetes Type 2  Paternal Uncle     Peripheral Neuropathy Paternal Uncle     Coronary Artery Disease No family hx of     Cerebrovascular Disease No family hx of     Prostate Cancer No family hx of     Colon Cancer No family hx of        Reviewed past medical, surgical, and family history with patient found on new patient intake packet located in EMR Media tab.     SOCIAL HX: Reviewed    ROS: Specifically negative for bowel/bladder dysfunction, balance changes, headache, dizziness, foot drop, fevers, chills, appetite changes, nausea/vomiting, unexplained weight loss. Otherwise 13 systems reviewed are negative. Please see the patient's intake questionnaire from today for details.    OBJECTIVE:  /76 (BP Location: Left arm, Patient Position: Sitting, Cuff Size: Adult Regular)   Pulse 71   Ht 6' 5\" (1.956 m)   Wt 238 lb 9.6 oz (108.2 kg)   SpO2 98%   BMI 28.29 kg/m      PHYSICAL EXAMINATION:  --CONSTITUTIONAL: Vital signs as above. No acute distress. The patient is well nourished and well groomed.  --PSYCHIATRIC: The patient is awake, alert, oriented to person, place, time and answering questions appropriately with clear speech. Appropriate mood and affect   --RESPIRATORY: Normal rhythm and effort. No abnormal accessory muscle breathing patterns noted.   --GROSS MOTOR: Easily arises from a seated position.  --LUMBAR SPINE: Inspection reveals no evidence of deformity. Range of motion is not limited in flexion, extension, lateral rotation. Mild tenderness to palpation of lumbar paraspinals, no tenderness in spinous processes.  Facet loading (Mane test) negative, seated SLR negative bilaterally  --HIPS: Full range of motion bilaterally. Negative ROBERT test.  --LOWER EXTREMITY MOTOR TESTING:  Hip flexion left 5/5, right 5/5  Knee extension left " 5/5, right 5/5  Ankle dorsiflexors left 5/5, right 5/5  Ankle plantarflexors left 5/5, right 5/5   Great toe extension left 5/5, right 5/5   Knee flexion left 5/5, right 5/5  --NEUROLOGIC: Sensation to lower extremities is intact bilaterally in L2-S1 dermatomes.  Negative Mancini's bilaterally. Reflexes intact in BLE, no clonus.  --VASCULAR: Warm upper limbs bilaterally. Capillary refill in the upper extremities is less than 1 second.    RESULTS: Available medical records from [facility] and any other outside records were reviewed today.     Imaging:  Available relevant imaging was personally reviewed and interpreted today. The images were shown to the patient and the findings were explained using a spine model.    MRI Lumbar spine w/o contrast    Result Date: 2/27/2024  EXAM: MR LUMBAR SPINE W/O CONTRAST LOCATION: [facility] DATE: 2/26/2024 INDICATION: 2 months of worsening left low back pain with left sided sciatica, history of L5 microdiscectomy in 2000, no neurologic red flags, not improving with conservative therapy COMPARISON: None. TECHNIQUE: Routine Lumbar Spine MRI without IV contrast. FINDINGS: Alignment is significant for multilevel subtle grade I spondylolisthesis and mild levoconvex curvature of the lumbar spine. Bone marrow demonstrates scattered mixed degenerative endplate changes including mild edema (Modic I) from the L4-L5 disc joint and mild fatty marrow change (Modic II) surrounding the L5-S1 disc joint. Conus medullaris is unremarkable terminating at the level of the mid L1 vertebral body. Cauda equina is unremarkable. Bilateral sacroiliac joint degenerative change. No convincing extraspinal abnormality. L1-L2: Disc height maintained. No herniation. Mild bilateral facet arthropathy. No foraminal or spinal canal stenosis. L2-L3: Disc height maintained. Minimal bulge with shallow foraminal components. Mild bilateral facet arthropathy. No right foraminal stenosis.  Mild, if any, left foraminal stenosis. Mild, if any, spinal canal stenosis. L3-L4: Mild disc height loss. Disc bulge with bilateral foraminal components. Mild to moderate bilateral facet arthropathy. Mild to moderate right foraminal stenosis. Mild, if any, left foraminal stenosis. Mild, if any, spinal canal stenosis. L4-L5: Moderate disc height loss. Central disc extrusion extending inferiorly along the medial margins of the bilateral traversing L5 nerve roots within the lateral recesses. Mild to moderate bilateral facet arthropathy. Moderate to severe right foraminal stenosis. Moderate left foraminal stenosis. Mild to moderate spinal canal stenosis related to the extrusion. L5-S1: Prior right hemilaminectomy. Severe disc height loss. Disc bulge with annular fissuring and right central extrusion component along the medial margin of the traversing right S1 nerve root within the lateral recess (series 5 image 7, 8). Mild bilateral facet arthropathy. Moderate bilateral foraminal stenosis. No spinal canal stenosis. Asymmetric effacement of the right lateral recess fat which could represent postoperative scar tissue (series 7 image 55).     IMPRESSION: 1.  Right laminectomy at L5-S1. 2.  Degenerative change as detailed.

## 2024-03-27 NOTE — LETTER
3/27/2024         RE: Markell Garcia  [address] [address]        Dear Colleague,    Thank you for referring your patient, Markell Garcia, to the [facility]. Please see a copy of my visit note below.    ASSESSMENT:  Markell Garcia is a 58 year old male presents for consultation at the request of Bala Villasenor who presents today for new patient evaluation of :         Diagnoses and all orders for this visit:  Lumbar radiculopathy  -     Adult Neurosurgery  Referral; Future  Lumbar degenerative disc disease  -     Adult Neurosurgery  Referral; Future       Patient is neurologically intact on exam. No myelopathic or red flag symptoms.    Patient presents for follow-up after left L4-L5 and L5-S1 TFESI performed on 3/13/2024.  He reports excellent relief for the first 1 or 2 days after the injection, after which the degree of relief has fallen off sharply to only about 15% total.  We discussed these results as well as his MRI which does show possible postoperative scar tissue at the L5-S1 level, and this may be part of why he had a limited response with JUS.  We could consider doing a second injection with an alternate approach to see if that gives additional relief, or could consider surgical consultation as he has had a right L5-S1 hemilaminectomy previously and this may represent adjacent segment disease on the left side now.  He may also be a candidate for spinal cord stimulation if he is not felt to be a good surgical candidate.  After discussing the options, patient would like to speak with the surgeon to see what his surgical options are.  Will place referral.    PLAN:  Reviewed spine anatomy and disease process. Discussed diagnosis and treatment options with the patient today. A shared decision making model was used. The patient's values and choices were respected. The following represents what was discussed and decided upon by the  provider and the patient.    1. DIAGNOSTIC TESTS  No new imaging orders at this time.    2. PHYSICAL THERAPY  Patient completed physical therapy from December 2023 through February 2024  Patient is already performing a home program, and was encouraged to continue doing so.  Discussed the importance of core strengthening, ROM, stretching exercises with the patient and how each of these entities is important in decreasing pain.  Explained to the patient that the purpose of physical therapy is to teach the patient a home exercise program.  These exercises need to be performed every day in order to decrease pain and prevent future occurrences of pain.  Likened it to brushing one's teeth.      3. MEDICATIONS:  Discussed multiple medication options today with patient. Discussed risks, side effects, and proper use of medications. Patient verbalized understanding.  No new medications ordered at this visit.    4. INTERVENTIONS:  Patient wishes to hold off on injections at this time.  Patient would be a good candidate for spinal cord stimulation if he is deemed not to be a surgical candidate    5. OTHER REFERRALS:  No other referrals at this time.    6. FOLLOW-UP  As needed.  Patient advised to contact our office for earlier appointment if symptoms worsening or not improving, or any side effects are noticed.    Advised patient to call the [facility] if symptoms worsen or you have problems controlling bladder and bowel function.   ______________________________________________________________________    SUBJECTIVE:   Markell Garcia  is a 58 year old male who presents today for follow-up evaluation.    Patient underwent left L4-5 and L5-S1 TFESI with our clinic on 3/13/2024.  He reports for the first 2 days after the injection he had significant improvement of his back and leg symptoms.  However, starting a few days after the injection he began walking more and noticed that the pain was coming back at its usual level prior  to injection.  Overall he reports about 15% improvement with the injection, with 80+ percent improvement in the first day or 2.  No new numbness, weakness, or bladder bowel incontinence.    -Treatment to Date: As above    3/13/24 - Diaz - Left L4-L5 and L5-S1 TFESI - 80%+ relief for 1-2 days, then 15% relief      Oswestry (ALEE) Questionnaire        2/23/2024     3:31 PM   OSWESTRY DISABILITY INDEX   Count 10   Sum 16   Oswestry Score (%) 32 %       Neck Disability Index:      2/23/2024     3:34 PM   Neck Disability Index (  Darian OTERO. and Eugenie HASKINS. 1991. All rights reserved.; used with permission)   SECTION 1 - PAIN INTENSITY 0   SECTION 2 - PERSONAL CARE 0   SECTION 3 - LIFTING 0   SECTION 4 - READING 0   SECTION 5 - HEADACHES 1   SECTION 6 - CONCENTRATION 1   SECTION 7 - WORK 3   SECTION 8 - DRIVING 0   SECTION 9 - SLEEPING 1   SECTION 10 - RECREATION 0   Count 10   Sum 6   Raw Score: /50 6   Neck Disability Index Score: (%) 12 %              -Medications:    Current Outpatient Medications   Medication     Ascorbic Acid (VITAMIN C PO)     Cholecalciferol (VITAMIN D3 PO)     Coenzyme Q10 (COQ10 PO)     fluocinonide (LIDEX) 0.05 % external solution     FOLIC ACID PO     Glucosamine HCl (GLUCOSAMINE PO)     Multiple Vitamins-Minerals (ZINC PO)     Omega-3 Fatty Acids (FISH OIL PO)     predniSONE (DELTASONE) 20 MG tablet     rosuvastatin (CRESTOR) 10 MG tablet     sildenafil (VIAGRA) 100 MG tablet     cyclobenzaprine (FLEXERIL) 5 MG tablet     No current facility-administered medications for this visit.       No Known Allergies    Past Medical History:   Diagnosis Date     ED (erectile dysfunction)      Gastroesophageal reflux disease without esophagitis      Hammertoe of right foot      Hypercholesteremia      Neuropathy         Patient Active Problem List   Diagnosis     Neuropathy     Hypercholesteremia     Hammertoe of right foot     ED (erectile dysfunction)     Gastroesophageal reflux disease without esophagitis  "    Acute midline low back pain with left-sided sciatica       Past Surgical History:   Procedure Laterality Date     DISCECTOMY LUMBAR MINIMALLY INVASIVE ONE LEVEL  2000     ROTATOR CUFF REPAIR RT/LT Left      ROTATOR CUFF REPAIR RT/LT Right        Family History   Problem Relation Age of Onset     Hammer toes Mother      Seizure Disorder Mother      Hyperlipidemia Father      Insulin Resistance Father      Hyperlipidemia Paternal Grandfather      Diabetes Type 2  Paternal Uncle      Peripheral Neuropathy Paternal Uncle      Coronary Artery Disease No family hx of      Cerebrovascular Disease No family hx of      Prostate Cancer No family hx of      Colon Cancer No family hx of        Reviewed past medical, surgical, and family history with patient found on new patient intake packet located in EMR Media tab.     SOCIAL HX: Reviewed    ROS: Specifically negative for bowel/bladder dysfunction, balance changes, headache, dizziness, foot drop, fevers, chills, appetite changes, nausea/vomiting, unexplained weight loss. Otherwise 13 systems reviewed are negative. Please see the patient's intake questionnaire from today for details.    OBJECTIVE:  /76 (BP Location: Left arm, Patient Position: Sitting, Cuff Size: Adult Regular)   Pulse 71   Ht 6' 5\" (1.956 m)   Wt 238 lb 9.6 oz (108.2 kg)   SpO2 98%   BMI 28.29 kg/m      PHYSICAL EXAMINATION:  --CONSTITUTIONAL: Vital signs as above. No acute distress. The patient is well nourished and well groomed.  --PSYCHIATRIC: The patient is awake, alert, oriented to person, place, time and answering questions appropriately with clear speech. Appropriate mood and affect   --RESPIRATORY: Normal rhythm and effort. No abnormal accessory muscle breathing patterns noted.   --GROSS MOTOR: Easily arises from a seated position.  --LUMBAR SPINE: Inspection reveals no evidence of deformity. Range of motion is not limited in flexion, extension, lateral rotation. Mild tenderness to " palpation of lumbar paraspinals, no tenderness in spinous processes.  Facet loading (Mane test) negative, seated SLR negative bilaterally  --HIPS: Full range of motion bilaterally. Negative ROBERT test.  --LOWER EXTREMITY MOTOR TESTING:  Hip flexion left 5/5, right 5/5  Knee extension left 5/5, right 5/5  Ankle dorsiflexors left 5/5, right 5/5  Ankle plantarflexors left 5/5, right 5/5   Great toe extension left 5/5, right 5/5   Knee flexion left 5/5, right 5/5  --NEUROLOGIC: Sensation to lower extremities is intact bilaterally in L2-S1 dermatomes.  Negative Mancini's bilaterally. Reflexes intact in BLE, no clonus.  --VASCULAR: Warm upper limbs bilaterally. Capillary refill in the upper extremities is less than 1 second.    RESULTS: Available medical records from [facility] and any other outside records were reviewed today.     Imaging:  Available relevant imaging was personally reviewed and interpreted today. The images were shown to the patient and the findings were explained using a spine model.    MRI Lumbar spine w/o contrast    Result Date: 2/27/2024  EXAM: MR LUMBAR SPINE W/O CONTRAST LOCATION: [facility] DATE: 2/26/2024 INDICATION: 2 months of worsening left low back pain with left sided sciatica, history of L5 microdiscectomy in 2000, no neurologic red flags, not improving with conservative therapy COMPARISON: None. TECHNIQUE: Routine Lumbar Spine MRI without IV contrast. FINDINGS: Alignment is significant for multilevel subtle grade I spondylolisthesis and mild levoconvex curvature of the lumbar spine. Bone marrow demonstrates scattered mixed degenerative endplate changes including mild edema (Modic I) from the L4-L5 disc joint and mild fatty marrow change (Modic II) surrounding the L5-S1 disc joint. Conus medullaris is unremarkable terminating at the level of the mid L1 vertebral body. Cauda equina is unremarkable. Bilateral sacroiliac joint degenerative change. No  convincing extraspinal abnormality. L1-L2: Disc height maintained. No herniation. Mild bilateral facet arthropathy. No foraminal or spinal canal stenosis. L2-L3: Disc height maintained. Minimal bulge with shallow foraminal components. Mild bilateral facet arthropathy. No right foraminal stenosis. Mild, if any, left foraminal stenosis. Mild, if any, spinal canal stenosis. L3-L4: Mild disc height loss. Disc bulge with bilateral foraminal components. Mild to moderate bilateral facet arthropathy. Mild to moderate right foraminal stenosis. Mild, if any, left foraminal stenosis. Mild, if any, spinal canal stenosis. L4-L5: Moderate disc height loss. Central disc extrusion extending inferiorly along the medial margins of the bilateral traversing L5 nerve roots within the lateral recesses. Mild to moderate bilateral facet arthropathy. Moderate to severe right foraminal stenosis. Moderate left foraminal stenosis. Mild to moderate spinal canal stenosis related to the extrusion. L5-S1: Prior right hemilaminectomy. Severe disc height loss. Disc bulge with annular fissuring and right central extrusion component along the medial margin of the traversing right S1 nerve root within the lateral recess (series 5 image 7, 8). Mild bilateral facet arthropathy. Moderate bilateral foraminal stenosis. No spinal canal stenosis. Asymmetric effacement of the right lateral recess fat which could represent postoperative scar tissue (series 7 image 55).     IMPRESSION: 1.  Right laminectomy at L5-S1. 2.  Degenerative change as detailed.               Again, thank you for allowing me to participate in the care of your patient.        Sincerely,        Anthony Diaz MD

## 2024-03-28 ENCOUNTER — TELEPHONE (OUTPATIENT)
Dept: NEUROSURGERY | Facility: CLINIC | Age: 59
End: 2024-03-28
Payer: COMMERCIAL

## 2024-03-28 DIAGNOSIS — M54.9 BACK PAIN: Primary | ICD-10-CM

## 2024-03-28 NOTE — CONFIDENTIAL NOTE
Neurosurgery  SPINE PATIENTS - NEW PROTOCOL PREVISIT    RECORDS RECEIVED FROM: Referred by Anthony Diaz MD    REASON FOR VISIT:   M54.16 (ICD-10-CM) - Lumbar radiculopathy   M51.36 (ICD-10-CM) - Lumbar degenerative disc disease      Date of Appt: 4/8/24   NOTES (FOR ALL VISITS) STATUS DETAILS   OFFICE NOTE from referring provider Internal 3/27/24   OFFICE NOTE from other specialist Internal  Internal 2/16/24 Bala Villasenor MD     PT 2024   DISCHARGE SUMMARY from hospital N/A    DISCHARGE REPORT from ER N/A    OPERATIVE REPORT N/A    EMG REPORT Internal 6/23/22 in procedures    MEDICATION LIST Internal    IMAGING  (FOR ALL VISITS)     MRI (HEAD, NECK, SPINE) Internal Lumbar 2/26/24   XRAY (SPINE) *NEUROSURGERY*     CT (HEAD, NECK, SPINE)          LUMBAR EPIDURAL STEROID INJECTION TRANSFORAMINAL APPROACH WITH FLUOROSCOPIC GUIDANCE  Performed on: 3/13/24

## 2024-03-28 NOTE — TELEPHONE ENCOUNTER
A message was left for patient to call and schedule imaging prior to new patient visit with Dr. Campos.      Referral for imaging is in patient chart- patient was advised that imaging will done at a separate location from the [facility], and will need travel time from imaging center to clinic if doing images on same day.  Patient is scheduled with Dr. Campos on 4-8-24 at 12:20p.

## 2024-04-08 ENCOUNTER — HOSPITAL ENCOUNTER (OUTPATIENT)
Dept: GENERAL RADIOLOGY | Facility: HOSPITAL | Age: 59
Discharge: HOME | End: 2024-04-08
Attending: NEUROLOGICAL SURGERY
Payer: COMMERCIAL

## 2024-04-08 ENCOUNTER — PRE VISIT (OUTPATIENT)
Dept: NEUROSURGERY | Facility: CLINIC | Age: 59
End: 2024-04-08

## 2024-04-08 ENCOUNTER — HOSPITAL ENCOUNTER (OUTPATIENT)
Dept: RADIOLOGY | Facility: HOSPITAL | Age: 59
Discharge: HOME | End: 2024-04-08
Attending: NEUROLOGICAL SURGERY
Payer: COMMERCIAL

## 2024-04-08 ENCOUNTER — OFFICE VISIT (OUTPATIENT)
Dept: NEUROSURGERY | Facility: CLINIC | Age: 59
End: 2024-04-08
Attending: STUDENT IN AN ORGANIZED HEALTH CARE EDUCATION/TRAINING PROGRAM
Payer: COMMERCIAL

## 2024-04-08 VITALS
BODY MASS INDEX: 28.2 KG/M2 | HEART RATE: 67 BPM | HEIGHT: 77 IN | OXYGEN SATURATION: 95 % | WEIGHT: 238.8 LBS | SYSTOLIC BLOOD PRESSURE: 139 MMHG | DIASTOLIC BLOOD PRESSURE: 88 MMHG

## 2024-04-08 DIAGNOSIS — M54.16 LUMBAR RADICULOPATHY: ICD-10-CM

## 2024-04-08 DIAGNOSIS — M51.369 LUMBAR DEGENERATIVE DISC DISEASE: ICD-10-CM

## 2024-04-08 DIAGNOSIS — M54.9 BACK PAIN: ICD-10-CM

## 2024-04-08 PROCEDURE — 99205 OFFICE O/P NEW HI 60 MIN: CPT | Performed by: NEUROLOGICAL SURGERY

## 2024-04-08 PROCEDURE — 72110 X-RAY EXAM L-2 SPINE 4/>VWS: CPT

## 2024-04-08 PROCEDURE — 73562 X-RAY EXAM OF KNEE 3: CPT | Mod: LT

## 2024-04-08 ASSESSMENT — PAIN SCALES - GENERAL: PAINLEVEL: MILD PAIN (2)

## 2024-04-08 NOTE — PROGRESS NOTES
NEUROSURGERY CONSULTATION NOTE  Neurosurgery was asked to see this patient by Anthony Diaz MD for evaluation of left leg and left knee pain.       CONSULTATION ASSESSMENT AND PLAN:    Mr. Garcia is a 58-year-old right-handed gentleman with significant past medical history of GERD, hypercholesterolemia, peripheral neuropathy, right leg pain s/p right L5-S1 microdiscectomy 20 years ago presented to the clinic today for evaluation of left leg pain that started in December 2023 without preceding trauma.  Patient describes that the epicenter of the pain is along the posterior aspect of his left knee and radiating superiorly and inferiorly.  Patient also describes that his left leg pain is different from what he had in his right leg 20 years ago for which he underwent microdiscectomy.    He has positive Westley's on the left and severe joint line tenderness involving his left knee joint.  He has no other focal neurological deficit on examination.    I explained the MRI lumbar spine findings with the patient and showed him the images.  I explained to him that he has degenerative disc disease with HNP at multiple levels primarily at L4-5 and L5-S1 with severe collapse of disc space at L5-S1.  I discussed the natural history of degenerative disc disease and HNP.  I also discussed the management options including conservative treatment with physical therapy and nonsurgical interventions, decompression alone and decompression with fusion.    However prior to finalizing management plan, would like to investigate his left knee being the pain generator with left knee x-rays AP and lateral views and will also refer him to the orthopedic surgeon regarding the same.  I will also get left lower extremity EMG/NCV studies.  I will follow him with above-mentioned tests in 6 weeks or so.  He can continue physical therapy in the interim.    Patient agreed with the plan.  All the questions were answered and patient sounded  understanding.  He can contact us if there are any further questions or concerns or worsening neurological deficits.I spent more than 60 minutes in this apt, examining the pt, reviewing the scans, reviewing notes from chart, discussing treatment options with risks and benefits and coordinating care. This note was created in part by the use of Dragon voice recognition system. Inadvertent grammatical errors and typographical errors may have occurred due to inherent limitation of voice recognition software.  Reasonable attempts made to avoid errors, but this document may contain an error not identified before finalizing.  Please contact me for any clarification needed.     Gerard Campos MD      HPI:    Mr. Garcia is a 58-year-old right-handed gentleman with significant past medical history of GERD, hypercholesterolemia, peripheral neuropathy, right leg pain s/p right L5-S1 microdiscectomy 20 years ago presented to the clinic today for evaluation of left leg pain that started in December 2023 without preceding trauma.    Patient was doing well till few months ago when he started noticing left leg pain which is deep aching in nature 8 x 10 on VAS aggravated with walking relieved with sitting down.  Patient reports that his left leg pain is different from what he had 20 years ago involving his right leg.  Patient denied pain radiating from his low back to his leg.  Patient describes that the epicenter of the pain is his left knee along the posterior aspect and radiating superiorly and inferiorly.  Patient describes that his pain is severe enough that is affecting his walking and day-to-day activities.  Patient has done physical therapy and left L4-5 and L5-S1 transforaminal epidural steroid injection on 3/13/2024 with some relief for 1 to 2 days with return of pain.  Patient also reports that he was not doing activities mediately after the injection.    Patient denies any weakness involving his left lower extremity or  bladder or bowel symptoms.    Of note patient had radicular pain involving his right lower extremity after running and was in excruciating pain which immediately relieved after the microdiscectomy 20 years ago with one of the orthopedic surgeons in [place].  Patient does not remember the name of the hospital or the surgeon.    Patient does not smoke, denies use of recreational drugs and occasionally consumes alcohol.  Patient denies any other significant cardiac or pulmonary history.  He is not on any antiplatelets or anticoagulants.        Past Medical History:   Diagnosis Date    ED (erectile dysfunction)     Gastroesophageal reflux disease without esophagitis     Hammertoe of right foot     Hypercholesteremia     Neuropathy        Past Surgical History:   Procedure Laterality Date    DISCECTOMY LUMBAR MINIMALLY INVASIVE ONE LEVEL  2000    ROTATOR CUFF REPAIR RT/LT Left     ROTATOR CUFF REPAIR RT/LT Right        REVIEW OF SYSTEMS:  Review Of Systems  Skin: negative  Eyes: negative  Ears/Nose/Throat: negative  Respiratory: No shortness of breath, dyspnea on exertion, cough, or hemoptysis  Cardiovascular: negative  Gastrointestinal: negative  Genitourinary: negative  Musculoskeletal: Left leg and knee pain  Neurologic: negative  Psychiatric: negative  Hematologic/Lymphatic/Immunologic: negative  Endocrine: negative    MEDICATIONS:    Current Outpatient Medications   Medication Sig Dispense Refill    Ascorbic Acid (VITAMIN C PO)       Cholecalciferol (VITAMIN D3 PO) Take by mouth daily      Coenzyme Q10 (COQ10 PO)       cyclobenzaprine (FLEXERIL) 5 MG tablet Take 1-2 tablets (5-10 mg) by mouth 3 times daily as needed for muscle spasms (Patient not taking: Reported on 3/27/2024) 30 tablet 0    fluocinonide (LIDEX) 0.05 % external solution Apply topically 2 times daily as needed (chest rash) 60 mL 0    FOLIC ACID PO Take 1 mg by mouth daily      Glucosamine HCl (GLUCOSAMINE PO)       Multiple Vitamins-Minerals  (ZINC PO) Zinc      Omega-3 Fatty Acids (FISH OIL PO)       predniSONE (DELTASONE) 20 MG tablet Take 2 tablets (40 mg) by mouth daily 10 tablet 0    rosuvastatin (CRESTOR) 10 MG tablet Take 1 tablet (10 mg) by mouth daily 90 tablet 3    sildenafil (VIAGRA) 100 MG tablet Take 1 tablet (100 mg) by mouth daily as needed (30-60 minutes before intercourse for ED) 30 tablet 11         ALLERGIES/SENSITIVITIES:     No Known Allergies    PERTINENT SOCIAL HISTORY: Non-smoker  Social History     Socioeconomic History    Marital status:      Spouse name: None    Number of children: None    Years of education: None    Highest education level: None   Tobacco Use    Smoking status: Never    Smokeless tobacco: Never   Vaping Use    Vaping Use: Never used   Substance and Sexual Activity    Alcohol use: Yes     Comment: Once a week, 2-3 beers at a time    Drug use: Never    Sexual activity: Yes     Partners: Female     Birth control/protection: Post-menopausal     Comment: exclusive with partner   Social History Narrative    Moved to [place] 2022 from [place]    Lives with wife    Works in supply chain distribution, works remotely and travels frequently    One brother in [place], one in [place], [place]     Daughter is local     Social Determinants of Health     Financial Resource Strain: Low Risk  (9/24/2023)    Financial Resource Strain     Within the past 12 months, have you or your family members you live with been unable to get utilities (heat, electricity) when it was really needed?: No   Food Insecurity: Low Risk  (9/24/2023)    Food Insecurity     Within the past 12 months, did you worry that your food would run out before you got money to buy more?: No     Within the past 12 months, did the food you bought just not last and you didn t have money to get more?: No   Transportation Needs: Low Risk  (9/24/2023)    Transportation Needs     Within the past 12 months, has lack of transportation kept you from medical  "appointments, getting your medicines, non-medical meetings or appointments, work, or from getting things that you need?: No   Interpersonal Safety: Low Risk  (12/27/2023)    Interpersonal Safety     Do you feel physically and emotionally safe where you currently live?: Yes     Within the past 12 months, have you been hit, slapped, kicked or otherwise physically hurt by someone?: No     Within the past 12 months, have you been humiliated or emotionally abused in other ways by your partner or ex-partner?: No   Housing Stability: Low Risk  (9/24/2023)    Housing Stability     Do you have housing? : Yes     Are you worried about losing your housing?: No         FAMILY HISTORY:  Family History   Problem Relation Age of Onset    Hammer toes Mother     Seizure Disorder Mother     Hyperlipidemia Father     Insulin Resistance Father     Hyperlipidemia Paternal Grandfather     Diabetes Type 2  Paternal Uncle     Peripheral Neuropathy Paternal Uncle     Coronary Artery Disease No family hx of     Cerebrovascular Disease No family hx of     Prostate Cancer No family hx of     Colon Cancer No family hx of         PHYSICAL EXAM:   Constitutional: /88   Pulse 67   Ht 6' 5\" (1.956 m)   Wt 238 lb 12.8 oz (108.3 kg)   SpO2 95%   BMI 28.32 kg/m       Mental Status: A & O in no acute distress.  Affect is appropriate.  Speech is fluent.  Recent and remote memory are intact.  Attention span and concentration are normal.     Cranial Nerves:   CN1: grossly intact per patient recall.   CN2: No funduscopic exam performed.   CN3,4 & 6: Pupillary light response, lateral and vertical gaze normal.  No nystagmus.  Visual fields are full to confrontation.   CN5: Intact to touch   CN7: No facial weakness, smile, facial symmetry intact.   CN8: Intact to spoken voice.   CN9&10: Gag reflex, uvula midline, palate rises with phonation.   CN11: Shoulder shrug 5/5 intact bilaterally.   CN12: Tongue midline and moves freely from side to side.   "   Motor: No pronator drift of upper extremity.   Normal bulk and tone all muscle groups of upper and lower extremities.       Delt Bi Tri Hand Flex/  Ext Iliopsoas Quadriceps Tibialis Anterior EHL Gastroc     C5 C6 C7 C8/T1 L2 L3 L4 L5 S1   R 5 5 5 5 5 5 5 5 5   L 5 5 5 5 5 5 5 5 5          Sensory: Sensation intact bilaterally to light touch.     Coordination; finger to nose, rapid alternating movements smooth and rhythmic.   Romberg intact.   Heel/toe/tandem gait intact.    Normal gait and station.     Reflexes;                       Right              Left  Brachioradialis (C5,6)      2+                 2+  Biceps   (C5,6)                 2+                 2+  Triceps  (C7,8)                 2+                2+  Knee (L3,4)                      2+                2+  Ankle jerk (S1,2)              1+                1+      No hoffmans/babinski/ clonus.  FABERS/Camilo test: Positive on the left  SLR bilaterally free  No ower lumbar paraspinal tenderness.    Previous midline incision healed well.    Left knee joint line tenderness present.       IMAGING:  I personally reviewed all radiographic images and agree with the neuroradiology report of degenerative disc disease with HNP primarily at L4-5 and L5-S1.     2/26/2024 MRI lumbar spine:  IMPRESSION:  1.  Right laminectomy at L5-S1.  2.  Degenerative change as detailed.         Cc:   Jaja Tamez

## 2024-04-08 NOTE — LETTER
4/8/2024         RE: Markell Garcia  [address] [address]        Dear Colleague,    Thank you for referring your patient, Markell Garcia, to the [facility]. Please see a copy of my visit note below.    NEUROSURGERY CONSULTATION NOTE  Neurosurgery was asked to see this patient by Anthony Diaz MD for evaluation of left leg and left knee pain.       CONSULTATION ASSESSMENT AND PLAN:    Mr. Garcia is a 58-year-old right-handed gentleman with significant past medical history of GERD, hypercholesterolemia, peripheral neuropathy, right leg pain s/p right L5-S1 microdiscectomy 20 years ago presented to the clinic today for evaluation of left leg pain that started in December 2023 without preceding trauma.  Patient describes that the epicenter of the pain is along the posterior aspect of his left knee and radiating superiorly and inferiorly.  Patient also describes that his left leg pain is different from what he had in his right leg 20 years ago for which he underwent microdiscectomy.    He has positive Westley's on the left and severe joint line tenderness involving his left knee joint.  He has no other focal neurological deficit on examination.    I explained the MRI lumbar spine findings with the patient and showed him the images.  I explained to him that he has degenerative disc disease with HNP at multiple levels primarily at L4-5 and L5-S1 with severe collapse of disc space at L5-S1.  I discussed the natural history of degenerative disc disease and HNP.  I also discussed the management options including conservative treatment with physical therapy and nonsurgical interventions, decompression alone and decompression with fusion.    However prior to finalizing management plan, would like to investigate his left knee being the pain generator with left knee x-rays AP and lateral views and will also refer him to the orthopedic surgeon regarding the same.  I will  also get left lower extremity EMG/NCV studies.  I will follow him with above-mentioned tests in 6 weeks or so.  He can continue physical therapy in the interim.    Patient agreed with the plan.  All the questions were answered and patient sounded understanding.  He can contact us if there are any further questions or concerns or worsening neurological deficits.I spent more than 60 minutes in this apt, examining the pt, reviewing the scans, reviewing notes from chart, discussing treatment options with risks and benefits and coordinating care. This note was created in part by the use of Dragon voice recognition system. Inadvertent grammatical errors and typographical errors may have occurred due to inherent limitation of voice recognition software.  Reasonable attempts made to avoid errors, but this document may contain an error not identified before finalizing.  Please contact me for any clarification needed.     Gerard Campos MD      HPI:    Mr. Garcia is a 58-year-old right-handed gentleman with significant past medical history of GERD, hypercholesterolemia, peripheral neuropathy, right leg pain s/p right L5-S1 microdiscectomy 20 years ago presented to the clinic today for evaluation of left leg pain that started in December 2023 without preceding trauma.    Patient was doing well till few months ago when he started noticing left leg pain which is deep aching in nature 8 x 10 on VAS aggravated with walking relieved with sitting down.  Patient reports that his left leg pain is different from what he had 20 years ago involving his right leg.  Patient denied pain radiating from his low back to his leg.  Patient describes that the epicenter of the pain is his left knee along the posterior aspect and radiating superiorly and inferiorly.  Patient describes that his pain is severe enough that is affecting his walking and day-to-day activities.  Patient has done physical therapy and left L4-5 and L5-S1 transforaminal  epidural steroid injection on 3/13/2024 with some relief for 1 to 2 days with return of pain.  Patient also reports that he was not doing activities mediately after the injection.    Patient denies any weakness involving his left lower extremity or bladder or bowel symptoms.    Of note patient had radicular pain involving his right lower extremity after running and was in excruciating pain which immediately relieved after the microdiscectomy 20 years ago with one of the orthopedic surgeons in [place].  Patient does not remember the name of the hospital or the surgeon.    Patient does not smoke, denies use of recreational drugs and occasionally consumes alcohol.  Patient denies any other significant cardiac or pulmonary history.  He is not on any antiplatelets or anticoagulants.        Past Medical History:   Diagnosis Date     ED (erectile dysfunction)      Gastroesophageal reflux disease without esophagitis      Hammertoe of right foot      Hypercholesteremia      Neuropathy        Past Surgical History:   Procedure Laterality Date     DISCECTOMY LUMBAR MINIMALLY INVASIVE ONE LEVEL  2000     ROTATOR CUFF REPAIR RT/LT Left      ROTATOR CUFF REPAIR RT/LT Right        REVIEW OF SYSTEMS:  Review Of Systems  Skin: negative  Eyes: negative  Ears/Nose/Throat: negative  Respiratory: No shortness of breath, dyspnea on exertion, cough, or hemoptysis  Cardiovascular: negative  Gastrointestinal: negative  Genitourinary: negative  Musculoskeletal: Left leg and knee pain  Neurologic: negative  Psychiatric: negative  Hematologic/Lymphatic/Immunologic: negative  Endocrine: negative    MEDICATIONS:    Current Outpatient Medications   Medication Sig Dispense Refill     Ascorbic Acid (VITAMIN C PO)        Cholecalciferol (VITAMIN D3 PO) Take by mouth daily       Coenzyme Q10 (COQ10 PO)        cyclobenzaprine (FLEXERIL) 5 MG tablet Take 1-2 tablets (5-10 mg) by mouth 3 times daily as needed for muscle spasms (Patient not taking:  Reported on 3/27/2024) 30 tablet 0     fluocinonide (LIDEX) 0.05 % external solution Apply topically 2 times daily as needed (chest rash) 60 mL 0     FOLIC ACID PO Take 1 mg by mouth daily       Glucosamine HCl (GLUCOSAMINE PO)        Multiple Vitamins-Minerals (ZINC PO) Zinc       Omega-3 Fatty Acids (FISH OIL PO)        predniSONE (DELTASONE) 20 MG tablet Take 2 tablets (40 mg) by mouth daily 10 tablet 0     rosuvastatin (CRESTOR) 10 MG tablet Take 1 tablet (10 mg) by mouth daily 90 tablet 3     sildenafil (VIAGRA) 100 MG tablet Take 1 tablet (100 mg) by mouth daily as needed (30-60 minutes before intercourse for ED) 30 tablet 11         ALLERGIES/SENSITIVITIES:     No Known Allergies    PERTINENT SOCIAL HISTORY: Non-smoker  Social History     Socioeconomic History     Marital status:      Spouse name: None     Number of children: None     Years of education: None     Highest education level: None   Tobacco Use     Smoking status: Never     Smokeless tobacco: Never   Vaping Use     Vaping Use: Never used   Substance and Sexual Activity     Alcohol use: Yes     Comment: Once a week, 2-3 beers at a time     Drug use: Never     Sexual activity: Yes     Partners: Female     Birth control/protection: Post-menopausal     Comment: exclusive with partner   Social History Narrative    Moved to [place] 2022 from [place]    Lives with wife    Works in supply chain distribution, works remotely and travels frequently    One brother in [place], one in [place], [place]     Daughter is local     Social Determinants of Health     Financial Resource Strain: Low Risk  (9/24/2023)    Financial Resource Strain      Within the past 12 months, have you or your family members you live with been unable to get utilities (heat, electricity) when it was really needed?: No   Food Insecurity: Low Risk  (9/24/2023)    Food Insecurity      Within the past 12 months, did you worry that your food would run out before you got money to buy  "more?: No      Within the past 12 months, did the food you bought just not last and you didn t have money to get more?: No   Transportation Needs: Low Risk  (9/24/2023)    Transportation Needs      Within the past 12 months, has lack of transportation kept you from medical appointments, getting your medicines, non-medical meetings or appointments, work, or from getting things that you need?: No   Interpersonal Safety: Low Risk  (12/27/2023)    Interpersonal Safety      Do you feel physically and emotionally safe where you currently live?: Yes      Within the past 12 months, have you been hit, slapped, kicked or otherwise physically hurt by someone?: No      Within the past 12 months, have you been humiliated or emotionally abused in other ways by your partner or ex-partner?: No   Housing Stability: Low Risk  (9/24/2023)    Housing Stability      Do you have housing? : Yes      Are you worried about losing your housing?: No         FAMILY HISTORY:  Family History   Problem Relation Age of Onset     Hammer toes Mother      Seizure Disorder Mother      Hyperlipidemia Father      Insulin Resistance Father      Hyperlipidemia Paternal Grandfather      Diabetes Type 2  Paternal Uncle      Peripheral Neuropathy Paternal Uncle      Coronary Artery Disease No family hx of      Cerebrovascular Disease No family hx of      Prostate Cancer No family hx of      Colon Cancer No family hx of         PHYSICAL EXAM:   Constitutional: /88   Pulse 67   Ht 6' 5\" (1.956 m)   Wt 238 lb 12.8 oz (108.3 kg)   SpO2 95%   BMI 28.32 kg/m       Mental Status: A & O in no acute distress.  Affect is appropriate.  Speech is fluent.  Recent and remote memory are intact.  Attention span and concentration are normal.     Cranial Nerves:   CN1: grossly intact per patient recall.   CN2: No funduscopic exam performed.   CN3,4 & 6: Pupillary light response, lateral and vertical gaze normal.  No nystagmus.  Visual fields are full to " confrontation.   CN5: Intact to touch   CN7: No facial weakness, smile, facial symmetry intact.   CN8: Intact to spoken voice.   CN9&10: Gag reflex, uvula midline, palate rises with phonation.   CN11: Shoulder shrug 5/5 intact bilaterally.   CN12: Tongue midline and moves freely from side to side.     Motor: No pronator drift of upper extremity.   Normal bulk and tone all muscle groups of upper and lower extremities.       Delt Bi Tri Hand Flex/  Ext Iliopsoas Quadriceps Tibialis Anterior EHL Gastroc     C5 C6 C7 C8/T1 L2 L3 L4 L5 S1   R 5 5 5 5 5 5 5 5 5   L 5 5 5 5 5 5 5 5 5          Sensory: Sensation intact bilaterally to light touch.     Coordination; finger to nose, rapid alternating movements smooth and rhythmic.   Romberg intact.   Heel/toe/tandem gait intact.    Normal gait and station.     Reflexes;                       Right              Left  Brachioradialis (C5,6)      2+                 2+  Biceps   (C5,6)                 2+                 2+  Triceps  (C7,8)                 2+                2+  Knee (L3,4)                      2+                2+  Ankle jerk (S1,2)              1+                1+      No hoffmans/babinski/ clonus.  FABERS/Camilo test: Positive on the left  SLR bilaterally free  No ower lumbar paraspinal tenderness.    Previous midline incision healed well.    Left knee joint line tenderness present.       IMAGING:  I personally reviewed all radiographic images and agree with the neuroradiology report of degenerative disc disease with HNP primarily at L4-5 and L5-S1.     2/26/2024 MRI lumbar spine:  IMPRESSION:  1.  Right laminectomy at L5-S1.  2.  Degenerative change as detailed.         Cc:   Jaja Tamez                Again, thank you for allowing me to participate in the care of your patient.        Sincerely,        Gerard Campos MD

## 2024-04-08 NOTE — NURSING NOTE
"Markell Garcia is a 58 year old male who presents for:  Chief Complaint   Patient presents with    Consult     Lumbar Radiculopathy; Lumbar degenerative disc disease. Patient states they can sit better after injection. Left buttocks area is more sore than painful, pain travels down to hamstring and down to calf, generally bad in knee area. \"Can barely walk a couple hundred yards.\"        Initial Vitals:  /88   Pulse 67   Ht 6' 5\" (1.956 m)   Wt 238 lb 12.8 oz (108.3 kg)   SpO2 95%   BMI 28.32 kg/m   Estimated body mass index is 28.32 kg/m  as calculated from the following:    Height as of this encounter: 6' 5\" (1.956 m).    Weight as of this encounter: 238 lb 12.8 oz (108.3 kg).. Body surface area is 2.43 meters squared. BP completed using cuff size: regular  Mild Pain (2)        [place]    "

## 2024-04-12 ENCOUNTER — THERAPY VISIT (OUTPATIENT)
Dept: PHYSICAL THERAPY | Facility: CLINIC | Age: 59
End: 2024-04-12
Payer: COMMERCIAL

## 2024-04-12 DIAGNOSIS — M54.42 ACUTE MIDLINE LOW BACK PAIN WITH LEFT-SIDED SCIATICA: Primary | ICD-10-CM

## 2024-04-12 PROCEDURE — 97110 THERAPEUTIC EXERCISES: CPT | Mod: 59

## 2024-04-12 PROCEDURE — 97530 THERAPEUTIC ACTIVITIES: CPT | Mod: GP

## 2024-04-15 ENCOUNTER — OFFICE VISIT (OUTPATIENT)
Dept: PHYSICAL MEDICINE AND REHAB | Facility: CLINIC | Age: 59
End: 2024-04-15
Attending: NEUROLOGICAL SURGERY
Payer: COMMERCIAL

## 2024-04-15 DIAGNOSIS — M54.16 LUMBAR RADICULOPATHY: ICD-10-CM

## 2024-04-15 DIAGNOSIS — M51.369 LUMBAR DEGENERATIVE DISC DISEASE: ICD-10-CM

## 2024-04-15 PROCEDURE — 95910 NRV CNDJ TEST 7-8 STUDIES: CPT | Performed by: PHYSICAL MEDICINE & REHABILITATION

## 2024-04-15 PROCEDURE — 95886 MUSC TEST DONE W/N TEST COMP: CPT | Performed by: PHYSICAL MEDICINE & REHABILITATION

## 2024-04-15 NOTE — PROGRESS NOTES
[facility]  [address] [place] [address] [address]  [place] [address]  Office: [phone] Fax: [phone]    Electromyography and Nerve Conduction Study Report        Indication: Patient presents at the request of Dr. Gerard Campos for left lower extremity EMG.  Complains of left lower extremity pain posterior lateral thigh to the posterior lateral calf above the knee.  Numbness and tingling bilateral feet to the ankles with a history of neuropathy.  On exam he has hammertoes right greater than left, atrophy of the intrinsic muscles of the feet.  He has normal sensation to light touch through the lower extremities.  1+ patellar 0 Achilles reflexes bilaterally.  Normal muscle strength throughout the major muscle groups of the bilateral lower extremities.  Decreased left knee extension.      Pt Exam Discussion (Communication Barriers):  Electromyography and nerve conduction testing, including associated discomfort, risks, benefits, and alternatives was discussed with the patient prior to the procedure.  No learning/ communication barriers; patient verbalized understanding of procedure.  Informed consent was obtained.           Pt Assessment:  Testing was successfully completed; patient tolerated testing well.       Blood Thinners: None   Skin Temperature: Warmed 28.3                   EMG/NCS  results:     Nerve Conduction Studies  Motor Sites      Segment Distal Latency Neg. Amp CV F-Latency F-Estimate Comment   Site  (ms) (mV) (m/s) (ms) (ms)    Left Fibular (EDB) Motor   Ankle Ankle-EDB *NR *NR       Left Fibular (TA) Motor   Fib Head Fib Head-Tib anterior 3.5 3.8       Knee Knee-Fib Head 5.7 *4.6 45      Left Tibial (AH) Motor   Ankle Ankle-AH *NR *NR       Right Tibial (AH) Motor   Ankle Ankle-AH *NR *NR  *NR -      Sensory Sites      Onset Lat Peak Lat Amp CV Comment   Site (ms) (ms) ( V) (m/s)    Left Sural Sensory   B-Ankle *NR *NR *NR *NR    Right Sural Sensory   B-Ankle *NR *NR  *NR *NR        NCS Waveforms:    Motor                Sensory         F-Wave         Electromyography     Side Muscle Nerve Root Ins Act Fibs Psw Fasc Recrt Dur Amp Poly Comment   Left AntTibialis Dp Br Fibular L4-5 Nml Nml Nml Nml Nml Nml Nml 0    Left Gastroc Tibial S1-2 Nml Nml Nml Nml Nml Nml Nml 0    Left Fibularis Long Sup Br Fibular L5-S1 Nml Nml Nml Nml Nml Nml Nml 0    Left VastusLat Femoral L2-4 Nml Nml Nml Nml Nml Nml Nml 0    Left RectFemoris Femoral L2-4 Nml Nml Nml Nml Nml Nml Nml 0          Comment NCS: Abnormal study  1.  Absent bilateral sural SNAPs  2.  Absent bilateral tibial CMAP to the AH muscles.  Absent right tibial F-wave    3.  Absent left peroneal CMAP to the EDB.  4.  Normal left peroneal CMAP to the tibialis anterior    Comment EMG: Normal study  1.  Normal needle EMG left lower extremity.    Interpretation: Abnormal study: There is electrodiagnostic evidence of:    1.  Electrodiagnostic findings are consistent with but not confirmatory for a length-dependent sensorimotor polyneuropathy, predominantly axonal.  This would be consistent with his known history of peripheral polyneuropathy.    2.  There is no electrodiagnostic evidence of lumbosacral radiculopathy, lumbosacral plexopathy, or focal neuropathy in the left lower extremity.    The testing was completed in its entirety by the physician.      It was our pleasure caring for your patient today, if there any questions or concerns please do not hesitate to contact us.

## 2024-04-15 NOTE — LETTER
4/15/2024         RE: Markell Garcia  [address] [address]        Dear Colleague,    Thank you for referring your patient, Markell Garcia, to the [facility]. Please see a copy of my visit note below.    [facility]  [address] [place] [address] [address]  [place] [address]  Office: [phone] Fax: [phone]    Electromyography and Nerve Conduction Study Report        Indication: Patient presents at the request of Dr. Gerard Campos for left lower extremity EMG.  Complains of left lower extremity pain posterior lateral thigh to the posterior lateral calf above the knee.  Numbness and tingling bilateral feet to the ankles with a history of neuropathy.  On exam he has hammertoes right greater than left, atrophy of the intrinsic muscles of the feet.  He has normal sensation to light touch through the lower extremities.  1+ patellar 0 Achilles reflexes bilaterally.  Normal muscle strength throughout the major muscle groups of the bilateral lower extremities.  Decreased left knee extension.      Pt Exam Discussion (Communication Barriers):  Electromyography and nerve conduction testing, including associated discomfort, risks, benefits, and alternatives was discussed with the patient prior to the procedure.  No learning/ communication barriers; patient verbalized understanding of procedure.  Informed consent was obtained.           Pt Assessment:  Testing was successfully completed; patient tolerated testing well.       Blood Thinners: None   Skin Temperature: Warmed 28.3                   EMG/NCS  results:     Nerve Conduction Studies  Motor Sites      Segment Distal Latency Neg. Amp CV F-Latency F-Estimate Comment   Site  (ms) (mV) (m/s) (ms) (ms)    Left Fibular (EDB) Motor   Ankle Ankle-EDB *NR *NR       Left Fibular (TA) Motor   Fib Head Fib Head-Tib anterior 3.5 3.8       Knee Knee-Fib Head 5.7 *4.6 45      Left Tibial (AH) Motor    Ankle Ankle-AH *NR *NR       Right Tibial (AH) Motor   Ankle Ankle-AH *NR *NR  *NR -      Sensory Sites      Onset Lat Peak Lat Amp CV Comment   Site (ms) (ms) ( V) (m/s)    Left Sural Sensory   B-Ankle *NR *NR *NR *NR    Right Sural Sensory   B-Ankle *NR *NR *NR *NR        NCS Waveforms:    Motor                Sensory         F-Wave         Electromyography     Side Muscle Nerve Root Ins Act Fibs Psw Fasc Recrt Dur Amp Poly Comment   Left AntTibialis Dp Br Fibular L4-5 Nml Nml Nml Nml Nml Nml Nml 0    Left Gastroc Tibial S1-2 Nml Nml Nml Nml Nml Nml Nml 0    Left Fibularis Long Sup Br Fibular L5-S1 Nml Nml Nml Nml Nml Nml Nml 0    Left VastusLat Femoral L2-4 Nml Nml Nml Nml Nml Nml Nml 0    Left RectFemoris Femoral L2-4 Nml Nml Nml Nml Nml Nml Nml 0          Comment NCS: Abnormal study  1.  Absent bilateral sural SNAPs  2.  Absent bilateral tibial CMAP to the AH muscles.  Absent right tibial F-wave    3.  Absent left peroneal CMAP to the EDB.  4.  Normal left peroneal CMAP to the tibialis anterior    Comment EMG: Normal study  1.  Normal needle EMG left lower extremity.    Interpretation: Abnormal study: There is electrodiagnostic evidence of:    1.  Electrodiagnostic findings are consistent with but not confirmatory for a length-dependent sensorimotor polyneuropathy, predominantly axonal.  This would be consistent with his known history of peripheral polyneuropathy.    2.  There is no electrodiagnostic evidence of lumbosacral radiculopathy, lumbosacral plexopathy, or focal neuropathy in the left lower extremity.    The testing was completed in its entirety by the physician.      It was our pleasure caring for your patient today, if there any questions or concerns please do not hesitate to contact us.      Again, thank you for allowing me to participate in the care of your patient.        Sincerely,        Misael Amaral, DO

## 2024-04-15 NOTE — PROGRESS NOTES
ASSESSMENT   2 days only of relief from Lumbar TFESI. Relatively no change in symptoms besides that. Symptoms continue to limit sitting with knees bent and walking. Switching focus to knee joint proper to assess response    PLAN  Continue therapy per current plan of care.    Beginning/End Dates of Progress Note Reporting Period:  12/29/23 to 04/12/2024    Referring Provider:  Triston Storm     04/12/24 0500   Appointment Info   Signing clinician's name / credentials Hollie Maier, PT DPT [facility]   Total/Authorized Visits [employer] (12 plan)   Visits Used 6   Medical Diagnosis Left-sided low back pain with left-sided sciatica   PT Tx Diagnosis left knee pain; low back pain with radiating pain; sciatica   Progress Note/Certification   Onset of illness/injury or Date of Surgery 11/29/23   Therapy Frequency 1x/week tapering to 2x/month   Predicted Duration 3 months   Progress Note Completed Date 12/29/23   GOALS   PT Goals 2   PT Goal 1   Goal Identifier Sitting   Goal Description Pt will be able to sit at least 1 hour not limited by leg or back pain   Rationale to maximize safety and independence with performance of ADLs and functional tasks;to maximize safety and independence with transportation  (work and airplane travel)   Goal Progress varies; 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   Rationale to maximize safety and independence with performance of ADLs and functional tasks;to maximize safety and independence with self cares   Goal Progress fluctuates, tolerated walking at car show for 1 hour but then tried 20 min neighborhood walk with pain   Target Date 06/10/24   Subjective Report   Subjective Report Returns to PT after 2 months - underwent lumbar MRI and subsequent  L4-L5 and L5-S1 TFESI on 3/13/2024. Felt relief for 2 days then symptoms returned to near  baseline including lateral leg pain (knee, radiating to lower calf and sometimes proximal into lateral hamstrings), worse with sitting with knees bent and walking. Met with neurosurgery and they are obtaining an EMG and are also considering the knee OA as primary source of symptoms. He reports he stopped most of his exercsies while undergoing this workup. Has been traveling a lot for work still which exacerbates pain because he has to sit on airplanes.   Objective Measures   Objective Measures Objective Measure 1;Objective Measure 2;Objective Measure 3;Objective Measure 4   Objective Measure 1   Objective Measure Symptoms   Details currently, usual mild ache at left mid calf, lateral and slightly behind knee   Objective Measure 2   Objective Measure Left Knee   Details tender to medial joint line palpation. lacking passive extension ~5 deg and flexion to ~100   Objective Measure 3   Details slump: provokes lateral knee pain   Treatment Interventions (PT)   Interventions Therapeutic Procedure/Exercise;Manual Therapy;Therapeutic Activity   Therapeutic Procedure/Exercise   Therapeutic Procedures: strength, endurance, ROM, flexibility minutes (89513) 25   Therapeutic Procedures Ther Proc 2   Ther Proc 1 upright bike   Ther Proc 1 - Details 5 mins, SH 9, level 3-4 - inst in starting stationary bike at home   PTRx Ther Proc 1 Prone Press Ups   PTRx Ther Proc 1 - Details continue per HEP - emphasize consistency    PTRx Ther Proc 2 Standing Gastroc Stretch   PTRx Ther Proc 2 - Details 4x 20 sec last 2 sets with slight inversion/internal rotation to increase stretch at lateral calf   PTRx Ther Proc 3 Seated Hamstring Stretch   PTRx Ther Proc 3 - Details 2x 20 sec - emphasis on hip hinge, muscle stretch   PTRx Ther Proc 4 Knee Extension in Sitting with Patient Overpressure   PTRx Ther Proc 4 - Details x10 light-mod pressure, pt ed in difference between muscle stretch vs. joint mobilization   PTRx Ther Proc 5 Stepdown Backward  "  PTRx Ther Proc 5 - Details 6\" 2x 10 - no pain, mild fatigue in quad   PTRx Ther Proc 6 Hip Abduction Straight Leg Raise   PTRx Ther Proc 6 - Details reviewed with videos - continue per HEP   PTRx Ther Proc 7 Toe Raises   PTRx Ther Proc 7 - Details heels off step x10   PTRx Ther Proc 8 Dumbbell Squats   PTRx Ther Proc 8 - Details 2x 12 with 20# - no pain, minor form adjustments for comfort   Skilled Intervention exercise modification/progression; explained rationale & dosage   Patient Response/Progress tolerates well   Therapeutic Activity   Therapeutic Activities: dynamic activities to improve functional performance minutes (54986) 10   Ther Act 1 Pt ed   PTRx Ther Act 1 Education Sheet General   PTRx Ther Act 1 - Details Inflammation control: topical Voltaren, wear compression sleeve, elevate when at rest, use oral NSAIDs   PTRx Ther Act 2 re-testing, goals discussion, POC discussion & pt education on role of PT   Skilled Intervention self-management techniques for sx management   Ther Act 1 - Details reviewed lumbar / sciatic pain vs. knee joint pain differential; encouraged ortho eval with knee injection to help identify symptom generators   Patient Response/Progress receptive   Education   Learner/Method Patient   Plan   Home program videos   Updates to plan of care switch focus to knee rx   Plan for next session quad & hip strength; L knee mobility; lumbar extension & nerve glides PRN   Total Session Time   Timed Code Treatment Minutes 35   Total Treatment Time (sum of timed and untimed services) 35     "

## 2024-04-15 NOTE — PATIENT INSTRUCTIONS
Thank you for choosing the [facility] for your EMG testing.    The ordering provider will receive your final EMG results within the next few days.  Please follow up with your provider for the results and further treatment recommendations.

## 2024-04-22 NOTE — TELEPHONE ENCOUNTER
DIAGNOSIS: (L) Knee pain    APPOINTMENT DATE:    NOTES STATUS DETAILS   OFFICE NOTE from referring provider  Self Referred    EMG report Internal 6/23/2022   MEDICATION LIST Internal    XRAYS (IMAGES & REPORTS) Internal Xray Knee Left 4/8/2024

## 2024-04-26 ENCOUNTER — PRE VISIT (OUTPATIENT)
Dept: ORTHOPEDICS | Facility: CLINIC | Age: 59
End: 2024-04-26

## 2024-04-26 ENCOUNTER — OFFICE VISIT (OUTPATIENT)
Dept: ORTHOPEDICS | Facility: CLINIC | Age: 59
End: 2024-04-26
Payer: COMMERCIAL

## 2024-04-26 ENCOUNTER — THERAPY VISIT (OUTPATIENT)
Dept: PHYSICAL THERAPY | Facility: CLINIC | Age: 59
End: 2024-04-26
Payer: COMMERCIAL

## 2024-04-26 VITALS — BODY MASS INDEX: 28.1 KG/M2 | WEIGHT: 238 LBS | HEIGHT: 77 IN

## 2024-04-26 DIAGNOSIS — M54.42 ACUTE MIDLINE LOW BACK PAIN WITH LEFT-SIDED SCIATICA: Primary | ICD-10-CM

## 2024-04-26 DIAGNOSIS — M17.12 PRIMARY OSTEOARTHRITIS OF LEFT KNEE: Primary | ICD-10-CM

## 2024-04-26 DIAGNOSIS — M79.662 PAIN OF LEFT LOWER LEG: ICD-10-CM

## 2024-04-26 PROCEDURE — 97140 MANUAL THERAPY 1/> REGIONS: CPT | Mod: GP

## 2024-04-26 PROCEDURE — 97110 THERAPEUTIC EXERCISES: CPT | Mod: GP

## 2024-04-26 PROCEDURE — 20610 DRAIN/INJ JOINT/BURSA W/O US: CPT | Mod: LT | Performed by: FAMILY MEDICINE

## 2024-04-26 PROCEDURE — 99203 OFFICE O/P NEW LOW 30 MIN: CPT | Mod: 25 | Performed by: FAMILY MEDICINE

## 2024-04-26 RX ORDER — LIDOCAINE HYDROCHLORIDE 10 MG/ML
4 INJECTION, SOLUTION EPIDURAL; INFILTRATION; INTRACAUDAL; PERINEURAL
Status: SHIPPED | OUTPATIENT
Start: 2024-04-26

## 2024-04-26 RX ORDER — TRIAMCINOLONE ACETONIDE 40 MG/ML
40 INJECTION, SUSPENSION INTRA-ARTICULAR; INTRAMUSCULAR
Status: SHIPPED | OUTPATIENT
Start: 2024-04-26

## 2024-04-26 RX ADMIN — LIDOCAINE HYDROCHLORIDE 4 ML: 10 INJECTION, SOLUTION EPIDURAL; INFILTRATION; INTRACAUDAL; PERINEURAL at 11:52

## 2024-04-26 RX ADMIN — TRIAMCINOLONE ACETONIDE 40 MG: 40 INJECTION, SUSPENSION INTRA-ARTICULAR; INTRAMUSCULAR at 11:52

## 2024-04-26 NOTE — PROGRESS NOTES
CHIEF COMPLAINT:  Consult (Left knee)       HISTORY OF PRESENT ILLNESS  Mr. Garcia is a pleasant 58 year old year old male who presents to clinic today with left knee pain.  Markell explains that he started to have pain in his calf and hamstring and had his back evaluated but they think the majority of his pain is coming from his knee.     Onset: sudden  Location: left knee  Quality:  aching and throbing  Duration: November 2023  Severity: 6/10 at worst  Timing:intermittent episodes worse with movement and walking  Modifying factors:  resting and non-use makes it better, movement and use makes it worse  Associated signs & symptoms: pain, swelling, occasional instability   Previous similar pain: Yes  Treatments to date: PT, ice, ibuprofen, compression sleeves, Voltaren gel    Additional history: as documented    Review of Systems:  Have you recently had a a fever, chills, weight loss? No  Do you have any vision problems? No  Do you have any chest pain or edema? No  Do you have any shortness of breath or wheezing?  No  Do you have stomach problems? No  Do you have any numbness or focal weakness? Yes, right leg weakness, neuropathy bilateral feet  Do you have diabetes? No  Do you have problems with bleeding or clotting? No  Do you have an rashes or other skin lesions? No    MEDICAL HISTORY  Patient Active Problem List   Diagnosis    Neuropathy    Hypercholesteremia    Hammertoe of right foot    ED (erectile dysfunction)    Gastroesophageal reflux disease without esophagitis    Acute midline low back pain with left-sided sciatica    Pain of left lower leg       Current Outpatient Medications   Medication Sig Dispense Refill    Ascorbic Acid (VITAMIN C PO)       Cholecalciferol (VITAMIN D3 PO) Take by mouth daily      Coenzyme Q10 (COQ10 PO)       cyclobenzaprine (FLEXERIL) 5 MG tablet Take 1-2 tablets (5-10 mg) by mouth 3 times daily as needed for muscle spasms (Patient not taking: Reported on 3/27/2024) 30 tablet 0     "fluocinonide (LIDEX) 0.05 % external solution Apply topically 2 times daily as needed (chest rash) 60 mL 0    FOLIC ACID PO Take 1 mg by mouth daily      Glucosamine HCl (GLUCOSAMINE PO)       Multiple Vitamins-Minerals (ZINC PO) Zinc      Omega-3 Fatty Acids (FISH OIL PO)       predniSONE (DELTASONE) 20 MG tablet Take 2 tablets (40 mg) by mouth daily 10 tablet 0    rosuvastatin (CRESTOR) 10 MG tablet Take 1 tablet (10 mg) by mouth daily 90 tablet 3    sildenafil (VIAGRA) 100 MG tablet Take 1 tablet (100 mg) by mouth daily as needed (30-60 minutes before intercourse for ED) 30 tablet 11       No Known Allergies    Family History   Problem Relation Age of Onset    Hammer toes Mother     Seizure Disorder Mother     Hyperlipidemia Father     Insulin Resistance Father     Hyperlipidemia Paternal Grandfather     Diabetes Type 2  Paternal Uncle     Peripheral Neuropathy Paternal Uncle     Coronary Artery Disease No family hx of     Cerebrovascular Disease No family hx of     Prostate Cancer No family hx of     Colon Cancer No family hx of        Additional medical/Social/Surgical histories reviewed in [facility] and updated as appropriate.       PHYSICAL EXAM  Ht 1.956 m (6' 5\")   Wt 108 kg (238 lb)   BMI 28.22 kg/m      General  - normal appearance, in no obvious distress  Musculoskeletal - left knee  - stance: mildly antalgic gait, mild genu varum  - inspection: mild effusion  - palpation: medial joint line AND lateral joint line tenderness  - ROM: 110 degrees flexion, 0 degrees extension, painful active ROM  - strength: 5/5 in flexion, 5/5 in extension  - special tests:  (-) Mirna  (-) varus at 0 and 30 degrees flexion  (-) valgus at 0 and 30 degrees flexion  Neuro  - no sensory or motor deficit, grossly normal coordination, normal muscle tone     IMAGING :   EXAM: XR KNEE LEFT 3 VIEWS  LOCATION: [facility]  DATE: 4/8/2024     INDICATION: Pain.  COMPARISON: None.                                  "                                     IMPRESSION: Tricompartmental degenerative changes which are severe in the medial compartment. There is no evidence of an acute fracture. There is a knee joint effusion.      Atherosclerotic vascular calcifications.    Independent evaluation of left knee x-ray revealing moderately severe osteoarthritis of the medial compartment of left knee.    ASSESSMENT & PLAN  Markell Garcia is a 58 year old year old male past medical history of lumbar radiculitis s/p right L5-S1 laminectomy who presents to clinic today with acute on chronic left knee pain worsening over the past 5 months    Diagnosis: Primary osteoarthritis of left knee    -Continue with physical therapy course for knee  -Knee sleeve for activity  -Trial of  brace with PT, may pursue brace if improvement is noted  -Corticosteroid injection today, consider HA in the future  -Follow up 3 months to reevaluate progress    PROCEDURE    Left Knee Injection - Intraarticular  The patient was informed of the risks and the benefits of the procedure and a written consent was signed.  The patient s left knee was prepped with chlorhexidine in sterile fashion.   40 mg of triamcinolone suspension was drawn up into a 5 mL syringe with 4 mL of 1% lidocaine.  Injection was performed using substerile technique.  A 1.5-inch 22-gauge needle was used to enter the lateral aspect of the left knee.  Injection performed successfully without difficulty.  There were no complications. The patient tolerated the procedure well. There was negligible bleeding.   The patient was instructed to ice the knee upon leaving clinic and refrain from overuse over the next 3 days.   The patient was instructed to call or go to the emergency room with any unusual pain, swelling, redness, or if otherwise concerned.  A follow up appointment will be scheduled to evaluate response to the injection, and to assess range of motion and pain.  Large Joint Injection: L knee  joint    Date/Time: 4/26/2024 11:52 AM    Performed by: Charles Lowry DO  Authorized by: Charles Lowry DO    Indications:  Pain and osteoarthritis  Needle Size comment:  23g  Guidance: landmark guided    Approach:  Anterolateral  Location:  Knee      Medications:  40 mg triamcinolone 40 MG/ML; 4 mL lidocaine (PF) 1 %  Outcome:  Tolerated well, no immediate complications  Procedure discussed: discussed risks, benefits, and alternatives    Consent Given by:  Patient  Timeout: timeout called immediately prior to procedure    Prep: patient was prepped and draped in usual sterile fashion          It was a pleasure seeing Markell today.    Charles Lowry DO, CAQSM  Primary Care Sports Medicine

## 2024-04-26 NOTE — NURSING NOTE
[address] [address]  Dept: [phone]  ______________________________________________________________________________    Patient: Markell Garcia   : 1965   MRN: [id number]   2024    INVASIVE PROCEDURE SAFETY CHECKLIST    Date: 24   Procedure: Left knee kenalog injection  Patient Name: Markell Garcia  MRN: [id number]  YOB: 1965    Action: Complete sections as appropriate. Any discrepancy results in a HARD COPY until resolved.     PRE PROCEDURE:  Patient ID verified with 2 identifiers (name and  or MRN): Yes  Procedure and site verified with patient/designee (when able): Yes  Accurate consent documentation in medical record: Yes  H&P (or appropriate assessment) documented in medical record: Yes  H&P must be up to 20 days prior to procedure and updates within 24 hours of procedure as applicable: NA  Relevant diagnostic and radiology test results appropriately labeled and displayed as applicable: Yes  Procedure site(s) marked with provider initials: NA    TIMEOUT:  Time-Out performed immediately prior to starting procedure, including verbal and active participation of all team members addressing the following:Yes  * Correct patient identify  * Confirmed that the correct side and site are marked  * An accurate procedure consent form  * Agreement on the procedure to be done  * Correct patient position  * Relevant images and results are properly labeled and appropriately displayed  * The need to administer antibiotics or fluids for irrigation purposes during the procedure as applicable   * Safety precautions based on patient history or medication use    DURING PROCEDURE: Verification of correct person, site, and procedures any time the responsibility for care of the patient is transferred to another member of the care team.       Prior to injection, verified patient identity using patient's name and date of  birth.  Due to injection administration, patient instructed to remain in clinic for 15 minutes  afterwards, and to report any adverse reaction to me immediately.    Joint injection was performed.      Drug Amount Wasted:  Yes: 1 mg/ml   Vial/Syringe: Single dose vial  Expiration Date:  05/2027      Sofia Mccormick, ATC  April 26, 2024

## 2024-04-26 NOTE — LETTER
4/26/2024      RE: Markell Garcia  [address] [facility] [address]     Dear Colleague,    Thank you for referring your patient, Markell Garcia, to the [facility]. Please see a copy of my visit note below.    CHIEF COMPLAINT:  Consult (Left knee)       HISTORY OF PRESENT ILLNESS  Mr. Garcia is a pleasant 58 year old year old male who presents to clinic today with left knee pain.  Markell explains that he started to have pain in his calf and hamstring and had his back evaluated but they think the majority of his pain is coming from his knee.     Onset: sudden  Location: left knee  Quality:  aching and throbing  Duration: November 2023  Severity: 6/10 at worst  Timing:intermittent episodes worse with movement and walking  Modifying factors:  resting and non-use makes it better, movement and use makes it worse  Associated signs & symptoms: pain, swelling, occasional instability   Previous similar pain: Yes  Treatments to date: PT, ice, ibuprofen, compression sleeves, Voltaren gel    Additional history: as documented    Review of Systems:  Have you recently had a a fever, chills, weight loss? No  Do you have any vision problems? No  Do you have any chest pain or edema? No  Do you have any shortness of breath or wheezing?  No  Do you have stomach problems? No  Do you have any numbness or focal weakness? Yes, right leg weakness, neuropathy bilateral feet  Do you have diabetes? No  Do you have problems with bleeding or clotting? No  Do you have an rashes or other skin lesions? No    MEDICAL HISTORY  Patient Active Problem List   Diagnosis     Neuropathy     Hypercholesteremia     Hammertoe of right foot     ED (erectile dysfunction)     Gastroesophageal reflux disease without esophagitis     Acute midline low back pain with left-sided sciatica     Pain of left lower leg       Current Outpatient Medications   Medication Sig Dispense Refill     Ascorbic Acid (VITAMIN C  "PO)        Cholecalciferol (VITAMIN D3 PO) Take by mouth daily       Coenzyme Q10 (COQ10 PO)        cyclobenzaprine (FLEXERIL) 5 MG tablet Take 1-2 tablets (5-10 mg) by mouth 3 times daily as needed for muscle spasms (Patient not taking: Reported on 3/27/2024) 30 tablet 0     fluocinonide (LIDEX) 0.05 % external solution Apply topically 2 times daily as needed (chest rash) 60 mL 0     FOLIC ACID PO Take 1 mg by mouth daily       Glucosamine HCl (GLUCOSAMINE PO)        Multiple Vitamins-Minerals (ZINC PO) Zinc       Omega-3 Fatty Acids (FISH OIL PO)        predniSONE (DELTASONE) 20 MG tablet Take 2 tablets (40 mg) by mouth daily 10 tablet 0     rosuvastatin (CRESTOR) 10 MG tablet Take 1 tablet (10 mg) by mouth daily 90 tablet 3     sildenafil (VIAGRA) 100 MG tablet Take 1 tablet (100 mg) by mouth daily as needed (30-60 minutes before intercourse for ED) 30 tablet 11       No Known Allergies    Family History   Problem Relation Age of Onset     Hammer toes Mother      Seizure Disorder Mother      Hyperlipidemia Father      Insulin Resistance Father      Hyperlipidemia Paternal Grandfather      Diabetes Type 2  Paternal Uncle      Peripheral Neuropathy Paternal Uncle      Coronary Artery Disease No family hx of      Cerebrovascular Disease No family hx of      Prostate Cancer No family hx of      Colon Cancer No family hx of        Additional medical/Social/Surgical histories reviewed in [facility] and updated as appropriate.       PHYSICAL EXAM  Ht 1.956 m (6' 5\")   Wt 108 kg (238 lb)   BMI 28.22 kg/m      General  - normal appearance, in no obvious distress  Musculoskeletal - left knee  - stance: mildly antalgic gait, mild genu varum  - inspection: mild effusion  - palpation: medial joint line AND lateral joint line tenderness  - ROM: 110 degrees flexion, 0 degrees extension, painful active ROM  - strength: 5/5 in flexion, 5/5 in extension  - special tests:  (-) Mirna  (-) varus at 0 and 30 degrees flexion  (-) valgus " at 0 and 30 degrees flexion  Neuro  - no sensory or motor deficit, grossly normal coordination, normal muscle tone     IMAGING :   EXAM: XR KNEE LEFT 3 VIEWS  LOCATION: [facility]  DATE: 4/8/2024     INDICATION: Pain.  COMPARISON: None.                                                                      IMPRESSION: Tricompartmental degenerative changes which are severe in the medial compartment. There is no evidence of an acute fracture. There is a knee joint effusion.      Atherosclerotic vascular calcifications.    Independent evaluation of left knee x-ray revealing moderately severe osteoarthritis of the medial compartment of left knee.    ASSESSMENT & PLAN  Markell Garcia is a 58 year old year old male past medical history of lumbar radiculitis s/p right L5-S1 laminectomy who presents to clinic today with acute on chronic left knee pain worsening over the past 5 months    Diagnosis: Primary osteoarthritis of left knee    -Continue with physical therapy course for knee  -Knee sleeve for activity  -Trial of  brace with PT, may pursue brace if improvement is noted  -Corticosteroid injection today, consider HA in the future  -Follow up 3 months to reevaluate progress    PROCEDURE    Left Knee Injection - Intraarticular  The patient was informed of the risks and the benefits of the procedure and a written consent was signed.  The patient s left knee was prepped with chlorhexidine in sterile fashion.   40 mg of triamcinolone suspension was drawn up into a 5 mL syringe with 4 mL of 1% lidocaine.  Injection was performed using substerile technique.  A 1.5-inch 22-gauge needle was used to enter the lateral aspect of the left knee.  Injection performed successfully without difficulty.  There were no complications. The patient tolerated the procedure well. There was negligible bleeding.   The patient was instructed to ice the knee upon leaving clinic and refrain from overuse over the next  3 days.   The patient was instructed to call or go to the emergency room with any unusual pain, swelling, redness, or if otherwise concerned.  A follow up appointment will be scheduled to evaluate response to the injection, and to assess range of motion and pain.  Large Joint Injection: L knee joint    Date/Time: 4/26/2024 11:52 AM    Performed by: Charles Lowry DO  Authorized by: Charles Lowry DO    Indications:  Pain and osteoarthritis  Needle Size comment:  23g  Guidance: landmark guided    Approach:  Anterolateral  Location:  Knee      Medications:  40 mg triamcinolone 40 MG/ML; 4 mL lidocaine (PF) 1 %  Outcome:  Tolerated well, no immediate complications  Procedure discussed: discussed risks, benefits, and alternatives    Consent Given by:  Patient  Timeout: timeout called immediately prior to procedure    Prep: patient was prepped and draped in usual sterile fashion          It was a pleasure seeing Markell today.    Charles Lowry DO, [facility]  Primary Care Sports Medicine      Again, thank you for allowing me to participate in the care of your patient.      Sincerely,    Charles Lowry DO

## 2024-05-03 ENCOUNTER — THERAPY VISIT (OUTPATIENT)
Dept: PHYSICAL THERAPY | Facility: CLINIC | Age: 59
End: 2024-05-03
Payer: COMMERCIAL

## 2024-05-03 DIAGNOSIS — M54.42 ACUTE MIDLINE LOW BACK PAIN WITH LEFT-SIDED SCIATICA: Primary | ICD-10-CM

## 2024-05-03 PROCEDURE — 97140 MANUAL THERAPY 1/> REGIONS: CPT | Mod: GP

## 2024-05-03 PROCEDURE — 97110 THERAPEUTIC EXERCISES: CPT | Mod: GP

## 2024-05-10 ENCOUNTER — THERAPY VISIT (OUTPATIENT)
Dept: PHYSICAL THERAPY | Facility: CLINIC | Age: 59
End: 2024-05-10
Payer: COMMERCIAL

## 2024-05-10 DIAGNOSIS — M54.42 ACUTE MIDLINE LOW BACK PAIN WITH LEFT-SIDED SCIATICA: Primary | ICD-10-CM

## 2024-05-10 PROCEDURE — 97110 THERAPEUTIC EXERCISES: CPT | Mod: GP

## 2024-06-14 ENCOUNTER — THERAPY VISIT (OUTPATIENT)
Dept: PHYSICAL THERAPY | Facility: CLINIC | Age: 59
End: 2024-06-14
Payer: COMMERCIAL

## 2024-06-14 DIAGNOSIS — M54.42 ACUTE MIDLINE LOW BACK PAIN WITH LEFT-SIDED SCIATICA: Primary | ICD-10-CM

## 2024-06-14 DIAGNOSIS — M79.662 PAIN OF LEFT LOWER LEG: ICD-10-CM

## 2024-06-14 PROCEDURE — 97110 THERAPEUTIC EXERCISES: CPT | Mod: GP

## 2024-07-01 ENCOUNTER — OFFICE VISIT (OUTPATIENT)
Dept: NEUROSURGERY | Facility: CLINIC | Age: 59
End: 2024-07-01
Payer: COMMERCIAL

## 2024-07-01 VITALS — SYSTOLIC BLOOD PRESSURE: 128 MMHG | HEART RATE: 66 BPM | OXYGEN SATURATION: 96 % | DIASTOLIC BLOOD PRESSURE: 81 MMHG

## 2024-07-01 DIAGNOSIS — M54.16 LUMBAR RADICULOPATHY: Primary | ICD-10-CM

## 2024-07-01 PROCEDURE — 99213 OFFICE O/P EST LOW 20 MIN: CPT | Performed by: NEUROLOGICAL SURGERY

## 2024-07-01 RX ORDER — MELOXICAM 15 MG/1
15 TABLET ORAL DAILY
Qty: 14 TABLET | Refills: 0 | Status: SHIPPED | OUTPATIENT
Start: 2024-07-01

## 2024-07-01 RX ORDER — CYCLOBENZAPRINE HCL 10 MG
10 TABLET ORAL 2 TIMES DAILY PRN
Qty: 28 TABLET | Refills: 0 | Status: SHIPPED | OUTPATIENT
Start: 2024-07-01

## 2024-07-01 ASSESSMENT — PAIN SCALES - GENERAL: PAINLEVEL: MILD PAIN (2)

## 2024-07-01 NOTE — LETTER
7/1/2024      Markell Garcia  [address] [facility] [address]      Dear Colleague,    Thank you for referring your patient, Markell Garcia, to the [facility]. Please see a copy of my visit note below.    NEUROSURGERY FOLLOWUP  NOTE    Markell Garcia comes today in f/u with EMG/NCV studies and conservative management and orthopedic evaluation for left knee pain.    Patient received left knee corticosteroid injection on 4/26/2024.    Following the injection patient reports significant improvement in his left leg symptoms with almost complete resolution of his left thigh and leg pain.  He denies any new onset neurological symptoms today.  He reports intermittent low back and right hip pain.  He also reports intermittent numbness involving his bilateral feet.    Patient is prediabetic and nonhypertensive with no other systemic diseases.    4/15/2024 EMG/NCV studies:  nterpretation: Abnormal study: There is electrodiagnostic evidence of:     1.  Electrodiagnostic findings are consistent with but not confirmatory for a length-dependent sensorimotor polyneuropathy, predominantly axonal.  This would be consistent with his known history of peripheral polyneuropathy.     2.  There is no electrodiagnostic evidence of lumbosacral radiculopathy, lumbosacral plexopathy, or focal neuropathy in the left lower extremity.         PHYSICAL EXAM:   Constitutional: /81 (BP Location: Left arm, Patient Position: Sitting, Cuff Size: Adult Regular)   Pulse 66   SpO2 96%      Mental Status: A & O in no acute distress.  Affect is appropriate.  Speech is fluent.  Recent and remote memory are intact.  Attention span and concentration are normal.     Motor: No pronator drift of upper extremity.   Normal bulk and tone all muscle groups of upper and lower extremities.       Delt Bi Tri Hand Flex/  Ext Iliopsoas Quadriceps Tibialis Anterior EHL Gastroc     C5 C6 C7 C8/T1 L2 L3 L4 L5 S1   R 5 5 5  5 5 5 5 5 5   L 5 5 5 5 5 5 5 5 5        Sensory: Sensation intact bilaterally to light touch.      Coordination:   Heel/toe/ gait intact. Intact tandem gait      Reflexes; supinator, biceps, triceps, knee/ ankle jerk intact.  No hoffmans/   no babinski/ clonus.    Patient is wearing left knee brace    IMAGING:   I personally reviewed all radiographic images and agree with the radiology report of multilevel degenerative disc disease with no significant spondylolisthesis.    4/8/2024 x-ray lumbar spine:  IMPRESSION: No significant spondylolisthesis or dynamic instability between flexion and extension views. Vertebral body heights are maintained. Severe disc space height loss at L5-S1 and mild to moderate height loss at L4-L5. Multilevel endplate   osteophyte formation, greatest at L5-S1. Mild lower lumbar facet arthropathy. Spondylosis is better characterized on recent MRI. Sacroiliac joints are maintained.     04/08/2024 x-ray left knee:   IMPRESSION: Tricompartmental degenerative changes which are severe in the medial compartment. There is no evidence of an acute fracture. There is a knee joint effusion.      Atherosclerotic vascular calcifications.     CONSULTATION ASSESSMENT AND PLAN:    Mr. Garcia is a 58-year-old right-handed gentleman with significant past medical history of GERD, hypercholesterolemia, peripheral neuropathy, right leg pain s/p right L5-S1 microdiscectomy 20 years ago initially presented to the clinic  for evaluation of left leg pain that started in December 2023 without preceding trauma.  Patient described that the epicenter of the pain is along the posterior aspect of his left knee and radiating superiorly and inferiorly.  Patient also describes that his left leg pain is different from what he had in his right leg 20 years ago for which he underwent microdiscectomy.     Patient is in the clinic today with EMG/NCV studies, orthopedic evaluation of his left knee which resulted in a left  knee corticosteroid injection.  Patient reports significant improvement in his left leg symptoms following the injection with almost complete resolution.      His EMG/NCV studies are consistent but not confirmatory for a length dependent sensorimotor polyneuropathy predominantly axonal.    I again explained the MRI lumbar spine findings with the patient and showed him the images.  I explained to him that he has degenerative disc disease with HNP at multiple levels primarily at L4-5 and L5-S1 with severe collapse of disc space at L5-S1.  I again discussed the management options including conservative treatment with physical therapy and nonsurgical interventions, decompression alone and decompression with fusion.    Since patient has improved with left knee injections intermittent low back pain, I would recommend continuation of conservative management at this point with physical therapy and follow-up with spine medicine team.  I will also refer him to the neurology for evaluation and management of polyneuropathy.  I will prescribe him meloxicam 15 mg once a day for 2 weeks and Flexeril 10 mg twice a day for 2 weeks for his intermittent low back pain.  He can continue to follow with the spine medicine team and physical therapy.  He can follow-up with me on as-needed basis.     Patient agreed with the plan.  All the questions were answered and patient sounded understanding.  He can contact us if there are any further questions or concerns or worsening neurological deficits.I spent more than 20 minutes in this apt, examining the pt, reviewing the scans, reviewing notes from chart, discussing treatment options with risks and benefits and coordinating care. This note was created in part by the use of Dragon voice recognition system. Inadvertent grammatical errors and typographical errors may have occurred due to inherent limitation of voice recognition software.  Reasonable attempts made to avoid errors, but this document  may contain an error not identified before finalizing.  Please contact me for any clarification needed.        Gerard Campos MD      CC:     Jaja Tamez  [address] [id number]      Again, thank you for allowing me to participate in the care of your patient.        Sincerely,        Gerard Campos MD

## 2024-07-01 NOTE — PATIENT INSTRUCTIONS
Follow-up with neurosurgery as needed.  Cyclobenzaprine (Flexeril) 10 mg twice daily for 14 days  Meloxicam (Mobic) 15 mg once daily for 14 days  Neurology consult for polyneuropathy

## 2024-07-01 NOTE — PROGRESS NOTES
NEUROSURGERY FOLLOWUP  NOTE    Markell Garcia comes today in f/u with EMG/NCV studies and conservative management and orthopedic evaluation for left knee pain.    Patient received left knee corticosteroid injection on 4/26/2024.    Following the injection patient reports significant improvement in his left leg symptoms with almost complete resolution of his left thigh and leg pain.  He denies any new onset neurological symptoms today.  He reports intermittent low back and right hip pain.  He also reports intermittent numbness involving his bilateral feet.    Patient is prediabetic and nonhypertensive with no other systemic diseases.    4/15/2024 EMG/NCV studies:  nterpretation: Abnormal study: There is electrodiagnostic evidence of:     1.  Electrodiagnostic findings are consistent with but not confirmatory for a length-dependent sensorimotor polyneuropathy, predominantly axonal.  This would be consistent with his known history of peripheral polyneuropathy.     2.  There is no electrodiagnostic evidence of lumbosacral radiculopathy, lumbosacral plexopathy, or focal neuropathy in the left lower extremity.         PHYSICAL EXAM:   Constitutional: /81 (BP Location: Left arm, Patient Position: Sitting, Cuff Size: Adult Regular)   Pulse 66   SpO2 96%      Mental Status: A & O in no acute distress.  Affect is appropriate.  Speech is fluent.  Recent and remote memory are intact.  Attention span and concentration are normal.     Motor: No pronator drift of upper extremity.   Normal bulk and tone all muscle groups of upper and lower extremities.       Delt Bi Tri Hand Flex/  Ext Iliopsoas Quadriceps Tibialis Anterior EHL Gastroc     C5 C6 C7 C8/T1 L2 L3 L4 L5 S1   R 5 5 5 5 5 5 5 5 5   L 5 5 5 5 5 5 5 5 5        Sensory: Sensation intact bilaterally to light touch.      Coordination:   Heel/toe/ gait intact. Intact tandem gait      Reflexes; supinator, biceps, triceps, knee/ ankle jerk intact.  No hoffmans/   no  babinski/ clonus.    Patient is wearing left knee brace    IMAGING:   I personally reviewed all radiographic images and agree with the radiology report of multilevel degenerative disc disease with no significant spondylolisthesis.    4/8/2024 x-ray lumbar spine:  IMPRESSION: No significant spondylolisthesis or dynamic instability between flexion and extension views. Vertebral body heights are maintained. Severe disc space height loss at L5-S1 and mild to moderate height loss at L4-L5. Multilevel endplate   osteophyte formation, greatest at L5-S1. Mild lower lumbar facet arthropathy. Spondylosis is better characterized on recent MRI. Sacroiliac joints are maintained.     04/08/2024 x-ray left knee:   IMPRESSION: Tricompartmental degenerative changes which are severe in the medial compartment. There is no evidence of an acute fracture. There is a knee joint effusion.      Atherosclerotic vascular calcifications.     CONSULTATION ASSESSMENT AND PLAN:    Mr. Garcia is a 58-year-old right-handed gentleman with significant past medical history of GERD, hypercholesterolemia, peripheral neuropathy, right leg pain s/p right L5-S1 microdiscectomy 20 years ago initially presented to the clinic  for evaluation of left leg pain that started in December 2023 without preceding trauma.  Patient described that the epicenter of the pain is along the posterior aspect of his left knee and radiating superiorly and inferiorly.  Patient also describes that his left leg pain is different from what he had in his right leg 20 years ago for which he underwent microdiscectomy.     Patient is in the clinic today with EMG/NCV studies, orthopedic evaluation of his left knee which resulted in a left knee corticosteroid injection.  Patient reports significant improvement in his left leg symptoms following the injection with almost complete resolution.      His EMG/NCV studies are consistent but not confirmatory for a length dependent  sensorimotor polyneuropathy predominantly axonal.    I again explained the MRI lumbar spine findings with the patient and showed him the images.  I explained to him that he has degenerative disc disease with HNP at multiple levels primarily at L4-5 and L5-S1 with severe collapse of disc space at L5-S1.  I again discussed the management options including conservative treatment with physical therapy and nonsurgical interventions, decompression alone and decompression with fusion.    Since patient has improved with left knee injections intermittent low back pain, I would recommend continuation of conservative management at this point with physical therapy and follow-up with spine medicine team.  I will also refer him to the neurology for evaluation and management of polyneuropathy.  I will prescribe him meloxicam 15 mg once a day for 2 weeks and Flexeril 10 mg twice a day for 2 weeks for his intermittent low back pain.  He can continue to follow with the spine medicine team and physical therapy.  He can follow-up with me on as-needed basis.     Patient agreed with the plan.  All the questions were answered and patient sounded understanding.  He can contact us if there are any further questions or concerns or worsening neurological deficits.I spent more than 20 minutes in this apt, examining the pt, reviewing the scans, reviewing notes from chart, discussing treatment options with risks and benefits and coordinating care. This note was created in part by the use of Dragon voice recognition system. Inadvertent grammatical errors and typographical errors may have occurred due to inherent limitation of voice recognition software.  Reasonable attempts made to avoid errors, but this document may contain an error not identified before finalizing.  Please contact me for any clarification needed.        Gerard Campos MD      CC:     Jaja Tamez  [address] [address]

## 2024-07-19 ENCOUNTER — THERAPY VISIT (OUTPATIENT)
Dept: PHYSICAL THERAPY | Facility: CLINIC | Age: 59
End: 2024-07-19
Payer: COMMERCIAL

## 2024-07-19 DIAGNOSIS — M54.42 ACUTE MIDLINE LOW BACK PAIN WITH LEFT-SIDED SCIATICA: Primary | ICD-10-CM

## 2024-07-19 PROCEDURE — 97110 THERAPEUTIC EXERCISES: CPT | Mod: GP

## 2024-07-19 PROCEDURE — 97140 MANUAL THERAPY 1/> REGIONS: CPT | Mod: GP

## 2024-07-22 PROBLEM — M54.42 ACUTE MIDLINE LOW BACK PAIN WITH LEFT-SIDED SCIATICA: Status: RESOLVED | Noted: 2023-12-29 | Resolved: 2024-07-22

## 2024-07-22 NOTE — PROGRESS NOTES
DISCHARGE  Reason for Discharge: Patient has met all goals. Able to sit and walk without limitation from L LE/knee.     Equipment Issued: theraband; PTRx videos    Discharge Plan: Patient to continue home program.  Return to PT PRN.   Will formally evaluate new complaint of Right hip pain next after pt obtains provider referral.     Referring Provider:  Triston Storm     07/19/24 0500   Appointment Info   Signing clinician's name / credentials Hollie Maier PT DPT [facility]   Total/Authorized Visits E&T (12 plan)   Visits Used 11   Medical Diagnosis Left-sided low back pain with left-sided sciatica   PT Tx Diagnosis left knee pain; low back pain with radiating pain; sciatica   Progress Note/Certification   Onset of illness/injury or Date of Surgery 11/29/23   Therapy Frequency 1x/week tapering to 2x/month   Predicted Duration 3 months   Progress Note Completed Date 04/15/24   GOALS   PT Goals 2   PT Goal 1   Goal Identifier Sitting   Goal Description Pt will be able to sit at least 1 hour not limited by leg or back pain   Rationale to maximize safety and independence with performance of ADLs and functional tasks;to maximize safety and independence with transportation  (work and airplane travel)   Goal Progress goal met - sitting for plane rides without difficulty   Target Date 06/10/24   Date Met 06/14/24   PT Goal 2   Goal Identifier Ambulation   Goal Description Pt will be able to walk at least 30 mins not limited by leg pain.   Rationale to maximize safety and independence with performance of ADLs and functional tasks;to maximize safety and independence with self cares   Goal Progress can ambulate 30 mins; occasional discomfort starting around 15 minutes   Target Date 06/10/24   Date Met 05/10/24   Subjective Report   Subjective Report Returns after 1 month, overall doing well. Left knee & left leg symptoms are feeling good. Only slight discomfort with longer walks. He is retiring at the end of next week and will  "have more time to consistently go to the gym/use more weights. Still having intermittent right posterior & lateral hip pain. Can hurt at night even if not sleeping on that side. Lateral hip points to greater troch - more annoying than glute/low back.   Objective Measures   Objective Measures Objective Measure 1;Objective Measure 2;Objective Measure 3;Objective Measure 4   Objective Measure 1   Details genu varum; difficulty with lunge due to balance/stability - lunge tendency to round/slouch at spine   Objective Measure 2   Objective Measure R low back screen   Details currently: lateral R hip. lumbar flexion WNL NE. lumbar ext min loss NE. SL stork R +; quadrant -   Objective Measure 3   Objective Measure R hip screen   Details decreased flexibility with ROBERT; post/lat hip pain provoked with ROBERT   Treatment Interventions (PT)   Interventions Therapeutic Procedure/Exercise;Manual Therapy;Therapeutic Activity   Therapeutic Procedure/Exercise   Therapeutic Procedures: strength, endurance, ROM, flexibility minutes (10636) 25   Therapeutic Procedures Ther Proc 2   Ther Proc 1 upright bike   Ther Proc 1 - Details 5 mins, SH 10, level 2-3 for warmup.   Ther Proc 2 S/L Hip Abduction   Ther Proc 2 - Details 2x 8 R, reviewed proper form, remains fatiguing   PTRx Ther Proc 1 Split Squat   PTRx Ther Proc 1 - Details 2x 10 each leg with B hand support on counter, tactile & verbal cues for level pelvis\   PTRx Ther Proc 2 Piriformis stretch   PTRx Ther Proc 2 - Details hooklying with hip IR/Add - reports stretch no pain, 2x 30\" R. verbal review of seated version which pt self selected to do at home   PTRx Ther Proc 3 Prone Hip Rotation   PTRx Ther Proc 3 - Details aarom --> arom x20, R   PTRx Ther Proc 4 TE   PTRx Ther Proc 4 - Details extra time to review current HEP & discuss independence, self-progressions; dosage & rationale for each, frequency of strength vs. stretching   PTRx Ther Proc 6 TE   PTRx Ther Proc 6 - Details " discussed strategies for weaning into a consistent strength routine   Skilled Intervention instruction/cueing for exercise form, dosage & rationale; to promote independence with home exercises   Patient Response/Progress tolerates well, glute fatigue   Manual Therapy   Manual Therapy: Mobilization, MFR, MLD, friction massage minutes (63315) 8   Manual Therapy 1 STM   Manual Therapy 1 - Details mod-deep pressure & TrP release to R piriformis, glute med, glute min, TFL - tender, but tolerable.   Skilled Intervention soft tissue techniques to decrease pain & increase ROM   Patient Response/Progress slight increase in ROBERT ROM   Education   Learner/Method Patient   Plan   Home program videos   Updates to plan of care reviewed & progressed strength/consolidated HEP. d/c from Left LE plan of care.   Plan for next session d/c with HEP. eval R low back/hip   Total Session Time   Timed Code Treatment Minutes 33   Total Treatment Time (sum of timed and untimed services) 33

## 2024-07-24 DIAGNOSIS — M51.369 LUMBAR DEGENERATIVE DISC DISEASE: ICD-10-CM

## 2024-07-24 DIAGNOSIS — M54.16 LUMBAR RADICULOPATHY: Primary | ICD-10-CM

## 2024-09-05 NOTE — PROGRESS NOTES
Assessment & Plan   Markell Garcia is a 58 year old year old male with a history of GERD and elevated cholesterol who presents to clinic today for a few weeks of groin pain.  Initially strained muscles, but then developed more generalized discomfort.  Suspect pelvic floor pain, related to chronic constipation worsened by vacation.  Reassuring exam in clinic     Inguinal pain, unspecified laterality  Referral to Urology.  Treat constipation.  Wear supportive underwear.  Can use ice or tylneol prn   - Adult Urology  Referral; Future    Chronic idiopathic constipation  Recommend daily miralax, discussed titration     Hypercholesteremia  - Refilled rosuvastatin (CRESTOR) 10 MG tablet; Take 1 tablet (10 mg) by mouth daily.    Colon cancer screening  - Colonoscopy Screening  Referral; Future    Follow up for physical later this fall     Subjective   Markell is a 58 year old, presenting for the following health issues:  No chief complaint on file.    HPI   Groin pain  - started on right outer hip, still bothering a litltle   - felt like pulled right groin about 5 weeks ago  - then had more general groin pain, it's been weeks   - last few days, some left groin pain   - maybe feels like testicles are swollen   - no other swelling  - one night, was felt a little sharper - and that went away  - once it's there, it's there whether standing or sitting  - yesterday afternoon - was on feet - acted up - settled down later in the day  - tried tighter pair of underwear - didn't help much  - no pain with urination, no pain with sex   - got worse when he was on vacation, got very constipated   -has been working with PT helped the knee and back    Constipation  - on vacation  - went 4 days without BM  - at baseline, colace every day, otherwise stool is really hard  - last colonoscopy 5 years ago in [place], due now for follow up           Objective    /73   Pulse 60   Temp 97.4  F (36.3  C)   Ht 1.956 m  "(6' 5.01\")   Wt 105.7 kg (233 lb)   SpO2 97%   BMI 27.62 kg/m    Body mass index is 27.62 kg/m .  Physical Exam   GENERAL: alert and no distress   (male): normal male genitalia without lesions or urethral discharge, no hernia  MS: no gross musculoskeletal defects noted, no edema  Normal exam         Total time spent with patient 32 minutes, including review of cart, care of patient, coordination of care and communication of care plan.    Signed Electronically by: Haritha Barton MD    "

## 2024-09-06 ENCOUNTER — OFFICE VISIT (OUTPATIENT)
Dept: FAMILY MEDICINE | Facility: CLINIC | Age: 59
End: 2024-09-06
Payer: COMMERCIAL

## 2024-09-06 VITALS
SYSTOLIC BLOOD PRESSURE: 124 MMHG | HEIGHT: 77 IN | DIASTOLIC BLOOD PRESSURE: 73 MMHG | OXYGEN SATURATION: 97 % | HEART RATE: 60 BPM | WEIGHT: 233 LBS | BODY MASS INDEX: 27.51 KG/M2 | TEMPERATURE: 97.4 F

## 2024-09-06 DIAGNOSIS — K59.04 CHRONIC IDIOPATHIC CONSTIPATION: ICD-10-CM

## 2024-09-06 DIAGNOSIS — Z12.11 COLON CANCER SCREENING: ICD-10-CM

## 2024-09-06 DIAGNOSIS — E78.00 HYPERCHOLESTEREMIA: ICD-10-CM

## 2024-09-06 DIAGNOSIS — R10.30 INGUINAL PAIN, UNSPECIFIED LATERALITY: Primary | ICD-10-CM

## 2024-09-06 RX ORDER — ROSUVASTATIN CALCIUM 10 MG/1
10 TABLET, COATED ORAL DAILY
Qty: 90 TABLET | Refills: 3 | Status: SHIPPED | OUTPATIENT
Start: 2024-09-06

## 2024-09-06 NOTE — PATIENT INSTRUCTIONS
For constipation  - try miralax.  It is available over the counter.    - start with 1 capful daily in any liquid   - try that for 4 days, then you can increase the dose to 2 capfuls once daily, or even 3.  When you adjust the dose - leave it for 4 days before making another change   - the goal is at least one soft, formed stool daily that passes easily      A colonoscopy is a great idea  - [facility] will call you to schedule   - it's it far out - call [facility] and see how soon they can see you.  And then let me know and we'll fax the referral       I'm placing a referral to Urology   - they will call you to schedule  - if it's better by then cancel it    Wear supportive underwear   - you could take some tylenol or use an ice pack      Come back for a physical

## 2024-10-19 ENCOUNTER — OFFICE VISIT (OUTPATIENT)
Dept: URGENT CARE | Facility: URGENT CARE | Age: 59
End: 2024-10-19
Payer: COMMERCIAL

## 2024-10-19 VITALS
HEIGHT: 77 IN | SYSTOLIC BLOOD PRESSURE: 133 MMHG | TEMPERATURE: 98.1 F | HEART RATE: 69 BPM | DIASTOLIC BLOOD PRESSURE: 80 MMHG | RESPIRATION RATE: 20 BRPM | BODY MASS INDEX: 27.16 KG/M2 | WEIGHT: 230 LBS | OXYGEN SATURATION: 97 %

## 2024-10-19 DIAGNOSIS — L03.031 CELLULITIS OF GREAT TOE OF RIGHT FOOT: Primary | ICD-10-CM

## 2024-10-19 PROCEDURE — 99213 OFFICE O/P EST LOW 20 MIN: CPT | Performed by: PHYSICIAN ASSISTANT

## 2024-10-19 RX ORDER — CEFDINIR 300 MG/1
300 CAPSULE ORAL 2 TIMES DAILY
Qty: 14 CAPSULE | Refills: 0 | Status: SHIPPED | OUTPATIENT
Start: 2024-10-19 | End: 2024-10-26

## 2024-10-19 NOTE — PATIENT INSTRUCTIONS
Patient was educated on the natural course of condition. Take medication as prescribed. Side effects discussed.  Conservative measures discussed including over-the-counter analgesics (Tylenol or Ibuprofen). Observe carefully for any signs of increased redness or pain in the affected area. See your primary care provider if symptoms worsen or do not improve in 7 days. Seek emergency care if you develop severe pain, streaking, or fever.

## 2024-10-19 NOTE — PROGRESS NOTES
URGENT CARE VISIT:    SUBJECTIVE:   Chief Complaint   Patient presents with    Urgent Care     Pt in clinic to have eval for right foot great toe redness, swelling and discharge.      Markell Garcia is a 59 year old male who presents with a chief complaint of right great toe pain, swelling, and redness.  Symptoms began a few days ago. No known injury. He has hammer toe and walked a lot while on vacation.   Pain exacerbated by touch. Relieved by rest.  He treated it initially with soaks. This is not the first time this type of injury has occurred to this patient.     PMH:   Past Medical History:   Diagnosis Date    ED (erectile dysfunction)     Gastroesophageal reflux disease without esophagitis     Hammertoe of right foot     Hypercholesteremia     Neuropathy      Allergies: Patient has no known allergies.   Medications:   Current Outpatient Medications   Medication Sig Dispense Refill    Ascorbic Acid (VITAMIN C PO)       cefdinir (OMNICEF) 300 MG capsule Take 1 capsule (300 mg) by mouth 2 times daily for 7 days. 14 capsule 0    Cholecalciferol (VITAMIN D3 PO) Take by mouth daily      Coenzyme Q10 (COQ10 PO)       fluocinonide (LIDEX) 0.05 % external solution Apply topically 2 times daily as needed (chest rash) 60 mL 0    FOLIC ACID PO Take 1 mg by mouth daily      Glucosamine HCl (GLUCOSAMINE PO)       Multiple Vitamins-Minerals (ZINC PO) Zinc      Omega-3 Fatty Acids (FISH OIL PO)       rosuvastatin (CRESTOR) 10 MG tablet Take 1 tablet (10 mg) by mouth daily. 90 tablet 3    sildenafil (VIAGRA) 100 MG tablet Take 1 tablet (100 mg) by mouth daily as needed (30-60 minutes before intercourse for ED) 30 tablet 11    cyclobenzaprine (FLEXERIL) 10 MG tablet Take 1 tablet (10 mg) by mouth 2 times daily as needed for muscle spasms 28 tablet 0    cyclobenzaprine (FLEXERIL) 5 MG tablet Take 1-2 tablets (5-10 mg) by mouth 3 times daily as needed for muscle spasms 30 tablet 0    meloxicam (MOBIC) 15 MG tablet Take 1 tablet  "(15 mg) by mouth daily 14 tablet 0    predniSONE (DELTASONE) 20 MG tablet Take 2 tablets (40 mg) by mouth daily 10 tablet 0     Social History:   Social History     Tobacco Use    Smoking status: Never    Smokeless tobacco: Never   Substance Use Topics    Alcohol use: Yes     Comment: Once a week, 2-3 beers at a time       ROS:  Review of systems negative except as stated above.    OBJECTIVE:  /80   Pulse 69   Temp 98.1  F (36.7  C) (Temporal)   Resp 20   Ht 1.956 m (6' 5\")   Wt 104.3 kg (230 lb)   SpO2 97%   BMI 27.27 kg/m    GENERAL APPEARANCE: healthy, alert and no distress  MUSCULOSKELETAL: moderate TTP over distal right great toe.  EXTREMITIES: peripheral pulses normal  SKIN: open sore on distal tip of right great toe. There is moderate edema and erythema of toe and dorsum of foot  NEURO: sensation intact         ASSESSMENT:    ICD-10-CM    1. Cellulitis of great toe of right foot  L03.031 cefdinir (OMNICEF) 300 MG capsule          PLAN:  Patient Instructions   Patient was educated on the natural course of condition. Take medication as prescribed. Side effects discussed.  Conservative measures discussed including over-the-counter analgesics (Tylenol or Ibuprofen). Observe carefully for any signs of increased redness or pain in the affected area. See your primary care provider if symptoms worsen or do not improve in 7 days. Seek emergency care if you develop severe pain, streaking, or fever.     Patient verbalized understanding and is agreeable to plan. The patient was discharged ambulatory and in stable condition.    Terri Martines PA-C on 10/19/2024 at 3:13 PM    "

## 2024-10-21 ENCOUNTER — OFFICE VISIT (OUTPATIENT)
Dept: FAMILY MEDICINE | Facility: CLINIC | Age: 59
End: 2024-10-21
Payer: COMMERCIAL

## 2024-10-21 VITALS
RESPIRATION RATE: 12 BRPM | OXYGEN SATURATION: 100 % | WEIGHT: 226 LBS | BODY MASS INDEX: 26.68 KG/M2 | HEIGHT: 77 IN | SYSTOLIC BLOOD PRESSURE: 132 MMHG | HEART RATE: 71 BPM | DIASTOLIC BLOOD PRESSURE: 81 MMHG | TEMPERATURE: 98.1 F

## 2024-10-21 DIAGNOSIS — M20.41 HAMMERTOE OF RIGHT FOOT: ICD-10-CM

## 2024-10-21 DIAGNOSIS — Z23 ENCOUNTER FOR IMMUNIZATION: ICD-10-CM

## 2024-10-21 DIAGNOSIS — L03.031 CELLULITIS OF RIGHT TOE: Primary | ICD-10-CM

## 2024-10-21 PROCEDURE — 90480 ADMN SARSCOV2 VAC 1/ONLY CMP: CPT

## 2024-10-21 PROCEDURE — 91320 SARSCV2 VAC 30MCG TRS-SUC IM: CPT

## 2024-10-21 PROCEDURE — 99214 OFFICE O/P EST MOD 30 MIN: CPT | Mod: 25

## 2024-10-21 PROCEDURE — 90673 RIV3 VACCINE NO PRESERV IM: CPT

## 2024-10-21 PROCEDURE — 90471 IMMUNIZATION ADMIN: CPT

## 2024-10-21 ASSESSMENT — PAIN SCALES - GENERAL: PAINLEVEL: MILD PAIN (2)

## 2024-10-21 NOTE — PROGRESS NOTES
Assessment & Plan     (L03.031) Cellulitis of right toe  (primary encounter diagnosis)  Comment: Chronic with acute flare.  Patient has recurrent episodes of cellulitis of her right toe largely related to presence of hammertoe that has not yet been managed.  Vital signs stable, patient is not toxic appearing, no concerns for global sepsis, septic joint, or findings that would warrant the need for immediate medical attention. very reassured that patient is actually seeing improvement in the beginning days of antibiotic treatment.  Will continue on with cefdinir, and also educated on wound care moving forward.  Would like him to follow-up with podiatry for ongoing management specifically related to hammertoe, and of course follow-up in the clinic if symptoms of cellulitis or not improving over the next week. Offered education on medications including appropriate dosing, possible side effects, and possible adverse effects.  Education given on return to clinic instructions as well as alarm signs that would require the need for immediate medical attention.  Patient attested to understanding.  Plan: Orthopedic  Referral    (M20.41) Hammertoe of right foot  Comment: Chronic with ongoing concerns.  Patient has seen podiatry in the past, but has never elected to move forward with more permanent management options for hammertoe including surgical options.  Considering that hammertoe continues to cause patient fairly frequent episodes of infection, I do think that neck steps in more permanent management are necessary.  Would like patient to follow-up with podiatry to discuss hammertoe management moving forward, and specifically infection risk reduction related to this chronic concern. Education given on return to clinic instructions as well as alarm signs that would require the need for immediate medical attention.  Patient attested to understanding.  Plan: Orthopedic  Referral    (Z23) Encounter for  immunization  Comment: No history of adverse reactions to immunization.  Patient was offered risks and benefits of immunization in the clinic today as well as informed consent for possible adverse reactions.  Patient is electing to go forward with immunization today  Plan: COVID-19 12+ (PFIZER), INFLUENZA VACCINE         TRIVALENT(FLUBLOK)       This progress note has been dictated, with use of voice recognition software. Any grammatical, typographical, or context errors are unintentional and inherent to use of voice recognition software.     I spent a total of 21 minutes on the day of the visit.   Time spent by me doing chart review, history and exam, documentation and further activities per the note    FUTURE APPOINTMENTS:       - Follow-up visit in 1 week if symptoms worsen or do not improve       - Make appointment with podiatry specialist       - Follow-up for annual visit or as needed  Patient Instructions   Your toe does continue to be infected, but I am happy to hear that you are seeing improvement even in the early stages of taking the antibiotics.  Healing of this wound will take some time, but the most important piece is that we continue to see improvement as time goes on.    Please continue to take your cefdinir as prescribed by the urgent care provider.  It is very important that you take the entire course of the medication regardless of symptom improvement.  This medication can sometimes cause stomach upset, so taking it with food can help to prevent this.  Common and normal side effects of antibiotic use include mild stomach upset and some loose stool.  Side effects that are not normal would be excruciating abdominal pain, full body rash, or persistent vomiting.  If you experience any of the symptoms, please stop taking the medication and let me know right away.     Please make sure that you keep the area clean, dry, and covered to reduce risk for introducing new or worsening infection.  You should  clean the area once daily with warm water and gentle soap, pat dry gently, apply a small amount of Aquaphor or Vaseline to the area, and keep it covered with a bandage.    I would like you to follow-up with podiatry, considering that this has been an ongoing issue for you.  I think you will be a good resource to discuss next steps in hammertoe management and risk reduction for ongoing infection.    Please follow-up in about a week in primary care if your symptoms are worsening or not improving.    Please seek immediate medical attention with symptoms including loss of strength or sensation in the affected extremity, worsening swelling, redness that is streaking up your foot or leg, a wound that is growing in size, persistent colorful or foul-smelling discharge, fever, chills, body aches, fatigue, or nausea and vomiting    Subjective   Markell is a 59 year old, presenting for the following health issues:  Follow Up (Right big toe infection.  EDU visit 10/19 and started antibiotics.)        10/21/2024     9:23 AM   Additional Questions   Roomed by Lazara DAI     History of Present Illness       Reason for visit:  I have hammer toe on my eight big toe. I got a cut on the end of the toe and it wouldnt heal while on vacation. That cit atill bleeds and weeps and it ended up getting infected. This has happened in the past and antibiotics were needed.    He eats 2-3 servings of fruits and vegetables daily.He consumes 1 sweetened beverage(s) daily.He exercises with enough effort to increase his heart rate 30 to 60 minutes per day.  He exercises with enough effort to increase his heart rate 4 days per week.   He is taking medications regularly.  Markell is a 59-year-old male with a past medical history significant for polyneuropathy, hypercholesterolemia, [place] of the right foot, erectile dysfunction, GERD, and pain of his left lower leg who presents today with ongoing infection in his great toe on his right foot.  Patient notes  that he has a longstanding history of hammertoe concerns and recurrent infection in this toe over the years.  He has been seen by podiatry, and notes that he has had some significant discussion about surgical management of this concern, but ultimately has historically elected to continue to monitor the concern.  Over time, he has needed treatment for cellulitis of the great toe largely related to bumping and irritation of the distal portion of the tip of his toe related to the nature of the anatomy and the hammertoe.  This infection has been present about 3-4 times requiring antibiotic treatment.  He does his best to file the callus down the tip of his right great toe to irritation, but notes that generally what occurs is that a blister forms underneath the callus, ruptures, and leads to infection and poor healing.  He does have a history of neuropathy in his feet, but notes that he pays very close attention to the rate of healing of his foot wounds, and does his best to have these managed medically as soon as possible.  He has not seen podiatry in a number of years, and notes that he has not had concern with a hammertoe up until approximately a week ago.  He noted a small cut on the tip of his toe that was fairly persistently bleeding.  He is going on a trip to [place], and thought this was likely resolve as time went on.  He noted that it persistently bled throughout his trip, as he was doing quite a lot of walking, and eventually began to excrete colorful discharge as well.  He returned home on October 18, and considering the fact that he had soaked through his sock at that time, he elected to be seen in urgent care.  He was noted to have cellulitis of the right great toe, and was prescribed a course of cefdinir for management.  He has started on this course, and follows up today for further evaluation, as he is concerned that the cut is not closing.  When he was seen in the urgent care, he was having heat,  "redness, and swelling up to his ankle, and continued colorful discharge.  He notes that the swelling has since resolved, declines any ongoing streaking redness, and notes that the pain is significantly improved.  He is still has a small amount of discharge coming from the tip of his toe, and attests to continued neuropathy in the area, but declines any worsening numbness, tingling, or any weakness in the extremity.  He declines any fever, chills, body aches, fatigue, abdominal pain, nausea, vomiting, or diarrhea.  He is not scheduled an appointment to follow-up with podiatry.  Other than the antibiotic, he is also soaking the toe in warm water and doing his best to keep it covered with a Band-Aid.      Review of Systems  Constitutional, HEENT, cardiovascular, pulmonary, gi and gu systems are negative, except as otherwise noted.      Objective    /81 (BP Location: Left arm, Patient Position: Sitting, Cuff Size: Adult Regular)   Pulse 71   Temp 98.1  F (36.7  C) (Tympanic)   Resp 12   Ht 1.956 m (6' 5.01\")   Wt 102.5 kg (226 lb)   SpO2 100%   BMI 26.79 kg/m    Body mass index is 26.79 kg/m .  Physical Exam   GENERAL: alert and no distress  EYES: Eyes grossly normal to inspection, PERRL and conjunctivae and sclerae normal  HENT: ear canals and TM's normal, nose and mouth without ulcers or lesions  NECK: no adenopathy, no asymmetry, masses, or scars  RESP: lungs clear to auscultation - no rales, rhonchi or wheezes  CV: regular rate and rhythm, normal S1 S2, no S3 or S4, no murmur, click or rub, no peripheral edema  ABDOMEN: soft, nontender, no hepatosplenomegaly, no masses and bowel sounds normal  MS: no gross musculoskeletal defects noted, no edema  SKIN: Mild erythema and tenderness to palpation at distal aspect of tip of right great toe with small cut and small amount of serosanguineous discharge.  Peripheral pulses intact, no streaking redness, and sensation mildly reduced in the area    Nidia E. " HUGO Beach FNP-C  Family Nurse Practitioner - Same Day Provider  [facility] [place]        Signed Electronically by: LARISSA Galicia CNP

## 2024-10-21 NOTE — NURSING NOTE
Pt tolerated injection (Covid 19 12+ Pfizer Comirnaty Vaccine and Influenza Vaccine 18 to 64yrs: FluBlok). Site was cleansed with alcohol prior to injections. No pain, burning, swelling or redness at the site of the injection. Patient instructed to remain in clinic for 15 minutes afterwards, and to report any adverse reactions

## 2024-10-21 NOTE — PATIENT INSTRUCTIONS
Your toe does continue to be infected, but I am happy to hear that you are seeing improvement even in the early stages of taking the antibiotics.  Healing of this wound will take some time, but the most important piece is that we continue to see improvement as time goes on.    Please continue to take your cefdinir as prescribed by the urgent care provider.  It is very important that you take the entire course of the medication regardless of symptom improvement.  This medication can sometimes cause stomach upset, so taking it with food can help to prevent this.  Common and normal side effects of antibiotic use include mild stomach upset and some loose stool.  Side effects that are not normal would be excruciating abdominal pain, full body rash, or persistent vomiting.  If you experience any of the symptoms, please stop taking the medication and let me know right away.     Please make sure that you keep the area clean, dry, and covered to reduce risk for introducing new or worsening infection.  You should clean the area once daily with warm water and gentle soap, pat dry gently, apply a small amount of Aquaphor or Vaseline to the area, and keep it covered with a bandage.    I would like you to follow-up with podiatry, considering that this has been an ongoing issue for you.  I think you will be a good resource to discuss next steps in hammertoe management and risk reduction for ongoing infection.    Please follow-up in about a week in primary care if your symptoms are worsening or not improving.    Please seek immediate medical attention with symptoms including loss of strength or sensation in the affected extremity, worsening swelling, redness that is streaking up your foot or leg, a wound that is growing in size, persistent colorful or foul-smelling discharge, fever, chills, body aches, fatigue, or nausea and vomiting

## 2024-10-23 ENCOUNTER — TELEPHONE (OUTPATIENT)
Dept: GASTROENTEROLOGY | Facility: CLINIC | Age: 59
End: 2024-10-23
Payer: COMMERCIAL

## 2024-10-23 NOTE — TELEPHONE ENCOUNTER
"Endoscopy Scheduling Screen    Have you had any respiratory illness or flu-like symptoms in the last 10 days?  No    What is your communication preference for Instructions and/or Bowel Prep?   MyChart    What insurance is in the chart?  Other:      Ordering/Referring Provider:     MARIPOSA BROWN      (If ordering provider performs procedure, schedule with ordering provider unless otherwise instructed. )    BMI: Estimated body mass index is 26.79 kg/m  as calculated from the following:    Height as of 10/21/24: 1.956 m (6' 5.01\").    Weight as of 10/21/24: 102.5 kg (226 lb).     Sedation Ordered  moderate sedation.   If patient BMI > 50 do not schedule in [facility].    If patient BMI > 45 do not schedule at [facility].    Are you taking methadone or Suboxone?  NO, No RN review required.    Have you been diagnosed and are being treated for severe PTSD or severe anxiety?  NO, No RN review required.    Are you taking any prescription medications for pain 3 or more times per week?   NO, No RN review required.    Do you have a history of malignant hyperthermia?  No    (Females) Are you currently pregnant?        Have you been diagnosed or told you have pulmonary hypertension?   No    Do you have an LVAD?  No    Have you been told you have moderate to severe sleep apnea?  No.    Have you been told you have COPD, asthma, or any other lung disease?  No    Do you have any heart conditions?  No     Have you ever had or are you waiting for an organ transplant?  No. Continue scheduling, no site restrictions.    Have you had a stroke or transient ischemic attack (TIA aka \"mini stroke\" in the last 6 months?   No    Have you been diagnosed with or been told you have cirrhosis of the liver?   No.    Are you currently on dialysis?   No    Do you need assistance transferring?   No    BMI: Estimated body mass index is 26.79 kg/m  as calculated from the following:    Height as of 10/21/24: 1.956 m (6' 5.01\").    Weight as of 10/21/24: 102.5 kg " (226 lb).     Is patients BMI > 40 and scheduling location [facility]?  No    Do you take an injectable or oral medication for weight loss or diabetes (excluding insulin)?  No    Do you take the medication Naltrexone?  No    Do you take blood thinners?  No       Prep   Are you currently on dialysis or do you have chronic kidney disease?  No    Do you have a diagnosis of diabetes?  No    Do you have a diagnosis of cystic fibrosis (CF)?  No    On a regular basis do you go 3 -5 days between bowel movements?  No    BMI > 40?  No    Preferred Pharmacy:    [id number] - [place], [place] - [address]  [address] [address]  Phone: [phone] Fax: [phone]    Final Scheduling Details     Procedure scheduled  Colonoscopy    Surgeon:  GRANT     Date of procedure:  11/22     Pre-OP / PAC:   No - Not required for this site.    Location  [facility] - [facility] - Per order.    Sedation   Moderate Sedation - Per order.      Patient Reminders:   You will receive a call from a Nurse to review instructions and health history.  This assessment must be completed prior to your procedure.  Failure to complete the Nurse assessment may result in the procedure being cancelled.      On the day of your procedure, please designate an adult(s) who can drive you home stay with you for the next 24 hours. The medicines used in the exam will make you sleepy. You will not be able to drive.      You cannot take public transportation, ride share services, or non-medical taxi service without a responsible caregiver.  Medical transport services are allowed with the requirement that a responsible caregiver will receive you at your destination.  We require that drivers and caregivers are confirmed prior to your procedure.

## 2024-10-24 NOTE — TELEPHONE ENCOUNTER
RECORDS RECEIVED FROM: Internal    REASON FOR VISIT: Lumbar radiculopathy   PROVIDER: Orlando Doyle DO   DATE OF APPT: 12/4/24 @ 7:00 am    NOTES (FOR ALL VISITS) STATUS DETAILS   OFFICE NOTE from referring provider Internal 7/1/24, 4/8/24 Gerard Campos MD @[facility]     OFFICE NOTE from other specialist Internal 4/15/24 Misael Amaral DO @[facility]    3/27/24, 3/1/24 Anthony Diaz MD @[facility]    2/16/24 Bala Villasenor MD @[facility]    12/27/23 Triston Storm MD @[facility]     MEDICATION LIST Internal    IMAGING  (FOR ALL VISITS)     X-RAY Internal [facility]  4/8/24 XR Lumbar Spine     MRI (HEAD, NECK, SPINE) Internal [facility]  2/26/24 MR Lumbar Spine

## 2024-10-25 ENCOUNTER — PATIENT OUTREACH (OUTPATIENT)
Dept: CARE COORDINATION | Facility: CLINIC | Age: 59
End: 2024-10-25
Payer: COMMERCIAL

## 2024-10-28 ENCOUNTER — VIRTUAL VISIT (OUTPATIENT)
Dept: FAMILY MEDICINE | Facility: CLINIC | Age: 59
End: 2024-10-28
Payer: COMMERCIAL

## 2024-10-28 ENCOUNTER — PATIENT OUTREACH (OUTPATIENT)
Dept: CARE COORDINATION | Facility: CLINIC | Age: 59
End: 2024-10-28

## 2024-10-28 DIAGNOSIS — M20.41 HAMMERTOE OF RIGHT FOOT: Primary | ICD-10-CM

## 2024-10-28 DIAGNOSIS — L03.031 CELLULITIS OF RIGHT TOE: ICD-10-CM

## 2024-10-28 PROCEDURE — 99213 OFFICE O/P EST LOW 20 MIN: CPT | Mod: 95 | Performed by: INTERNAL MEDICINE

## 2024-10-28 RX ORDER — DOXYCYCLINE HYCLATE 100 MG
100 TABLET ORAL 2 TIMES DAILY
Qty: 20 TABLET | Refills: 0 | Status: SHIPPED | OUTPATIENT
Start: 2024-10-28

## 2024-10-28 NOTE — PROGRESS NOTES
"Markell is a 59 year old who is being evaluated via a billable video visit.    How would you like to obtain your AVS? MyChart  If the video visit is dropped, the invitation should be resent by: Text to cell phone: [phone]  Will anyone else be joining your video visit? No      Assessment & Plan     Hammertoe of right foot  History of hammertoe  He had recurrent cellulitis in the past because of this  Recently had noted a blister and later after the cellulitis  He was walking a lot and until he  He was seen first in urgent care and later by another provider here in the system   He was treated with Omnicef  However the swelling is still persisting and he is getting some discharge from it  My concern is there could be underlying osteomyelitis  I will get an x-ray  He needs to see a podiatry for more definitive treatment plans  - XR Toe Right G/E 2 Views; Future  - Orthopedic  Referral; Future    Cellulitis of right toe  Already finished 1 course of Omnicef  I will start him on some doxycycline to cover for any potential MRSA  - doxycycline hyclate (VIBRA-TABS) 100 MG tablet; Take 1 tablet (100 mg) by mouth 2 times daily.  - Orthopedic  Referral; Future          BMI  Estimated body mass index is 26.79 kg/m  as calculated from the following:    Height as of 10/21/24: 1.956 m (6' 5.01\").    Weight as of 10/21/24: 102.5 kg (226 lb).             Subjective   Markell is a 59 year old, presenting for the following health issues:  Infected toe       10/28/2024     2:11 PM   Additional Questions   Roomed by Meghan     History of Present Illness       Reason for visit:  I have hammer toe on my eight big toe. I got a cut on the end of the toe and it wouldnt heal while on vacation. That cit atill bleeds and weeps and it ended up getting infected. This has happened in the past and antibiotics were needed.    He eats 2-3 servings of fruits and vegetables daily.He consumes 1 sweetened beverage(s) daily.He exercises " with enough effort to increase his heart rate 30 to 60 minutes per day.  He exercises with enough effort to increase his heart rate 4 days per week.   He is taking medications regularly.       Pain in right toe Pt has redness and a cut at the end that continues to have color looks like a yellow color.         Review of Systems  Constitutional, HEENT, cardiovascular, pulmonary, gi and gu systems are negative, except as otherwise noted.      Objective    Vitals - Patient Reported  Pain Score: No Pain (1)        Physical Exam   GENERAL: alert and no distress  EYES: Eyes grossly normal to inspection.  No discharge or erythema, or obvious scleral/conjunctival abnormalities.  RESP: No audible wheeze, cough, or visible cyanosis.    SKIN: Hammertoe of the right big toe noted  There is skin breakdown anteriorly with some discharge and redness and swelling  NEURO: Cranial nerves grossly intact.  Mentation and speech appropriate for age.  PSYCH: Appropriate affect, tone, and pace of words          Video-Visit Details    Type of service:  Video Visit   Originating Location (pt. Location): Home    Distant Location (provider location):  Off-site  Platform used for Video Visit: [facility]  Signed Electronically by: Trevor Ríos MD

## 2024-10-29 ENCOUNTER — ANCILLARY PROCEDURE (OUTPATIENT)
Dept: GENERAL RADIOLOGY | Facility: CLINIC | Age: 59
End: 2024-10-29
Attending: INTERNAL MEDICINE
Payer: COMMERCIAL

## 2024-10-29 DIAGNOSIS — M20.41 HAMMERTOE OF RIGHT FOOT: ICD-10-CM

## 2024-10-29 PROCEDURE — 73660 X-RAY EXAM OF TOE(S): CPT | Mod: RT | Performed by: RADIOLOGY

## 2024-11-04 ENCOUNTER — TELEPHONE (OUTPATIENT)
Dept: PODIATRY | Facility: CLINIC | Age: 59
End: 2024-11-04

## 2024-11-04 ENCOUNTER — OFFICE VISIT (OUTPATIENT)
Dept: PODIATRY | Facility: CLINIC | Age: 59
End: 2024-11-04
Attending: INTERNAL MEDICINE
Payer: COMMERCIAL

## 2024-11-04 ENCOUNTER — MYC MEDICAL ADVICE (OUTPATIENT)
Dept: PODIATRY | Facility: CLINIC | Age: 59
End: 2024-11-04

## 2024-11-04 ENCOUNTER — OFFICE VISIT (OUTPATIENT)
Dept: URGENT CARE | Facility: URGENT CARE | Age: 59
End: 2024-11-04
Payer: COMMERCIAL

## 2024-11-04 VITALS
OXYGEN SATURATION: 97 % | SYSTOLIC BLOOD PRESSURE: 127 MMHG | HEART RATE: 66 BPM | DIASTOLIC BLOOD PRESSURE: 83 MMHG | TEMPERATURE: 97.8 F

## 2024-11-04 DIAGNOSIS — L97.522 SKIN ULCER OF LEFT GREAT TOE WITH FAT LAYER EXPOSED (H): Primary | ICD-10-CM

## 2024-11-04 DIAGNOSIS — L03.031 CELLULITIS OF RIGHT TOE: ICD-10-CM

## 2024-11-04 DIAGNOSIS — S99.921A INJURY OF TOE ON RIGHT FOOT, INITIAL ENCOUNTER: Primary | ICD-10-CM

## 2024-11-04 DIAGNOSIS — M20.41 HAMMERTOE OF RIGHT FOOT: ICD-10-CM

## 2024-11-04 PROCEDURE — 99213 OFFICE O/P EST LOW 20 MIN: CPT | Performed by: PHYSICIAN ASSISTANT

## 2024-11-04 PROCEDURE — 99212 OFFICE O/P EST SF 10 MIN: CPT | Mod: 25 | Performed by: PODIATRIST

## 2024-11-04 PROCEDURE — 11042 DBRDMT SUBQ TIS 1ST 20SQCM/<: CPT | Performed by: PODIATRIST

## 2024-11-04 NOTE — NURSING NOTE
DME FITTING    Relevant Diagnosis: skin ulcer of left great toe  Surgical shoe was fit on patient's Left foot.     Person(s) involved in teaching:   Patient    Brace was applied in standard Manner:  Yes  Brace fit well:  Yes  Patient reports brace to fit comfortably:  Yes    Education:   Patient shown self application and removal of brace: Yes  Patient shown how to adjust brace fit, if necessary: Yes  Patient educated on billing and return policy: Yes  Patient confirmed understanding when and how to contact clinic with concerns: Yes

## 2024-11-04 NOTE — TELEPHONE ENCOUNTER
Patient called in and mentioned that when he got home the gauze and his sock was soaked through with blood. He mentioned that he placed pressure on it for 30 minutes and it still bleeding. I explained that Dr. Rowan has left for the day. I will send a message over to him and I will MyChart any suggestions that he provides to stop the bleeding. It was also mentioned that he should go to urgent care or the Emergency Room to get his toe looked at and treated. The patient asked if he should put pressure on it again to see if he can get it to stop bleeding. It was mentioned that he can try this and that if his toe does not stop bleeding he needs to go urgent care or emergency room, because we want to get that bleeding stopped. The patient verbally understood.

## 2024-11-04 NOTE — LETTER
2024      Markell Garcia  [address] [address]      Dear Colleague,    Thank you for referring your patient, Markell Garcia, to the [facility]. Please see a copy of my visit note below.    Past Medical History:   Diagnosis Date    ED (erectile dysfunction)     Gastroesophageal reflux disease without esophagitis     Hammertoe of right foot     Hypercholesteremia     Neuropathy      Patient Active Problem List   Diagnosis    Neuropathy    Hypercholesteremia    Hammertoe of right foot    ED (erectile dysfunction)    Gastroesophageal reflux disease without esophagitis    Pain of left lower leg     Past Surgical History:   Procedure Laterality Date    DISCECTOMY LUMBAR MINIMALLY INVASIVE ONE LEVEL      ROTATOR CUFF REPAIR RT/LT Left     ROTATOR CUFF REPAIR RT/LT Right      Social History     Socioeconomic History    Marital status:      Spouse name: Not on file    Number of children: Not on file    Years of education: Not on file    Highest education level: Not on file   Occupational History    Not on file   Tobacco Use    Smoking status: Never    Smokeless tobacco: Never   Vaping Use    Vaping status: Never Used   Substance and Sexual Activity    Alcohol use: Yes     Comment: Once a week, 2-3 beers at a time    Drug use: Never    Sexual activity: Yes     Partners: Female     Birth control/protection: Post-menopausal     Comment: exclusive with partner   Other Topics Concern    Not on file   Social History Narrative    Moved to [place]  from [place]    Lives with wife    Works in supply chain distribution, works remotely and travels frequently    One brother in [place], one in [place], [place]     Daughter is local            First wife  in  of colon cancer      Social Drivers of Health     Financial Resource Strain: Low Risk  (2023)    Financial Resource Strain     Within the past 12 months, have you or your family members you live with  been unable to get utilities (heat, electricity) when it was really needed?: No   Food Insecurity: Low Risk  (9/24/2023)    Food Insecurity     Within the past 12 months, did you worry that your food would run out before you got money to buy more?: No     Within the past 12 months, did the food you bought just not last and you didn t have money to get more?: No   Transportation Needs: Low Risk  (9/24/2023)    Transportation Needs     Within the past 12 months, has lack of transportation kept you from medical appointments, getting your medicines, non-medical meetings or appointments, work, or from getting things that you need?: No   Physical Activity: Not on file   Stress: Not on file   Social Connections: Not on file   Interpersonal Safety: Low Risk  (10/21/2024)    Interpersonal Safety     Do you feel physically and emotionally safe where you currently live?: Yes     Within the past 12 months, have you been hit, slapped, kicked or otherwise physically hurt by someone?: No     Within the past 12 months, have you been humiliated or emotionally abused in other ways by your partner or ex-partner?: No   Housing Stability: Low Risk  (9/24/2023)    Housing Stability     Do you have housing? : Yes     Are you worried about losing your housing?: No     Family History   Problem Relation Age of Onset    Hammer toes Mother     Seizure Disorder Mother     Hyperlipidemia Father     Insulin Resistance Father     Hyperlipidemia Paternal Grandfather     Diabetes Type 2  Paternal Uncle     Peripheral Neuropathy Paternal Uncle     Coronary Artery Disease No family hx of     Cerebrovascular Disease No family hx of     Prostate Cancer No family hx of     Colon Cancer No family hx of                  Subjective findings- 59-year-old presents from Trevor Ríos MD for Cellulitis with Hammertoe right foot, I reviewed Dr. Duarte, urgent care and family practice previous notes.  He relates he  has a Hammertoe which he seen podiatry for in  the past and they have talked about during surgery but did not wanted to do that, relates he has had recurrent ulcers on the toe this is the fourth time has had a blister on it in about 3 to 4 years, relates was on an antibiotic and then was just put on another antibiotic Doxycycline which she is taking with no problems, relates to no injury but was in [place] doing a lot of walking in October, jason has not used foot orthotics, jason has used gel toe pads and is currently using a buttress pad that has helped some, relates no specific injuries, relates he does have peripheral neuropathy.    Objective findings- DP and PT are 2 out of 4 right.  Has dorsally contracted digits 1 through 5 right.  Has a right distal second toe hyperkeratotic tissue buildup.  Has a right distal Hallux blister with underlying ulcer that is through the Dermis into the subcutaneous tissues, positive edema, no erythema, serosanguineous drainage, no odor, no calor, no pain on palpation.  Has dystrophic thickened brittle hallux nail and second toenail with subungual debris dystrophy and discoloration.  10/29/2024 x-rays right foot reviewed report.  X-ray findings with joint and cortical margins intact, outline of toenail noted, outline of ulcer noted, dorsally contracted digits noted.    Assessment and plan- Ulcer right Hallux secondary to blistering, Hammertoes.  Peripheral sensory neuropathy.  Diagnosis and treatment options discussed with the patient.  The right Hallux ulcer was sharp debrided through the Dermis into the subcutaneous tissues with a tissue cutter, no anesthesia needed, and local wound care done upon consent with cleaning this with wound Vashe and applying Biatain silver and a sterile dressing.  The ulcer bled well upon debridement.  Will have him daily clean this with wound Vashe, apply Biatain silver and wrap with sterile Bindu wrap.  Surgical shoe dispensed use discussed with him.  Continue the toe buttress pad.  I  discussed with him offloading, he opted for no rollabout.  Referral to orthopedic surgery for any surgical options given and use discussed with the patient.  Continue the Doxycycline as directed.  No labs today.  Previous notes reviewed.  Return to clinic and see me in 1 week.          Moderate to high level of medical decision making.      Again, thank you for allowing me to participate in the care of your patient.        Sincerely,      Ari Rowan DPM

## 2024-11-04 NOTE — NURSING NOTE
Markell Garcia's chief complaint for this visit includes:  Chief Complaint   Patient presents with    Consult     Right foot, hammer toes, open sore      PCP: Haritha Barton    Referring Provider:  Trevor Ríos MD  [address]  [place]  [place] [address]    There were no vitals taken for this visit.  Data Unavailable     Do you need any medication refills at today's visit? NO    No Known Allergies    Palak Clemons LPN

## 2024-11-04 NOTE — PROGRESS NOTES
Past Medical History:   Diagnosis Date    ED (erectile dysfunction)     Gastroesophageal reflux disease without esophagitis     Hammertoe of right foot     Hypercholesteremia     Neuropathy      Patient Active Problem List   Diagnosis    Neuropathy    Hypercholesteremia    Hammertoe of right foot    ED (erectile dysfunction)    Gastroesophageal reflux disease without esophagitis    Pain of left lower leg     Past Surgical History:   Procedure Laterality Date    DISCECTOMY LUMBAR MINIMALLY INVASIVE ONE LEVEL      ROTATOR CUFF REPAIR RT/LT Left     ROTATOR CUFF REPAIR RT/LT Right      Social History     Socioeconomic History    Marital status:      Spouse name: Not on file    Number of children: Not on file    Years of education: Not on file    Highest education level: Not on file   Occupational History    Not on file   Tobacco Use    Smoking status: Never    Smokeless tobacco: Never   Vaping Use    Vaping status: Never Used   Substance and Sexual Activity    Alcohol use: Yes     Comment: Once a week, 2-3 beers at a time    Drug use: Never    Sexual activity: Yes     Partners: Female     Birth control/protection: Post-menopausal     Comment: exclusive with partner   Other Topics Concern    Not on file   Social History Narrative    Moved to [place]  from [place]    Lives with wife    Works in supply chain distribution, works remotely and travels frequently    One brother in [place], one in [place], [place]     Daughter is local            First wife  in  of colon cancer      Social Drivers of Health     Financial Resource Strain: Low Risk  (2023)    Financial Resource Strain     Within the past 12 months, have you or your family members you live with been unable to get utilities (heat, electricity) when it was really needed?: No   Food Insecurity: Low Risk  (2023)    Food Insecurity     Within the past 12 months, did you worry that your food would run out before you got money to buy  more?: No     Within the past 12 months, did the food you bought just not last and you didn t have money to get more?: No   Transportation Needs: Low Risk  (9/24/2023)    Transportation Needs     Within the past 12 months, has lack of transportation kept you from medical appointments, getting your medicines, non-medical meetings or appointments, work, or from getting things that you need?: No   Physical Activity: Not on file   Stress: Not on file   Social Connections: Not on file   Interpersonal Safety: Low Risk  (10/21/2024)    Interpersonal Safety     Do you feel physically and emotionally safe where you currently live?: Yes     Within the past 12 months, have you been hit, slapped, kicked or otherwise physically hurt by someone?: No     Within the past 12 months, have you been humiliated or emotionally abused in other ways by your partner or ex-partner?: No   Housing Stability: Low Risk  (9/24/2023)    Housing Stability     Do you have housing? : Yes     Are you worried about losing your housing?: No     Family History   Problem Relation Age of Onset    Hammer toes Mother     Seizure Disorder Mother     Hyperlipidemia Father     Insulin Resistance Father     Hyperlipidemia Paternal Grandfather     Diabetes Type 2  Paternal Uncle     Peripheral Neuropathy Paternal Uncle     Coronary Artery Disease No family hx of     Cerebrovascular Disease No family hx of     Prostate Cancer No family hx of     Colon Cancer No family hx of                          Subjective findings- 59-year-old presents from Trevor Ríos MD for Cellulitis with Hammertoe right foot, I reviewed Dr. Duarte, urgent care and family practice previous notes.  He relates he  has a Hammertoe which he seen podiatry for in the past and they have talked about during surgery but did not wanted to do that, relates he has had recurrent ulcers on the toe this is the fourth time has had a blister on it in about 3 to 4 years, relates was on an antibiotic and  then was just put on another antibiotic Doxycycline which she is taking with no problems, relates to no injury but was in [place] doing a lot of walking in October, relates has not used foot orthotics, relates has used gel toe pads and is currently using a buttress pad that has helped some, relates no specific injuries, relates he does have peripheral neuropathy.    Objective findings- DP and PT are 2 out of 4 right.  Has dorsally contracted digits 1 through 5 right.  Has a right distal second toe hyperkeratotic tissue buildup.  Has a right distal Hallux blister with underlying ulcer that is through the Dermis into the subcutaneous tissues, positive edema, no erythema, serosanguineous drainage, no odor, no calor, no pain on palpation.  Has dystrophic thickened brittle hallux nail and second toenail with subungual debris dystrophy and discoloration.  10/29/2024 x-rays right foot reviewed report.  X-ray findings with joint and cortical margins intact, outline of toenail noted, outline of ulcer noted, dorsally contracted digits noted.    Assessment and plan- Ulcer right Hallux secondary to blistering, Hammertoes.  Peripheral sensory neuropathy.  Diagnosis and treatment options discussed with the patient.  The right Hallux ulcer was sharp debrided through the Dermis into the subcutaneous tissues with a tissue cutter, no anesthesia needed, and local wound care done upon consent with cleaning this with wound Vashe and applying Biatain silver and a sterile dressing.  The ulcer bled well upon debridement.  Will have him daily clean this with wound Vashe, apply Biatain silver and wrap with sterile Bindu wrap.  Surgical shoe dispensed use discussed with him.  Continue the toe buttress pad.  I discussed with him offloading, he opted for no rollabout.  Referral to orthopedic surgery for any surgical options given and use discussed with the patient.  Continue the Doxycycline as directed.  No labs today.  Previous notes reviewed.   Return to clinic and see me in 1 week.                                                                      Moderate to high level of medical decision making.

## 2024-11-05 ENCOUNTER — PATIENT OUTREACH (OUTPATIENT)
Dept: CARE COORDINATION | Facility: CLINIC | Age: 59
End: 2024-11-05

## 2024-11-05 ENCOUNTER — OFFICE VISIT (OUTPATIENT)
Dept: PODIATRY | Facility: CLINIC | Age: 59
End: 2024-11-05
Payer: COMMERCIAL

## 2024-11-05 DIAGNOSIS — M79.671 FOOT PAIN, RIGHT: ICD-10-CM

## 2024-11-05 DIAGNOSIS — L03.031 CELLULITIS OF RIGHT TOE: ICD-10-CM

## 2024-11-05 DIAGNOSIS — L97.522 SKIN ULCER OF LEFT GREAT TOE WITH FAT LAYER EXPOSED (H): ICD-10-CM

## 2024-11-05 DIAGNOSIS — M20.41 HAMMERTOE OF RIGHT FOOT: Primary | ICD-10-CM

## 2024-11-05 PROCEDURE — 99214 OFFICE O/P EST MOD 30 MIN: CPT | Performed by: PODIATRIST

## 2024-11-05 RX ORDER — HYDROCODONE BITARTRATE AND ACETAMINOPHEN 5; 325 MG/1; MG/1
1 TABLET ORAL EVERY 6 HOURS PRN
Qty: 10 TABLET | Refills: 0 | Status: SHIPPED | OUTPATIENT
Start: 2024-11-05 | End: 2024-11-08

## 2024-11-05 NOTE — NURSING NOTE
DME FITTING    Relevant Diagnosis: ulcer on the right toe  DH 2 shoe was fit on patient's Right foot.     Person(s) involved in teaching:   Patient    Brace was applied in standard Manner:  Yes  Brace fit well:  Yes  Patient reports brace to fit comfortably:  Yes    Education:   Patient shown self application and removal of brace: Yes  Patient shown how to adjust brace fit, if necessary: Yes  Patient educated on billing and return policy: Yes  Patient confirmed understanding when and how to contact clinic with concerns: Yes

## 2024-11-05 NOTE — PROGRESS NOTES
Past Medical History:   Diagnosis Date    ED (erectile dysfunction)     Gastroesophageal reflux disease without esophagitis     Hammertoe of right foot     Hypercholesteremia     Neuropathy      Patient Active Problem List   Diagnosis    Neuropathy    Hypercholesteremia    Hammertoe of right foot    ED (erectile dysfunction)    Gastroesophageal reflux disease without esophagitis    Pain of left lower leg     Past Surgical History:   Procedure Laterality Date    DISCECTOMY LUMBAR MINIMALLY INVASIVE ONE LEVEL      ROTATOR CUFF REPAIR RT/LT Left     ROTATOR CUFF REPAIR RT/LT Right      Social History     Socioeconomic History    Marital status:      Spouse name: Not on file    Number of children: Not on file    Years of education: Not on file    Highest education level: Not on file   Occupational History    Not on file   Tobacco Use    Smoking status: Never    Smokeless tobacco: Never   Vaping Use    Vaping status: Never Used   Substance and Sexual Activity    Alcohol use: Yes     Comment: Once a week, 2-3 beers at a time    Drug use: Never    Sexual activity: Yes     Partners: Female     Birth control/protection: Post-menopausal     Comment: exclusive with partner   Other Topics Concern    Not on file   Social History Narrative    Moved to [place]  from [place]    Lives with wife    Works in supply chain distribution, works remotely and travels frequently    One brother in [place], one in [place], [place]     Daughter is local            First wife  in  of colon cancer      Social Drivers of Health     Financial Resource Strain: Low Risk  (2023)    Financial Resource Strain     Within the past 12 months, have you or your family members you live with been unable to get utilities (heat, electricity) when it was really needed?: No   Food Insecurity: Low Risk  (2023)    Food Insecurity     Within the past 12 months, did you worry that your food would run out before you got money to buy  more?: No     Within the past 12 months, did the food you bought just not last and you didn t have money to get more?: No   Transportation Needs: Low Risk  (9/24/2023)    Transportation Needs     Within the past 12 months, has lack of transportation kept you from medical appointments, getting your medicines, non-medical meetings or appointments, work, or from getting things that you need?: No   Physical Activity: Not on file   Stress: Not on file   Social Connections: Not on file   Interpersonal Safety: Low Risk  (10/21/2024)    Interpersonal Safety     Do you feel physically and emotionally safe where you currently live?: Yes     Within the past 12 months, have you been hit, slapped, kicked or otherwise physically hurt by someone?: No     Within the past 12 months, have you been humiliated or emotionally abused in other ways by your partner or ex-partner?: No   Housing Stability: Low Risk  (9/24/2023)    Housing Stability     Do you have housing? : Yes     Are you worried about losing your housing?: No     Family History   Problem Relation Age of Onset    Hammer toes Mother     Seizure Disorder Mother     Hyperlipidemia Father     Insulin Resistance Father     Hyperlipidemia Paternal Grandfather     Diabetes Type 2  Paternal Uncle     Peripheral Neuropathy Paternal Uncle     Coronary Artery Disease No family hx of     Cerebrovascular Disease No family hx of     Prostate Cancer No family hx of     Colon Cancer No family hx of                            Subjective findings- 59-year-old returns clinic for ulcer right hallux with hammertoe and peripheral sensory neuropathy.  Relates he went to urgent care yesterday, reviewed reviewed urgent cares 11/4/2024 note.  Relates he had quite a bit of pain in the toe last night, relates no specific injury, relates he been staying off of it, relates it bled through the dressing they put on an urgent care.    Objective findings- Vascular status intact right.  Has drainage through  the dressing.  Has a distal right hallux ulceration is through the dermis into the subcutaneous tissues with Surgifoam intact, minimal active bleeding, decreased edema, mild maceration, no erythema, minimal venous congestion, no odor, no calor, hammertoe present.    Assessment and plan- Ulcer right Hallux secondary to blistering, Hammertoe.  He is getting pain in the toe.  Peripheral sensory neuropathy.  Diagnosis and treatment options discussed with the patient.  We cleaned the ulcer with wound Vashe applied Betadine soaked Adaptic and a sterile compression dressing today upon consent and use discussed with him.  Will have him keep the dressing dry and intact.  Checked his surgical shoe at this appears to fit okay but he may be sliding forward off the end of that so we dispensed a DH 2 shoe which is a little bit longer and has an ankle strap and use discussed with him.  Continue the Doxycycline.  Prescription for Vicodin given and use discussed with him.  Advised he can try Tylenol and if that does not work he can use the Vicodin as needed.  I did advise him that I did not want him using NSAIDs because this is already bleeding.  Previous notes reviewed.  Return to clinic Thursday as scheduled.                                                                            Moderate level of medical decision making.

## 2024-11-05 NOTE — LETTER
2024      Markell Garcia  [address] [address]      Dear Colleague,    Thank you for referring your patient, Markell Garcia, to the [facility]. Please see a copy of my visit note below.    Past Medical History:   Diagnosis Date    ED (erectile dysfunction)     Gastroesophageal reflux disease without esophagitis     Hammertoe of right foot     Hypercholesteremia     Neuropathy      Patient Active Problem List   Diagnosis    Neuropathy    Hypercholesteremia    Hammertoe of right foot    ED (erectile dysfunction)    Gastroesophageal reflux disease without esophagitis    Pain of left lower leg     Past Surgical History:   Procedure Laterality Date    DISCECTOMY LUMBAR MINIMALLY INVASIVE ONE LEVEL      ROTATOR CUFF REPAIR RT/LT Left     ROTATOR CUFF REPAIR RT/LT Right      Social History     Socioeconomic History    Marital status:      Spouse name: Not on file    Number of children: Not on file    Years of education: Not on file    Highest education level: Not on file   Occupational History    Not on file   Tobacco Use    Smoking status: Never    Smokeless tobacco: Never   Vaping Use    Vaping status: Never Used   Substance and Sexual Activity    Alcohol use: Yes     Comment: Once a week, 2-3 beers at a time    Drug use: Never    Sexual activity: Yes     Partners: Female     Birth control/protection: Post-menopausal     Comment: exclusive with partner   Other Topics Concern    Not on file   Social History Narrative    Moved to [place]  from [place]    Lives with wife    Works in supply chain distribution, works remotely and travels frequently    One brother in [place], one in [place], [place]     Daughter is local            First wife  in  of colon cancer      Social Drivers of Health     Financial Resource Strain: Low Risk  (2023)    Financial Resource Strain     Within the past 12 months, have you or your family members you live with  been unable to get utilities (heat, electricity) when it was really needed?: No   Food Insecurity: Low Risk  (9/24/2023)    Food Insecurity     Within the past 12 months, did you worry that your food would run out before you got money to buy more?: No     Within the past 12 months, did the food you bought just not last and you didn t have money to get more?: No   Transportation Needs: Low Risk  (9/24/2023)    Transportation Needs     Within the past 12 months, has lack of transportation kept you from medical appointments, getting your medicines, non-medical meetings or appointments, work, or from getting things that you need?: No   Physical Activity: Not on file   Stress: Not on file   Social Connections: Not on file   Interpersonal Safety: Low Risk  (10/21/2024)    Interpersonal Safety     Do you feel physically and emotionally safe where you currently live?: Yes     Within the past 12 months, have you been hit, slapped, kicked or otherwise physically hurt by someone?: No     Within the past 12 months, have you been humiliated or emotionally abused in other ways by your partner or ex-partner?: No   Housing Stability: Low Risk  (9/24/2023)    Housing Stability     Do you have housing? : Yes     Are you worried about losing your housing?: No     Family History   Problem Relation Age of Onset    Hammer toes Mother     Seizure Disorder Mother     Hyperlipidemia Father     Insulin Resistance Father     Hyperlipidemia Paternal Grandfather     Diabetes Type 2  Paternal Uncle     Peripheral Neuropathy Paternal Uncle     Coronary Artery Disease No family hx of     Cerebrovascular Disease No family hx of     Prostate Cancer No family hx of     Colon Cancer No family hx of                            Subjective findings- 59-year-old returns clinic for ulcer right hallux with hammertoe and peripheral sensory neuropathy.  Relates he went to urgent care yesterday, reviewed reviewed urgent cares 11/4/2024 note.  Relates he had  quite a bit of pain in the toe last night, relates no specific injury, relates he been staying off of it, relates it bled through the dressing they put on an urgent care.    Objective findings- Vascular status intact right.  Has drainage through the dressing.  Has a distal right hallux ulceration is through the dermis into the subcutaneous tissues with Surgifoam intact, minimal active bleeding, decreased edema, mild maceration, no erythema, minimal venous congestion, no odor, no calor, hammertoe present.    Assessment and plan- Ulcer right Hallux secondary to blistering, Hammertoe.  He is getting pain in the toe.  Peripheral sensory neuropathy.  Diagnosis and treatment options discussed with the patient.  We cleaned the ulcer with wound Vashe applied Betadine soaked Adaptic and a sterile compression dressing today upon consent and use discussed with him.  Will have him keep the dressing dry and intact.  Checked his surgical shoe at this appears to fit okay but he may be sliding forward off the end of that so we dispensed a DH 2 shoe which is a little bit longer and has an ankle strap and use discussed with him.  Continue the Doxycycline.  Prescription for Vicodin given and use discussed with him.  Advised he can try Tylenol and if that does not work he can use the Vicodin as needed.  I did advise him that I did not want him using NSAIDs because this is already bleeding.  Previous notes reviewed.  Return to clinic Thursday as scheduled.            Moderate level of medical decision making.      Again, thank you for allowing me to participate in the care of your patient.        Sincerely,        Ari Rowan DPM

## 2024-11-05 NOTE — PROGRESS NOTES
Injury of toe on right foot, initial encounter  Surgifoam was pressed against the patient's wound until adhered.  I then placed a nonadherent Telfa dressing together with a Coban compression wrap.  I would like the patient to keep this in place for the next 48 hours and then carefully swap out the Telfa dressing and Coban wrap for a different dressing.  Patient was educated on symptoms to look out for for signs of worsening infection.  If the bleeding persists beyond the next 48 hours, I would like the patient to be evaluated in the emergency department for further evaluation.     Misael Landon PA-C  [facility]    Subjective   59 year old who presents to clinic today for the following health issues:    Toe Pain       HPI     Patient visits today after visiting podiatry this morning.  He had a large callus on the side of his right great toe (hammertoe) shaved off.  Since that procedure the patient has had constant bleeding.  He states that the blood completely soaked through his previous bandage.  Patient also states that he is residential through a 10-day course of doxycycline for an infection in this toe which seems to be slowly resolving.  He denies any recent purulent discharge, fever, chills, fatigue, or bodyaches.  Patient is mostly concerned about the bleeding not stopping.  He denies being on any current blood thinners.    Review of Systems   Review of Systems   See HPI    Objective    Temp: 97.8  F (36.6  C) Temp src: Temporal BP: 127/83 Pulse: 66     SpO2: 97 %       Physical Exam   Physical Exam  Constitutional:       General: He is not in acute distress.     Appearance: Normal appearance. He is normal weight. He is not ill-appearing, toxic-appearing or diaphoretic.   HENT:      Head: Normocephalic and atraumatic.   Cardiovascular:      Rate and Rhythm: Normal rate.      Pulses: Normal pulses.   Pulmonary:      Effort: Pulmonary effort is normal. No respiratory distress.   Feet:       Comments: The patient's right great toe is oozing blood at the time of visit.  There does not seem to be any spurting blood present.  There does not seem to be much erythema, warmth, or tenderness of the toe or the toe base.  There is no purulent discharge noted.  The patient's callus has mostly been removed.  Neurological:      General: No focal deficit present.      Mental Status: He is alert and oriented to person, place, and time. Mental status is at baseline.      Gait: Gait normal.   Psychiatric:         Mood and Affect: Mood normal.         Behavior: Behavior normal.         Thought Content: Thought content normal.         Judgment: Judgment normal.          No results found for this or any previous visit (from the past 24 hours).

## 2024-11-05 NOTE — NURSING NOTE
Markell Garcia's chief complaint for this visit includes:  Chief Complaint   Patient presents with    Consult     Toe recheck and wound care     PCP: Haritha Barton    Referring Provider:  Referred Self, MD  No address on file    There were no vitals taken for this visit.  Data Unavailable     Do you need any medication refills at today's visit? NO    No Known Allergies    Palak Clemons LPN

## 2024-11-07 ENCOUNTER — OFFICE VISIT (OUTPATIENT)
Dept: PODIATRY | Facility: CLINIC | Age: 59
End: 2024-11-07
Payer: COMMERCIAL

## 2024-11-07 DIAGNOSIS — L03.031 CELLULITIS OF RIGHT TOE: ICD-10-CM

## 2024-11-07 DIAGNOSIS — L97.522 SKIN ULCER OF LEFT GREAT TOE WITH FAT LAYER EXPOSED (H): ICD-10-CM

## 2024-11-07 DIAGNOSIS — M20.41 HAMMERTOE OF RIGHT FOOT: Primary | ICD-10-CM

## 2024-11-07 PROCEDURE — 99213 OFFICE O/P EST LOW 20 MIN: CPT | Performed by: PODIATRIST

## 2024-11-07 RX ORDER — DOXYCYCLINE HYCLATE 100 MG
100 TABLET ORAL 2 TIMES DAILY
Qty: 28 TABLET | Refills: 0 | Status: SHIPPED | OUTPATIENT
Start: 2024-11-07

## 2024-11-07 NOTE — TELEPHONE ENCOUNTER
DIAGNOSIS:   Skin ulcer of left great toe with fat layer exposed (H) [L97.522]  - Primary  Hammertoe of right foot [M20.41]  Cellulitis of right toe [L03.031]   APPOINTMENT DATE: 11/08/2024   NOTES STATUS DETAILS   OFFICE NOTE from referring provider Internal 11/05/2024 - Ari Rowan DPM- [facility]   OFFICE NOTE from other specialist Internal    Care Everywhere 11/04/2024  [facility]    11/06/2018 - Liza Banks MD - [facility]   XRAYS (IMAGES & REPORTS)  [facility]:  10/29/2024 - RT Toe    [facility]:  IMAGING N/A  10/25/2018 - RT Toe

## 2024-11-07 NOTE — LETTER
2024      Markell Garcia  [address] [address]      Dear Colleague,    Thank you for referring your patient, Markell Garcia, to the [facility]. Please see a copy of my visit note below.    Past Medical History:   Diagnosis Date    ED (erectile dysfunction)     Gastroesophageal reflux disease without esophagitis     Hammertoe of right foot     Hypercholesteremia     Neuropathy      Patient Active Problem List   Diagnosis    Neuropathy    Hypercholesteremia    Hammertoe of right foot    ED (erectile dysfunction)    Gastroesophageal reflux disease without esophagitis    Pain of left lower leg     Past Surgical History:   Procedure Laterality Date    DISCECTOMY LUMBAR MINIMALLY INVASIVE ONE LEVEL      ROTATOR CUFF REPAIR RT/LT Left     ROTATOR CUFF REPAIR RT/LT Right      Social History     Socioeconomic History    Marital status:      Spouse name: Not on file    Number of children: Not on file    Years of education: Not on file    Highest education level: Not on file   Occupational History    Not on file   Tobacco Use    Smoking status: Never    Smokeless tobacco: Never   Vaping Use    Vaping status: Never Used   Substance and Sexual Activity    Alcohol use: Yes     Comment: Once a week, 2-3 beers at a time    Drug use: Never    Sexual activity: Yes     Partners: Female     Birth control/protection: Post-menopausal     Comment: exclusive with partner   Other Topics Concern    Not on file   Social History Narrative    Moved to [place]  from [place]    Lives with wife    Works in supply chain distribution, works remotely and travels frequently    One brother in [place], one in [place], [place]     Daughter is local            First wife  in  of colon cancer      Social Drivers of Health     Financial Resource Strain: Low Risk  (2023)    Financial Resource Strain     Within the past 12 months, have you or your family members you live with  been unable to get utilities (heat, electricity) when it was really needed?: No   Food Insecurity: Low Risk  (9/24/2023)    Food Insecurity     Within the past 12 months, did you worry that your food would run out before you got money to buy more?: No     Within the past 12 months, did the food you bought just not last and you didn t have money to get more?: No   Transportation Needs: Low Risk  (9/24/2023)    Transportation Needs     Within the past 12 months, has lack of transportation kept you from medical appointments, getting your medicines, non-medical meetings or appointments, work, or from getting things that you need?: No   Physical Activity: Not on file   Stress: Not on file   Social Connections: Not on file   Interpersonal Safety: Low Risk  (10/21/2024)    Interpersonal Safety     Do you feel physically and emotionally safe where you currently live?: Yes     Within the past 12 months, have you been hit, slapped, kicked or otherwise physically hurt by someone?: No     Within the past 12 months, have you been humiliated or emotionally abused in other ways by your partner or ex-partner?: No   Housing Stability: Low Risk  (9/24/2023)    Housing Stability     Do you have housing? : Yes     Are you worried about losing your housing?: No     Family History   Problem Relation Age of Onset    Hammer toes Mother     Seizure Disorder Mother     Hyperlipidemia Father     Insulin Resistance Father     Hyperlipidemia Paternal Grandfather     Diabetes Type 2  Paternal Uncle     Peripheral Neuropathy Paternal Uncle     Coronary Artery Disease No family hx of     Cerebrovascular Disease No family hx of     Prostate Cancer No family hx of     Colon Cancer No family hx of          Lab Results   Component Value Date    A1C 5.7 09/25/2023    A1C 5.8 01/27/2023                       Subjective findings- 59-year-old returns clinic for ulcer right hallux with peripheral sensory neuropathy.  Relates is doing much better, relates  the pain is resolved, relates the DH 2 shoe feels better than the surgical shoe, relates to no drainage through the dressing, no problems with the Doxycycline he took his last once today, he relates he has an appointment with Dr. Ceja in Orthopedics on Monday..    Objective findings- Vascular status intact right.  Has a right distal hallux ulcer is through the dermis into the subcutaneous tissues with granulation tissue, serosanguineous drainage, decreased erythema, mild edema, no odor, no calor, no pain on palpation.    Assessment and plan- Ulcer right Hallux secondary to blistering, Hammertoe, peripheral sensory Neuropathy.  Diagnosis and treatment options discussed with the patient.  We cleaned the ulcer with ChloraPrep and applied Iodosorb and Biatain silver and wrapped with sterile gauze dressing today upon consent.  Will have him every other day and as needed for drainage clean this with wound Vashe, apply Iodosorb and Biatain silver and a sterile gauze dressing.  Continue the DH 2 shoe and offloading.  Refill for Doxycycline given and use discussed with them.  Previous notes reviewed.  Return to clinic and see me in 1 week.          Low to moderate level of medical decision making.      Again, thank you for allowing me to participate in the care of your patient.        Sincerely,        Ari Rowan DPM

## 2024-11-07 NOTE — PROGRESS NOTES
Past Medical History:   Diagnosis Date    ED (erectile dysfunction)     Gastroesophageal reflux disease without esophagitis     Hammertoe of right foot     Hypercholesteremia     Neuropathy      Patient Active Problem List   Diagnosis    Neuropathy    Hypercholesteremia    Hammertoe of right foot    ED (erectile dysfunction)    Gastroesophageal reflux disease without esophagitis    Pain of left lower leg     Past Surgical History:   Procedure Laterality Date    DISCECTOMY LUMBAR MINIMALLY INVASIVE ONE LEVEL      ROTATOR CUFF REPAIR RT/LT Left     ROTATOR CUFF REPAIR RT/LT Right      Social History     Socioeconomic History    Marital status:      Spouse name: Not on file    Number of children: Not on file    Years of education: Not on file    Highest education level: Not on file   Occupational History    Not on file   Tobacco Use    Smoking status: Never    Smokeless tobacco: Never   Vaping Use    Vaping status: Never Used   Substance and Sexual Activity    Alcohol use: Yes     Comment: Once a week, 2-3 beers at a time    Drug use: Never    Sexual activity: Yes     Partners: Female     Birth control/protection: Post-menopausal     Comment: exclusive with partner   Other Topics Concern    Not on file   Social History Narrative    Moved to [place]  from [place]    Lives with wife    Works in supply chain distribution, works remotely and travels frequently    One brother in [place], one in [place], [place]     Daughter is local            First wife  in  of colon cancer      Social Drivers of Health     Financial Resource Strain: Low Risk  (2023)    Financial Resource Strain     Within the past 12 months, have you or your family members you live with been unable to get utilities (heat, electricity) when it was really needed?: No   Food Insecurity: Low Risk  (2023)    Food Insecurity     Within the past 12 months, did you worry that your food would run out before you got money to buy  more?: No     Within the past 12 months, did the food you bought just not last and you didn t have money to get more?: No   Transportation Needs: Low Risk  (9/24/2023)    Transportation Needs     Within the past 12 months, has lack of transportation kept you from medical appointments, getting your medicines, non-medical meetings or appointments, work, or from getting things that you need?: No   Physical Activity: Not on file   Stress: Not on file   Social Connections: Not on file   Interpersonal Safety: Low Risk  (10/21/2024)    Interpersonal Safety     Do you feel physically and emotionally safe where you currently live?: Yes     Within the past 12 months, have you been hit, slapped, kicked or otherwise physically hurt by someone?: No     Within the past 12 months, have you been humiliated or emotionally abused in other ways by your partner or ex-partner?: No   Housing Stability: Low Risk  (9/24/2023)    Housing Stability     Do you have housing? : Yes     Are you worried about losing your housing?: No     Family History   Problem Relation Age of Onset    Hammer toes Mother     Seizure Disorder Mother     Hyperlipidemia Father     Insulin Resistance Father     Hyperlipidemia Paternal Grandfather     Diabetes Type 2  Paternal Uncle     Peripheral Neuropathy Paternal Uncle     Coronary Artery Disease No family hx of     Cerebrovascular Disease No family hx of     Prostate Cancer No family hx of     Colon Cancer No family hx of          Lab Results   Component Value Date    A1C 5.7 09/25/2023    A1C 5.8 01/27/2023                       Subjective findings- 59-year-old returns clinic for ulcer right hallux with peripheral sensory neuropathy.  Relates is doing much better, relates the pain is resolved, relates the DH 2 shoe feels better than the surgical shoe, relates to no drainage through the dressing, no problems with the Doxycycline he took his last once today, he relates he has an appointment with Dr. Ceja in  Orthopedics on Monday..    Objective findings- Vascular status intact right.  Has a right distal hallux ulcer is through the dermis into the subcutaneous tissues with granulation tissue, serosanguineous drainage, decreased erythema, mild edema, no odor, no calor, no pain on palpation.    Assessment and plan- Ulcer right Hallux secondary to blistering, Hammertoe, peripheral sensory Neuropathy.  Diagnosis and treatment options discussed with the patient.  We cleaned the ulcer with ChloraPrep and applied Iodosorb and Biatain silver and wrapped with sterile gauze dressing today upon consent.  Will have him every other day and as needed for drainage clean this with wound Vashe, apply Iodosorb and Biatain silver and a sterile gauze dressing.  Continue the  2 shoe and offloading.  Refill for Doxycycline given and use discussed with them.  Previous notes reviewed.  Return to clinic and see me in 1 week.                                                                                              Low to moderate level of medical decision making.

## 2024-11-08 ENCOUNTER — PRE VISIT (OUTPATIENT)
Dept: ORTHOPEDICS | Facility: CLINIC | Age: 59
End: 2024-11-08

## 2024-11-08 ENCOUNTER — OFFICE VISIT (OUTPATIENT)
Dept: ORTHOPEDICS | Facility: CLINIC | Age: 59
End: 2024-11-08
Attending: PODIATRIST
Payer: COMMERCIAL

## 2024-11-08 VITALS — BODY MASS INDEX: 26.61 KG/M2 | HEIGHT: 78 IN | WEIGHT: 230 LBS

## 2024-11-08 DIAGNOSIS — M20.31 HALLUX MALLEUS OF RIGHT FOOT: Primary | ICD-10-CM

## 2024-11-08 DIAGNOSIS — L97.511 NEUROPATHIC ULCER OF RIGHT FOOT, LIMITED TO BREAKDOWN OF SKIN (H): ICD-10-CM

## 2024-11-08 PROCEDURE — 99213 OFFICE O/P EST LOW 20 MIN: CPT | Performed by: ORTHOPAEDIC SURGERY

## 2024-11-08 NOTE — PROGRESS NOTES
Orthopaedic Surgery Clinic - New Patient    Chief Complaint:  Right hallux wound.    History of Present Illness:    I saw this very pleasant 59-year-old gentleman with peripheral neuropathy without diabetes today, as new patient to me, at the request of Dr. Ari Rowan for evaluation and management of a right hallux hammertoe with an associated ulcer at the tip.  The patient reports a history of recurrent infections at this anatomic location over the years, starting about 10 years ago.  He has had about 4 episodes of recurrent infection since then.  His current and most recent infection appears to have developed some time before or during a recent trip to [place].  He felt some pain that he thought might have been an ingrown toenail, but then realized it was a recurrent infection.  MyChart photos of the right hallux from around 10/18/2024 are reviewed.  He has peripheral neuropathy in his feet and as such does not necessarily feel the wound or infection occur right away.  Prior to this event, the last 1 that he recalls was about 2 years ago.  He sought care by various providers including regular expert wound care with Dr. Rowan.  He has been on doxycycline which appears to be helping more than a previous antibiotic he was given.  He denies any fevers, chills, or night sweats.  He does not currently have significant pain.  He has been weightbearing as tolerated on the right foot in a postoperative shoe, and reports he is doing daily dressing changes.  He has also tried a silicone gel sleeve which keeps coming off, as well as a toe crest-type pad underneath the hallux to elevate the tip off of the weightbearing surface.  History is obtained from review of the chart as well as interview with the patient today.    Past Medical History:  Past Medical History:   Diagnosis Date    ED (erectile dysfunction)     Gastroesophageal reflux disease without esophagitis     Hammertoe of right foot     Hypercholesteremia      "Neuropathy      Past Surgical History:  Past Surgical History:   Procedure Laterality Date    DISCECTOMY LUMBAR MINIMALLY INVASIVE ONE LEVEL  2000    ROTATOR CUFF REPAIR RT/LT Left     ROTATOR CUFF REPAIR RT/LT Right      Social History:  Mr. Garcia reports that he lives at home with his wife, has friends/family nearby, does have reliable transportation to and from appointments, and that his hobbies include fishing, walking, and playing with his grandkids.  He denies tobacco/nicotine use, does drink occasional alcohol, and denies illicit drug use.    Allergies:  No Known Allergies    Medications:  Current Outpatient Medications   Medication Sig Dispense Refill    Ascorbic Acid (VITAMIN C PO)       Cholecalciferol (VITAMIN D3 PO) Take by mouth daily      Coenzyme Q10 (COQ10 PO)       doxycycline hyclate (VIBRA-TABS) 100 MG tablet Take 1 tablet (100 mg) by mouth 2 times daily. 28 tablet 0    doxycycline hyclate (VIBRA-TABS) 100 MG tablet Take 1 tablet (100 mg) by mouth 2 times daily. 20 tablet 0    fluocinonide (LIDEX) 0.05 % external solution Apply topically 2 times daily as needed (chest rash) 60 mL 0    FOLIC ACID PO Take 1 mg by mouth daily      Glucosamine HCl (GLUCOSAMINE PO)       Multiple Vitamins-Minerals (ZINC PO) Zinc      Omega-3 Fatty Acids (FISH OIL PO)       rosuvastatin (CRESTOR) 10 MG tablet Take 1 tablet (10 mg) by mouth daily. 90 tablet 3    sildenafil (VIAGRA) 100 MG tablet Take 1 tablet (100 mg) by mouth daily as needed (30-60 minutes before intercourse for ED) 30 tablet 11     Current Facility-Administered Medications   Medication Dose Route Frequency Provider Last Rate Last Admin    lidocaine (PF) (XYLOCAINE) 1 % injection 4 mL  4 mL      4 mL at 04/26/24 1152    triamcinolone (KENALOG-40) injection 40 mg  40 mg      40 mg at 04/26/24 1152     Exam:  Estimated body mass index is 26.88 kg/m  as calculated from the following:    Height as of this encounter: 1.97 m (6' 5.56\").    Weight as of " this encounter: 104.3 kg (230 lb).  Examination today shows the patient to be a pleasant, cooperative, awake, and alert adult sitting upright on the exam table in no acute distress.  He is nonseptic appearing from a general clinical standpoint.  A right foot postop shoe and right hallux crest pad have been removed for exam.  No other assistive gait devices are visible.  Breathing pattern is regular and nonlabored on room air.  The right foot is notable for a 17 x 14 mm transversely oriented oval partial-thickness open wound at the tip of the right hallux.  This is sterilely cleansed and does not appear to probe completely through the dermis.  I am unable to probe through the subcutaneous tissues or to bone.  I do not see subcutaneous fat exposed.  The hallux also appears to have a flexion contracture deformity at the IP joint which is partially but not fully passively correctable.  The hallux MTP joint appears to be supple.  The hallux toenail appears to have been partially removed.  There is no surrounding fluctuance.  There is minimal erythema around the hallux distal phalangeal area but not proximal to this.  There is no streaking lymphangitis up the right foot or ankle.  Light touch sensation is endorsed as diminished in the hallux.  There are 3/4 easily palpable right DP and PT pulses.  Hallux motor strength is 5/5 for EHL, EHB, FHL, and FHB.  There are no identifiable masses or fluctuance.  There is no expressible drainage.  There is some dried serosanguinous appearing drainage on the dressings that were changed.  A new sterile dressing is applied today.  There are also hammertoe deformities of the right second, third, fourth, and fifth toes, with the second appearing to be worst.  These appear to be passively correctible except for the second toe, which is partially though not fully correctable.    Imaging:  Independent review of imaging was performed including AP and oblique radiographs of the right foot and  a lateral radiograph of the right forefoot/hallux dated 10/29/2024.  Hammertoe deformity of right hallux and lesser toes.  No obvious fractures or dislocations.    Impression:  Very pleasant 59-year-old gentleman with recurrent right hallux ulceration and associated fixed right hallux hammertoe deformity with chronic recurrent ulceration, in the setting of peripheral neuropathy without known history of type 2 diabetes mellitus.    Plan:  The diagnosis, findings, suspected etiology and risk factors, and treatment options both nonsurgical and surgical were all discussed in layman's terms.  I discussed that the neuropathy and the fixed hallux hammertoe deformity both appear to play a role in the development of his recurrent ulceration at the tip of the right hallux.  Continue daily sterile dry dressing changes.  Consideration for possible MRI scan for nonimprovement of wound.  I also recommend surgery in the form of deformity correction and right hallux IP joint fusion to reduce the hammertoe deformity and lessen the chance for recurrent ulceration.  Ideally this would be done after healing of the wound, since the screw for a hallux IP joint fusion normally enters the toe through the area where the current infected open wound appears to be.  I discussed that sometimes however these types of wounds will not heal unless the deformity is first corrected.  I would recommend that this condition be followed closely.  The potential for worsening infection leading to the need for amputation was discussed.  Options for follow-up including phone call, video visit, MyChart, return face-to-face visit with me, or communicating through Dr. Rowan's visits were all discussed and offered.  All questions were answered.    Addendum:  The same day but after the encounter, I called the patient to discuss the radiology report for the 10/29/2024 right foot radiographs indicating that the cortical indistinctness was concerning for  osteomyelitis.  I recommend we obtain a right forefoot MRI scan to evaluate for osteomyelitis.  Mr. Garcia expressed agreement with this plan.

## 2024-11-08 NOTE — LETTER
11/8/2024      Markell Garcia  [address] [facility] [address]      Dear Colleague,      Thank you for referring your patient, Markell Garcia, to the [facility] [place]. Please see a copy of my visit note below.    Orthopaedic Surgery Clinic - New Patient    Chief Complaint:  Right hallux wound.    History of Present Illness:    I saw this very pleasant 59-year-old gentleman with peripheral neuropathy without diabetes today, as new patient to me, at the request of Dr. Air Rowan for evaluation and management of a right hallux hammertoe with an associated ulcer at the tip.  The patient reports a history of recurrent infections at this anatomic location over the years, starting about 10 years ago.  He has had about 4 episodes of recurrent infection since then.  His current and most recent infection appears to have developed some time before or during a recent trip to [place].  He felt some pain that he thought might have been an ingrown toenail, but then realized it was a recurrent infection.  MyChart photos of the right hallux from around 10/18/2024 are reviewed.  He has peripheral neuropathy in his feet and as such does not necessarily feel the wound or infection occur right away.  Prior to this event, the last 1 that he recalls was about 2 years ago.  He sought care by various providers including regular expert wound care with Dr. Rowan.  He has been on doxycycline which appears to be helping more than a previous antibiotic he was given.  He denies any fevers, chills, or night sweats.  He does not currently have significant pain.  He has been weightbearing as tolerated on the right foot in a postoperative shoe, and reports he is doing daily dressing changes.  He has also tried a silicone gel sleeve which keeps coming off, as well as a toe crest-type pad underneath the hallux to elevate the tip off of the weightbearing surface.  History is obtained from review of the  chart as well as interview with the patient today.    Past Medical History:  Past Medical History:   Diagnosis Date    ED (erectile dysfunction)     Gastroesophageal reflux disease without esophagitis     Hammertoe of right foot     Hypercholesteremia     Neuropathy      Past Surgical History:  Past Surgical History:   Procedure Laterality Date    DISCECTOMY LUMBAR MINIMALLY INVASIVE ONE LEVEL  2000    ROTATOR CUFF REPAIR RT/LT Left     ROTATOR CUFF REPAIR RT/LT Right      Social History:  Mr. Garcia reports that he lives at home with his wife, has friends/family nearby, does have reliable transportation to and from appointments, and that his hobbies include fishing, walking, and playing with his grandkids.  He denies tobacco/nicotine use, does drink occasional alcohol, and denies illicit drug use.    Allergies:  No Known Allergies    Medications:  Current Outpatient Medications   Medication Sig Dispense Refill    Ascorbic Acid (VITAMIN C PO)       Cholecalciferol (VITAMIN D3 PO) Take by mouth daily      Coenzyme Q10 (COQ10 PO)       doxycycline hyclate (VIBRA-TABS) 100 MG tablet Take 1 tablet (100 mg) by mouth 2 times daily. 28 tablet 0    doxycycline hyclate (VIBRA-TABS) 100 MG tablet Take 1 tablet (100 mg) by mouth 2 times daily. 20 tablet 0    fluocinonide (LIDEX) 0.05 % external solution Apply topically 2 times daily as needed (chest rash) 60 mL 0    FOLIC ACID PO Take 1 mg by mouth daily      Glucosamine HCl (GLUCOSAMINE PO)       Multiple Vitamins-Minerals (ZINC PO) Zinc      Omega-3 Fatty Acids (FISH OIL PO)       rosuvastatin (CRESTOR) 10 MG tablet Take 1 tablet (10 mg) by mouth daily. 90 tablet 3    sildenafil (VIAGRA) 100 MG tablet Take 1 tablet (100 mg) by mouth daily as needed (30-60 minutes before intercourse for ED) 30 tablet 11     Current Facility-Administered Medications   Medication Dose Route Frequency Provider Last Rate Last Admin    lidocaine (PF) (XYLOCAINE) 1 % injection 4 mL  4 mL      4  "mL at 04/26/24 1152    triamcinolone (KENALOG-40) injection 40 mg  40 mg      40 mg at 04/26/24 1152     Exam:  Estimated body mass index is 26.88 kg/m  as calculated from the following:    Height as of this encounter: 1.97 m (6' 5.56\").    Weight as of this encounter: 104.3 kg (230 lb).  Examination today shows the patient to be a pleasant, cooperative, awake, and alert adult sitting upright on the exam table in no acute distress.  He is nonseptic appearing from a general clinical standpoint.  A right foot postop shoe and right hallux crest pad have been removed for exam.  No other assistive gait devices are visible.  Breathing pattern is regular and nonlabored on room air.  The right foot is notable for a 17 x 14 mm transversely oriented oval partial-thickness open wound at the tip of the right hallux.  This is sterilely cleansed and does not appear to probe completely through the dermis.  I am unable to probe through the subcutaneous tissues or to bone.  I do not see subcutaneous fat exposed.  The hallux also appears to have a flexion contracture deformity at the IP joint which is partially but not fully passively correctable.  The hallux MTP joint appears to be supple.  The hallux toenail appears to have been partially removed.  There is no surrounding fluctuance.  There is minimal erythema around the hallux distal phalangeal area but not proximal to this.  There is no streaking lymphangitis up the right foot or ankle.  Light touch sensation is endorsed as diminished in the hallux.  There are 3/4 easily palpable right DP and PT pulses.  Hallux motor strength is 5/5 for EHL, EHB, FHL, and FHB.  There are no identifiable masses or fluctuance.  There is no expressible drainage.  There is some dried serosanguinous appearing drainage on the dressings that were changed.  A new sterile dressing is applied today.  There are also hammertoe deformities of the right second, third, fourth, and fifth toes, with the second " appearing to be worst.  These appear to be passively correctible except for the second toe, which is partially though not fully correctable.    Imaging:  Independent review of imaging was performed including AP and oblique radiographs of the right foot and a lateral radiograph of the right forefoot/hallux dated 10/29/2024.  Hammertoe deformity of right hallux and lesser toes.  No obvious fractures or dislocations.    Impression:  Very pleasant 59-year-old gentleman with recurrent right hallux ulceration and associated fixed right hallux hammertoe deformity with chronic recurrent ulceration, in the setting of peripheral neuropathy without known history of type 2 diabetes mellitus.    Plan:  The diagnosis, findings, suspected etiology and risk factors, and treatment options both nonsurgical and surgical were all discussed in layman's terms.  I discussed that the neuropathy and the fixed hallux hammertoe deformity both appear to play a role in the development of his recurrent ulceration at the tip of the right hallux.  Continue daily sterile dry dressing changes.  Consideration for possible MRI scan for nonimprovement of wound.  I also recommend surgery in the form of deformity correction and right hallux IP joint fusion to reduce the hammertoe deformity and lessen the chance for recurrent ulceration.  Ideally this would be done after healing of the wound, since the screw for a hallux IP joint fusion normally enters the toe through the area where the current infected open wound appears to be.  I discussed that sometimes however these types of wounds will not heal unless the deformity is first corrected.  I would recommend that this condition be followed closely.  The potential for worsening infection leading to the need for amputation was discussed.  Options for follow-up including phone call, video visit, MyChart, return face-to-face visit with me, or communicating through Dr. Rowan's visits were all discussed and  offered.  All questions were answered.    Addendum:  The same day but after the encounter, I called the patient to discuss the radiology report for the 10/29/2024 right foot radiographs indicating that the cortical indistinctness was concerning for osteomyelitis.  I recommend we obtain a right forefoot MRI scan to evaluate for osteomyelitis.  Mr. Garcia expressed agreement with this plan.        Again, thank you for allowing me to participate in the care of your patient.      Sincerely,    Camilo Ceja MD

## 2024-11-08 NOTE — NURSING NOTE
Reason for visit:   Chief Complaint   Patient presents with    Consult     New patient consult for hammertoe of right hallux and recurrent ulceration.  He was referred by Dr. Rowan, who recently removed some callus formation on the right hallux, which had become infected.  He is currently on his third round of antibiotics.  He presents today fully weightbearing in a post op shoe.         Patient pre-surgery profile    Non-operative treatments:  Injections No  Bracing Yes, post op shoe, silicone gel sleeves for toe, silicone toe wedges.  Medications Doxycycline hyclate for infection.  Physical Therapy No    Social considerations:  Living situation lives with his wife  Friends/Family nearby Yes  Reliable transportation to and from appointments Yes  Hobbies/recreational activities Fishing, walking, playing with grandkids.  Tobacco/Nicotine use No  Alcohol/illicit drug use Yes, occasional alcohol.  No illicit drug use.    BMI   Body mass index is 26.88 kg/m .                HOOS Hip Dysfunction & Osteoarthritis Outcome Questionnaire         No data to display                 KOOS Knee Survey Assessment         No data to display                 Past Medical History  Past Medical History:   Diagnosis Date    ED (erectile dysfunction)     Gastroesophageal reflux disease without esophagitis     Hammertoe of right foot     Hypercholesteremia     Neuropathy      Past Surgical History:   Procedure Laterality Date    DISCECTOMY LUMBAR MINIMALLY INVASIVE ONE LEVEL  2000    ROTATOR CUFF REPAIR RT/LT Left     ROTATOR CUFF REPAIR RT/LT Right      Ascorbic Acid (VITAMIN C PO)  Cholecalciferol (VITAMIN D3 PO)  Coenzyme Q10 (COQ10 PO)  doxycycline hyclate (VIBRA-TABS) 100 MG tablet  doxycycline hyclate (VIBRA-TABS) 100 MG tablet  fluocinonide (LIDEX) 0.05 % external solution  FOLIC ACID PO  Glucosamine HCl (GLUCOSAMINE PO)  HYDROcodone-acetaminophen (NORCO) 5-325 MG tablet  Multiple Vitamins-Minerals (ZINC PO)  Omega-3 Fatty Acids  (FISH OIL PO)  rosuvastatin (CRESTOR) 10 MG tablet  sildenafil (VIAGRA) 100 MG tablet      No Known Allergies  Family History  Family History   Problem Relation Age of Onset    Hammer toes Mother     Seizure Disorder Mother     Hyperlipidemia Father     Insulin Resistance Father     Hyperlipidemia Paternal Grandfather     Diabetes Type 2  Paternal Uncle     Peripheral Neuropathy Paternal Uncle     Coronary Artery Disease No family hx of     Cerebrovascular Disease No family hx of     Prostate Cancer No family hx of     Colon Cancer No family hx of      Social History   Social History     Tobacco Use    Smoking status: Never    Smokeless tobacco: Never   Vaping Use    Vaping status: Never Used   Substance Use Topics    Alcohol use: Yes     Comment: Once a week, 2-3 beers at a time    Drug use: Never

## 2024-11-09 PROBLEM — L97.511 NEUROPATHIC ULCER OF RIGHT FOOT, LIMITED TO BREAKDOWN OF SKIN (H): Status: ACTIVE | Noted: 2024-11-09

## 2024-11-11 ENCOUNTER — TELEPHONE (OUTPATIENT)
Dept: GASTROENTEROLOGY | Facility: CLINIC | Age: 59
End: 2024-11-11
Payer: COMMERCIAL

## 2024-11-11 NOTE — TELEPHONE ENCOUNTER
Pre visit planning completed.      Procedure details:    Patient scheduled for Colonoscopy on 11/22/24.     Arrival time: 0715. Procedure time 0815    Facility location: [facility]; [address] [address], [address] Floor, [place] [address]. Check in location: 5th Floor.    Sedation type: Conscious sedation     Pre op exam needed? No.    Indication for procedure: screening colonoscopy       Chart review:     Electronic implanted devices? No    Recent diagnosis of diverticulitis within the last 6 weeks? No      Medication review:    Diabetic? No    Anticoagulants? No    Weight loss medication/injectable? No GLP-1 medication per patient's medication list. Nursing to verify with pre-assessment call.    Other medication HOLDING recommendations:  N/A      Prep for procedure:     Bowel prep recommendation: Standard Miralax  Due to: standard bowel prep.    Prep instructions sent via [employer], sent by [facility] DAMON Mary, DAMON  Endoscopy Procedure Pre Assessment   [phone] option 2

## 2024-11-12 ENCOUNTER — OFFICE VISIT (OUTPATIENT)
Dept: PODIATRY | Facility: CLINIC | Age: 59
End: 2024-11-12
Payer: COMMERCIAL

## 2024-11-12 ENCOUNTER — MYC MEDICAL ADVICE (OUTPATIENT)
Dept: PODIATRY | Facility: CLINIC | Age: 59
End: 2024-11-12

## 2024-11-12 DIAGNOSIS — M20.41 HAMMERTOE OF RIGHT FOOT: ICD-10-CM

## 2024-11-12 DIAGNOSIS — L97.522 SKIN ULCER OF LEFT GREAT TOE WITH FAT LAYER EXPOSED (H): Primary | ICD-10-CM

## 2024-11-12 DIAGNOSIS — L03.031 CELLULITIS OF RIGHT TOE: ICD-10-CM

## 2024-11-12 NOTE — TELEPHONE ENCOUNTER
Pre assessment completed for upcoming procedure.   (Please see previous telephone encounter notes for complete details)    Patient  returned call.       Procedure details:    Arrival time and facility location reviewed.    Pre op exam needed? No.    Designated  policy reviewed. Instructed to have someone stay 6  hours post procedure.       Medication review:    Medications reviewed. Please see supporting documentation below. Holding recommendations discussed (if applicable).       Prep for procedure:     Procedure prep instructions reviewed.        Any additional information needed:  N/A      Patient  verbalized understanding and had no questions or concerns at this time.      Brenda Cohen RN  Endoscopy Procedure Pre Assessment   [phone] option 2

## 2024-11-12 NOTE — TELEPHONE ENCOUNTER
Attempted to contact patient in order to complete pre assessment questions.     Patient scheduled for Colonoscopy on 11/22/24.     No answer. Left message to return call to [phone] option 2.    Callback required communication sent via [employer].    Nereida Mary RN  Endoscopy Procedure Pre Assessment

## 2024-11-12 NOTE — PROGRESS NOTES
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)     Past Surgical History:   Procedure Laterality Date    DISCECTOMY LUMBAR MINIMALLY INVASIVE ONE LEVEL  2000    ROTATOR CUFF REPAIR RT/LT Left     ROTATOR CUFF REPAIR RT/LT Right      Social History     Socioeconomic History    Marital status:      Spouse name: Not on file    Number of children: Not on file    Years of education: Not on file    Highest education level: Not on file   Occupational History    Not on file   Tobacco Use    Smoking status: Never    Smokeless tobacco: Never   Vaping Use    Vaping status: Never Used   Substance and Sexual Activity    Alcohol use: Yes     Comment: Once a week, 2-3 beers at a time    Drug use: Never    Sexual activity: Yes     Partners: Female     Birth control/protection: Post-menopausal     Comment: exclusive with partner   Other Topics Concern    Not on file   Social History Narrative    Moved to [place]  from [place]    Lives with wife    Works in supply chain distribution, works remotely and travels frequently    One brother in [place], one in [place], [place]     Daughter is local            First wife  in 2012 of colon cancer      Social Drivers of Health     Financial Resource Strain: Low Risk  (2023)    Financial Resource Strain     Within the past 12 months, have you or your family members you live with been unable to get utilities (heat, electricity) when it was really needed?: No   Food Insecurity: Low Risk  (2023)    Food Insecurity     Within the past 12 months,  did you worry that your food would run out before you got money to buy more?: No     Within the past 12 months, did the food you bought just not last and you didn t have money to get more?: No   Transportation Needs: Low Risk  (9/24/2023)    Transportation Needs     Within the past 12 months, has lack of transportation kept you from medical appointments, getting your medicines, non-medical meetings or appointments, work, or from getting things that you need?: No   Physical Activity: Not on file   Stress: Not on file   Social Connections: Not on file   Interpersonal Safety: Low Risk  (10/21/2024)    Interpersonal Safety     Do you feel physically and emotionally safe where you currently live?: Yes     Within the past 12 months, have you been hit, slapped, kicked or otherwise physically hurt by someone?: No     Within the past 12 months, have you been humiliated or emotionally abused in other ways by your partner or ex-partner?: No   Housing Stability: Low Risk  (9/24/2023)    Housing Stability     Do you have housing? : Yes     Are you worried about losing your housing?: No     Family History   Problem Relation Age of Onset    Hammer toes Mother     Seizure Disorder Mother     Hyperlipidemia Father     Insulin Resistance Father     Hyperlipidemia Paternal Grandfather     Diabetes Type 2  Paternal Uncle     Peripheral Neuropathy Paternal Uncle     Coronary Artery Disease No family hx of     Cerebrovascular Disease No family hx of     Prostate Cancer No family hx of     Colon Cancer No family hx of        Lab Results   Component Value Date    A1C 5.7 09/25/2023    A1C 5.8 01/27/2023                         Subjective findings- 59-year-old returns clinic for ulcer right hallux secondary to blistering with hammertoe and peripheral sensory neuropathy.  Relates he seen Dr. Ceja, I reviewed Dr. Ceja's 11/8/2024 note, and they did decide he probably would need surgical correction of the toe and would prefer the ulcer closed  prior to this, relates he also was going to order an MRI, he relates the toes doing better, relates to no problems with the wound cares, relates to taking the doxycycline with no problems.    Objective findings- Vascular status intact right.  Has a aquilino right distal hallux ulcer this through the dermis into the subcutaneous tissues, decreased edema, decreased erythema, no odor, no calor, serosanguineous drainage, no pain on palpation.    Assessment and plan- Ulcer right hallux secondary to blistering, hammertoe, peripheral sensory neuropathy, infection improved.  Diagnosis and treatment options discussed with the patient.  Will clean the ulcer with normal saline and applied Soraya, Betadine and Biatain silver and wrapped with a sterile Bindu wrap today upon consent.  Continue every other day and as needed for drainage clean this with wound Vashe, apply Iodosorb and Biatain silver and a light dressing.  Continue the DH 2 shoe and offloading.  Discussed with him offloading today.  Continue the doxycycline.  Return to clinic in 1 to 2 weeks.                                                                                      Moderate level of medical decision making.

## 2024-11-12 NOTE — LETTER
2024      Markell Garcia  [address] [facility] [facility] [address]      Dear Colleague,    Thank you for referring your patient, Markell Garcia, to the [facility]. Please see a copy of my visit note below.    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)     Past Surgical History:   Procedure Laterality Date    DISCECTOMY LUMBAR MINIMALLY INVASIVE ONE LEVEL      ROTATOR CUFF REPAIR RT/LT Left     ROTATOR CUFF REPAIR RT/LT Right      Social History     Socioeconomic History    Marital status:      Spouse name: Not on file    Number of children: Not on file    Years of education: Not on file    Highest education level: Not on file   Occupational History    Not on file   Tobacco Use    Smoking status: Never    Smokeless tobacco: Never   Vaping Use    Vaping status: Never Used   Substance and Sexual Activity    Alcohol use: Yes     Comment: Once a week, 2-3 beers at a time    Drug use: Never    Sexual activity: Yes     Partners: Female     Birth control/protection: Post-menopausal     Comment: exclusive with partner   Other Topics Concern    Not on file   Social History Narrative    Moved to [place]  from [place]    Lives with wife    Works in supply chain distribution, works remotely and travels frequently    One brother in [place], one in [place], [place]     Daughter is local            First wife  in  of colon cancer      Social Drivers of Health     Financial Resource Strain: Low Risk  (2023)    Financial Resource Strain      Within the past 12 months, have you or your family members you live with been unable to get utilities (heat, electricity) when it was really needed?: No   Food Insecurity: Low Risk  (9/24/2023)    Food Insecurity     Within the past 12 months, did you worry that your food would run out before you got money to buy more?: No     Within the past 12 months, did the food you bought just not last and you didn t have money to get more?: No   Transportation Needs: Low Risk  (9/24/2023)    Transportation Needs     Within the past 12 months, has lack of transportation kept you from medical appointments, getting your medicines, non-medical meetings or appointments, work, or from getting things that you need?: No   Physical Activity: Not on file   Stress: Not on file   Social Connections: Not on file   Interpersonal Safety: Low Risk  (10/21/2024)    Interpersonal Safety     Do you feel physically and emotionally safe where you currently live?: Yes     Within the past 12 months, have you been hit, slapped, kicked or otherwise physically hurt by someone?: No     Within the past 12 months, have you been humiliated or emotionally abused in other ways by your partner or ex-partner?: No   Housing Stability: Low Risk  (9/24/2023)    Housing Stability     Do you have housing? : Yes     Are you worried about losing your housing?: No     Family History   Problem Relation Age of Onset    Hammer toes Mother     Seizure Disorder Mother     Hyperlipidemia Father     Insulin Resistance Father     Hyperlipidemia Paternal Grandfather     Diabetes Type 2  Paternal Uncle     Peripheral Neuropathy Paternal Uncle     Coronary Artery Disease No family hx of     Cerebrovascular Disease No family hx of     Prostate Cancer No family hx of     Colon Cancer No family hx of        Lab Results   Component Value Date    A1C 5.7 09/25/2023    A1C 5.8 01/27/2023                   Subjective findings- 59-year-old returns clinic for ulcer right hallux  secondary to blistering with hammertoe and peripheral sensory neuropathy.  Relates he seen Dr. Ceja, I reviewed Dr. Ceja's 11/8/2024 note, and they did decide he probably would need surgical correction of the toe and would prefer the ulcer closed prior to this, relates he also was going to order an MRI, he relates the toes doing better, relates to no problems with the wound cares, relates to taking the doxycycline with no problems.    Objective findings- Vascular status intact right.  Has a aquilino right distal hallux ulcer this through the dermis into the subcutaneous tissues, decreased edema, decreased erythema, no odor, no calor, serosanguineous drainage, no pain on palpation.    Assessment and plan- Ulcer right hallux secondary to blistering, hammertoe, peripheral sensory neuropathy, infection improved.  Diagnosis and treatment options discussed with the patient.  Will clean the ulcer with normal saline and applied Soraya, Betadine and Biatain silver and wrapped with a sterile Bindu wrap today upon consent.  Continue every other day and as needed for drainage clean this with wound Vashe, apply Iodosorb and Biatain silver and a light dressing.  Continue the DH 2 shoe and offloading.  Discussed with him offloading today.  Continue the doxycycline.  Return to clinic in 1 to 2 weeks.          Moderate level of medical decision making.      Again, thank you for allowing me to participate in the care of your patient.        Sincerely,      Ari Rowan DPM

## 2024-11-15 ENCOUNTER — TELEPHONE (OUTPATIENT)
Dept: ORTHOPEDICS | Facility: CLINIC | Age: 59
End: 2024-11-15
Payer: COMMERCIAL

## 2024-11-15 NOTE — TELEPHONE ENCOUNTER
Patient confirmed scheduled appointment:  Date: 12/20/24  Time: 9:40am  Visit type: Return foot/ankle  Provider: Dr. Ceja

## 2024-11-21 NOTE — H&P
Markell RONAL Garcia  [id number]  male  59 year old    Reason for procedure/surgery: 59M with GERD, HLD, peripheral neuropathy, who presents for screening colonoscopy.    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)       Past Surgical History:    Past Surgical History:   Procedure Laterality Date    DISCECTOMY LUMBAR MINIMALLY INVASIVE ONE LEVEL  2000    ROTATOR CUFF REPAIR RT/LT Left     ROTATOR CUFF REPAIR RT/LT Right        Past Medical History:   Past Medical History:   Diagnosis Date    ED (erectile dysfunction)     Gastroesophageal reflux disease without esophagitis     Hammertoe of right foot     Hypercholesteremia     Neuropathy        Social History:   Social History     Tobacco Use    Smoking status: Never    Smokeless tobacco: Never   Substance Use Topics    Alcohol use: Yes     Comment: Once a week, 2-3 beers at a time       Family History:   Family History   Problem Relation Age of Onset    Hammer toes Mother     Seizure Disorder Mother     Hyperlipidemia Father     Insulin Resistance Father     Hyperlipidemia Paternal Grandfather     Diabetes Type 2  Paternal Uncle     Peripheral Neuropathy Paternal Uncle     Coronary Artery Disease No family hx of     Cerebrovascular Disease No family hx of     Prostate Cancer No family hx of     Colon Cancer No family hx of        Allergies: No Known Allergies    Active Medications:   Current Outpatient Medications   Medication Sig Dispense Refill    Ascorbic Acid (VITAMIN C PO)       Cholecalciferol (VITAMIN D3 PO) Take by mouth daily      Coenzyme Q10 (COQ10 PO)       doxycycline hyclate (VIBRA-TABS) 100 MG tablet Take 1 tablet (100 mg) by mouth 2 times daily. 28 tablet 0    doxycycline hyclate (VIBRA-TABS) 100 MG tablet Take 1  tablet (100 mg) by mouth 2 times daily. 20 tablet 0    fluocinonide (LIDEX) 0.05 % external solution Apply topically 2 times daily as needed (chest rash) 60 mL 0    FOLIC ACID PO Take 1 mg by mouth daily      Glucosamine HCl (GLUCOSAMINE PO)       Multiple Vitamins-Minerals (ZINC PO) Zinc      Omega-3 Fatty Acids (FISH OIL PO)       rosuvastatin (CRESTOR) 10 MG tablet Take 1 tablet (10 mg) by mouth daily. 90 tablet 3    sildenafil (VIAGRA) 100 MG tablet Take 1 tablet (100 mg) by mouth daily as needed (30-60 minutes before intercourse for ED) 30 tablet 11       Systemic Review:   CONSTITUTIONAL: NEGATIVE for fever, chills, change in weight  ENT/MOUTH: NEGATIVE for ear, mouth and throat problems  RESP: NEGATIVE for significant cough or SOB  CV: NEGATIVE for chest pain, palpitations or peripheral edema    Physical Examination:   Vital Signs: There were no vitals taken for this visit.  GENERAL: healthy, alert and no distress  NECK: no adenopathy, no asymmetry, masses, or scars  RESP: lungs clear to auscultation - no rales, rhonchi or wheezes  CV: regular rate and rhythm, normal S1 S2, no S3 or S4, no murmur, click or rub, no peripheral edema and peripheral pulses strong  ABDOMEN: soft, nontender, no hepatosplenomegaly, no masses and bowel sounds normal  MS: no gross musculoskeletal defects noted, no edema    I examined patient s dentition and informed the patient that dental injuries are a risk of any anesthetic management. No concerns were noted with this patient's dentition. . The patient has consented to proceed with the procedure.    Plan: Appropriate to proceed as scheduled.      Karon Guevara MD  11/22/24    PCP:  Haritha Barton

## 2024-11-22 ENCOUNTER — HOSPITAL ENCOUNTER (OUTPATIENT)
Facility: AMBULATORY SURGERY CENTER | Age: 59
Discharge: HOME | End: 2024-11-22
Attending: STUDENT IN AN ORGANIZED HEALTH CARE EDUCATION/TRAINING PROGRAM
Payer: COMMERCIAL

## 2024-11-22 VITALS
OXYGEN SATURATION: 97 % | HEART RATE: 61 BPM | DIASTOLIC BLOOD PRESSURE: 77 MMHG | HEIGHT: 77 IN | RESPIRATION RATE: 14 BRPM | BODY MASS INDEX: 27.16 KG/M2 | SYSTOLIC BLOOD PRESSURE: 119 MMHG | TEMPERATURE: 96.9 F | WEIGHT: 230 LBS

## 2024-11-22 LAB — COLONOSCOPY: NORMAL

## 2024-11-22 PROCEDURE — 88305 TISSUE EXAM BY PATHOLOGIST: CPT | Mod: TC | Performed by: STUDENT IN AN ORGANIZED HEALTH CARE EDUCATION/TRAINING PROGRAM

## 2024-11-22 PROCEDURE — 88305 TISSUE EXAM BY PATHOLOGIST: CPT | Mod: 26 | Performed by: PATHOLOGY

## 2024-11-22 RX ORDER — ONDANSETRON 2 MG/ML
4 INJECTION INTRAMUSCULAR; INTRAVENOUS
Status: DISCONTINUED | OUTPATIENT
Start: 2024-11-22 | End: 2024-11-22 | Stop reason: HOSPADM

## 2024-11-22 RX ORDER — NALOXONE HYDROCHLORIDE 0.4 MG/ML
0.4 INJECTION, SOLUTION INTRAMUSCULAR; INTRAVENOUS; SUBCUTANEOUS
Status: DISCONTINUED | OUTPATIENT
Start: 2024-11-22 | End: 2024-11-23 | Stop reason: HOSPADM

## 2024-11-22 RX ORDER — NALOXONE HYDROCHLORIDE 0.4 MG/ML
0.2 INJECTION, SOLUTION INTRAMUSCULAR; INTRAVENOUS; SUBCUTANEOUS
Status: DISCONTINUED | OUTPATIENT
Start: 2024-11-22 | End: 2024-11-23 | Stop reason: HOSPADM

## 2024-11-22 RX ORDER — FENTANYL CITRATE 50 UG/ML
INJECTION, SOLUTION INTRAMUSCULAR; INTRAVENOUS PRN
Status: DISCONTINUED | OUTPATIENT
Start: 2024-11-22 | End: 2024-11-22 | Stop reason: HOSPADM

## 2024-11-22 RX ORDER — ONDANSETRON 2 MG/ML
4 INJECTION INTRAMUSCULAR; INTRAVENOUS EVERY 6 HOURS PRN
Status: DISCONTINUED | OUTPATIENT
Start: 2024-11-22 | End: 2024-11-23 | Stop reason: HOSPADM

## 2024-11-22 RX ORDER — ONDANSETRON 4 MG/1
4 TABLET, ORALLY DISINTEGRATING ORAL EVERY 6 HOURS PRN
Status: DISCONTINUED | OUTPATIENT
Start: 2024-11-22 | End: 2024-11-23 | Stop reason: HOSPADM

## 2024-11-22 RX ORDER — PROCHLORPERAZINE MALEATE 10 MG
10 TABLET ORAL EVERY 6 HOURS PRN
Status: DISCONTINUED | OUTPATIENT
Start: 2024-11-22 | End: 2024-11-23 | Stop reason: HOSPADM

## 2024-11-22 RX ORDER — FLUMAZENIL 0.1 MG/ML
0.2 INJECTION, SOLUTION INTRAVENOUS
Status: ACTIVE | OUTPATIENT
Start: 2024-11-22 | End: 2024-11-22

## 2024-11-22 RX ORDER — LIDOCAINE 40 MG/G
CREAM TOPICAL
Status: DISCONTINUED | OUTPATIENT
Start: 2024-11-22 | End: 2024-11-22 | Stop reason: HOSPADM

## 2024-11-25 LAB
PATH REPORT.COMMENTS IMP SPEC: NORMAL
PATH REPORT.COMMENTS IMP SPEC: NORMAL
PATH REPORT.FINAL DX SPEC: NORMAL
PATH REPORT.GROSS SPEC: NORMAL
PATH REPORT.MICROSCOPIC SPEC OTHER STN: NORMAL
PATH REPORT.RELEVANT HX SPEC: NORMAL
PHOTO IMAGE: NORMAL

## 2024-11-27 ENCOUNTER — OFFICE VISIT (OUTPATIENT)
Dept: PODIATRY | Facility: CLINIC | Age: 59
End: 2024-11-27
Payer: COMMERCIAL

## 2024-11-27 DIAGNOSIS — L97.512 SKIN ULCER OF RIGHT GREAT TOE WITH FAT LAYER EXPOSED (H): Primary | ICD-10-CM

## 2024-11-27 DIAGNOSIS — M20.41 HAMMERTOE OF RIGHT FOOT: ICD-10-CM

## 2024-11-27 DIAGNOSIS — L03.031 CELLULITIS OF RIGHT TOE: ICD-10-CM

## 2024-11-27 RX ORDER — DOXYCYCLINE HYCLATE 100 MG
100 TABLET ORAL 2 TIMES DAILY
Qty: 60 TABLET | Refills: 0 | Status: SHIPPED | OUTPATIENT
Start: 2024-11-27

## 2024-11-27 ASSESSMENT — PAIN SCALES - GENERAL: PAINLEVEL_OUTOF10: MILD PAIN (2)

## 2024-11-27 NOTE — PROGRESS NOTES
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)     Past Surgical History:   Procedure Laterality Date    COLONOSCOPY N/A 2024    Procedure: Colonoscopy with polypectomy;  Surgeon: Karon Guevara MD;  Location: [facility] OR    DISCECTOMY LUMBAR MINIMALLY INVASIVE ONE LEVEL      ROTATOR CUFF REPAIR RT/LT Left     ROTATOR CUFF REPAIR RT/LT Right      Social History     Socioeconomic History    Marital status:      Spouse name: Not on file    Number of children: Not on file    Years of education: Not on file    Highest education level: Not on file   Occupational History    Not on file   Tobacco Use    Smoking status: Never    Smokeless tobacco: Never   Vaping Use    Vaping status: Never Used   Substance and Sexual Activity    Alcohol use: Yes     Comment: Once a week, 2-3 beers at a time    Drug use: Never    Sexual activity: Yes     Partners: Female     Birth control/protection: Post-menopausal     Comment: exclusive with partner   Other Topics Concern    Not on file   Social History Narrative    Moved to [place]  from [place]    Lives with wife    Works in supply chain distribution, works remotely and travels frequently    One brother in [place], one in [place], [place]     Daughter is local            First wife  in  of colon cancer      Social Drivers of Health     Financial Resource Strain: Low Risk  (2023)    Financial Resource Strain     Within the past 12 months, have you or your family members you live with been unable to get utilities (heat,  electricity) when it was really needed?: No   Food Insecurity: Low Risk  (9/24/2023)    Food Insecurity     Within the past 12 months, did you worry that your food would run out before you got money to buy more?: No     Within the past 12 months, did the food you bought just not last and you didn t have money to get more?: No   Transportation Needs: Low Risk  (9/24/2023)    Transportation Needs     Within the past 12 months, has lack of transportation kept you from medical appointments, getting your medicines, non-medical meetings or appointments, work, or from getting things that you need?: No   Physical Activity: Not on file   Stress: Not on file   Social Connections: Not on file   Interpersonal Safety: Low Risk  (10/21/2024)    Interpersonal Safety     Do you feel physically and emotionally safe where you currently live?: Yes     Within the past 12 months, have you been hit, slapped, kicked or otherwise physically hurt by someone?: No     Within the past 12 months, have you been humiliated or emotionally abused in other ways by your partner or ex-partner?: No   Housing Stability: Low Risk  (9/24/2023)    Housing Stability     Do you have housing? : Yes     Are you worried about losing your housing?: No     Family History   Problem Relation Age of Onset    Hammer toes Mother     Seizure Disorder Mother     Hyperlipidemia Father     Insulin Resistance Father     Hyperlipidemia Paternal Grandfather     Diabetes Type 2  Paternal Uncle     Peripheral Neuropathy Paternal Uncle     Coronary Artery Disease No family hx of     Cerebrovascular Disease No family hx of     Prostate Cancer No family hx of     Colon Cancer No family hx of          Lab Results   Component Value Date    A1C 5.7 09/25/2023    A1C 5.8 01/27/2023                     Subjective findings- 59-year-old returns clinic for ulcer right hallux secondary to blistering with hammertoe and peripheral sensory neuropathy and infection.  Relates he feels it is  doing better, relates he finished the Doxycycline with no problems on Friday and relates for a few days after he finished it he felt paresthesias in the big toe, relates to doing the wound cares with Iodosorb and the Biatain silver and wearing the DH 2 shoe.  Relates he has a lesion on his left plantar fifth MPJ that he files and it hurts when he does not wear shoes when he has shoes on it does not hurt but he would like this checked as well, relates to no injuries.    Objective findings- DP and PT are 2 out of 4 bilaterally.  Has dorsally contracted digits 1 through 5 bilaterally.  Has a plantar left fifth MPJ minimal hyperkeratotic tissue buildup with dried ecchymotic blood, no erythema, no edema, no drainage, no odor, no pain on palpation, no calor.  Has a right distal hallux ulcer that is partially eschared with edema, serosanguineous drainage, minimal erythema, no odor, no calor, no pain on palpation.    Assessment and plan- Ulcer right Hallux secondary to blistering, Hammertoe, peripheral sensory Neuropathy, infection.  Tyloma with ecchymosis left plantar fifth MPJ.  Diagnosis and treatment options discussed with the patient.  Right hallux ulcer we cleaned with Betadine and applied Soraya and Biatain silver and wrapped with sterile Bindu wrap upon consent today.  Discussed with him offloading.  Continue the DH 2 shoe.  Left fifth MPJ is likely ecchymotic from him trimming this, advised him to use a pumice stone lightly if he is nabil file it and use moisturizing lotion.  Discussed with him insoles, we are going to wait till after he has surgery to get the insoles made.  He has an MRI scheduled for mid December and follow-up with Dr. Ceja after that.  Prescription for doxycycline given and use discussed with them.  Previous notes reviewed.  Return to clinic in 1 to 2 weeks.                                                                Moderate level of medical decision making.

## 2024-11-27 NOTE — LETTER
11/27/2024      Markell Garcia  [address] [facility] [address] [facility] [address]      Dear Colleague,    Thank you for referring your patient, Markell Garcia, to the [facility]. Please see a copy of my visit note below.    Past Medical History:   Diagnosis Date     ED (erectile dysfunction)      Gastroesophageal reflux disease without esophagitis      Hammertoe of right foot      Hypercholesteremia      Neuropathy      Patient Active Problem List   Diagnosis     Neuropathy     Hypercholesteremia     Hallux malleus of right foot     ED (erectile dysfunction)     Gastroesophageal reflux disease without esophagitis     Pain of left lower leg     Neuropathic ulcer of right foot, limited to breakdown of skin (H)     Past Surgical History:   Procedure Laterality Date     COLONOSCOPY N/A 11/22/2024    Procedure: Colonoscopy with polypectomy;  Surgeon: Karon Guevara MD;  Location: [facility] OR     DISCECTOMY LUMBAR MINIMALLY INVASIVE ONE LEVEL  2000     ROTATOR CUFF REPAIR RT/LT Left      ROTATOR CUFF REPAIR RT/LT Right      Social History     Socioeconomic History     Marital status:      Spouse name: Not on file     Number of children: Not on file     Years of education: Not on file     Highest education level: Not on file   Occupational History     Not on file   Tobacco Use     Smoking status: Never     Smokeless tobacco: Never   Vaping Use     Vaping status: Never Used   Substance and Sexual Activity     Alcohol use: Yes     Comment: Once a week, 2-3 beers at a time     Drug use: Never     Sexual activity: Yes     Partners: Female     Birth control/protection: Post-menopausal     Comment: exclusive with partner   Other Topics Concern     Not on file   Social History Narrative    Moved to [place] 2022 from [place]    Lives with wife    Works in supply chain distribution, works remotely and travels frequently    One brother in [place], one in [place], [place]     Daughter is local             First wife  in 2012 of colon cancer      Social Drivers of Health     Financial Resource Strain: Low Risk  (2023)    Financial Resource Strain      Within the past 12 months, have you or your family members you live with been unable to get utilities (heat, electricity) when it was really needed?: No   Food Insecurity: Low Risk  (2023)    Food Insecurity      Within the past 12 months, did you worry that your food would run out before you got money to buy more?: No      Within the past 12 months, did the food you bought just not last and you didn t have money to get more?: No   Transportation Needs: Low Risk  (2023)    Transportation Needs      Within the past 12 months, has lack of transportation kept you from medical appointments, getting your medicines, non-medical meetings or appointments, work, or from getting things that you need?: No   Physical Activity: Not on file   Stress: Not on file   Social Connections: Not on file   Interpersonal Safety: Low Risk  (10/21/2024)    Interpersonal Safety      Do you feel physically and emotionally safe where you currently live?: Yes      Within the past 12 months, have you been hit, slapped, kicked or otherwise physically hurt by someone?: No      Within the past 12 months, have you been humiliated or emotionally abused in other ways by your partner or ex-partner?: No   Housing Stability: Low Risk  (2023)    Housing Stability      Do you have housing? : Yes      Are you worried about losing your housing?: No     Family History   Problem Relation Age of Onset     Hammer toes Mother      Seizure Disorder Mother      Hyperlipidemia Father      Insulin Resistance Father      Hyperlipidemia Paternal Grandfather      Diabetes Type 2  Paternal Uncle      Peripheral Neuropathy Paternal Uncle      Coronary Artery Disease No family hx of      Cerebrovascular Disease No family hx of      Prostate Cancer No family hx of      Colon Cancer No family hx of           Lab Results   Component Value Date    A1C 5.7 09/25/2023    A1C 5.8 01/27/2023                     Subjective findings- 59-year-old returns clinic for ulcer right hallux secondary to blistering with hammertoe and peripheral sensory neuropathy and infection.  Relates he feels it is doing better, relates he finished the Doxycycline with no problems on Friday and relates for a few days after he finished it he felt paresthesias in the big toe, relates to doing the wound cares with Iodosorb and the Biatain silver and wearing the DH 2 shoe.  Relates he has a lesion on his left plantar fifth MPJ that he files and it hurts when he does not wear shoes when he has shoes on it does not hurt but he would like this checked as well, relates to no injuries.    Objective findings- DP and PT are 2 out of 4 bilaterally.  Has dorsally contracted digits 1 through 5 bilaterally.  Has a plantar left fifth MPJ minimal hyperkeratotic tissue buildup with dried ecchymotic blood, no erythema, no edema, no drainage, no odor, no pain on palpation, no calor.  Has a right distal hallux ulcer that is partially eschared with edema, serosanguineous drainage, minimal erythema, no odor, no calor, no pain on palpation.    Assessment and plan- Ulcer right Hallux secondary to blistering, Hammertoe, peripheral sensory Neuropathy, infection.  Tyloma with ecchymosis left plantar fifth MPJ.  Diagnosis and treatment options discussed with the patient.  Right hallux ulcer we cleaned with Betadine and applied Soraya and Biatain silver and wrapped with sterile Bidnu wrap upon consent today.  Discussed with him offloading.  Continue the DH 2 shoe.  Left fifth MPJ is likely ecchymotic from him trimming this, advised him to use a pumice stone lightly if he is nabil file it and use moisturizing lotion.  Discussed with him insoles, we are going to wait till after he has surgery to get the insoles made.  He has an MRI scheduled for mid December and follow-up  with Dr. Ceja after that.  Prescription for doxycycline given and use discussed with them.  Previous notes reviewed.  Return to clinic in 1 to 2 weeks.                                                                Moderate level of medical decision making.      Again, thank you for allowing me to participate in the care of your patient.        Sincerely,        Ari Rowan DPM

## 2024-11-27 NOTE — NURSING NOTE
Markell Garcia's chief complaint for this visit includes:  Chief Complaint   Patient presents with    Right Foot - Pain, WOUND CARE     Right great toe     PCP: Haritha Barton    Referring Provider:  Referred Self, MD  No address on file    There were no vitals taken for this visit.  Mild Pain (2)     Do you need any medication refills at today's visit? NO    No Known Allergies    Enrique Echols, EMT

## 2024-12-04 ENCOUNTER — PRE VISIT (OUTPATIENT)
Dept: NEUROLOGY | Facility: CLINIC | Age: 59
End: 2024-12-04

## 2024-12-04 ENCOUNTER — OFFICE VISIT (OUTPATIENT)
Dept: NEUROLOGY | Facility: CLINIC | Age: 59
End: 2024-12-04
Attending: NEUROLOGICAL SURGERY
Payer: COMMERCIAL

## 2024-12-04 VITALS
HEART RATE: 74 BPM | SYSTOLIC BLOOD PRESSURE: 125 MMHG | DIASTOLIC BLOOD PRESSURE: 71 MMHG | OXYGEN SATURATION: 97 % | BODY MASS INDEX: 27.04 KG/M2 | WEIGHT: 228 LBS

## 2024-12-04 DIAGNOSIS — G62.9 NEUROPATHY: Primary | ICD-10-CM

## 2024-12-04 DIAGNOSIS — M54.16 LUMBAR RADICULOPATHY: ICD-10-CM

## 2024-12-04 RX ORDER — PREGABALIN 75 MG/1
CAPSULE ORAL
Qty: 56 CAPSULE | Refills: 0 | Status: SHIPPED | OUTPATIENT
Start: 2024-12-04 | End: 2025-01-08

## 2024-12-04 NOTE — PATIENT INSTRUCTIONS
You could consider alpha lipoic acid.   -  600 mg every day is often helpful for diabetic neuropathy.    You could try lyrica as follows:  - Lyrica 75 mg at bedtime for two weeks, then   - 75 mg twice daily for two weeks, then               - let me know how things are going on mychart    Obtain an Ultrasound of your legs to look for common vascular insults that can cause numbness/tingling    Obtain some labs:  - SPEP, Immunofixation, B12, B6, Folic acid, ANCA, Iron and iron binding, ferritin

## 2024-12-04 NOTE — PROGRESS NOTES
[facility] Neurology Consultation    Markell Garcia MRN# [id number]   Age: 59 year old YOB: 1965     Requesting physician: Haritha Schaffer     Reason for Consultation:Neuropathy      History of Presenting Symptoms:   Markell Garcia is a 59 year old male who presents today for evaluation of neuropathy.    The patient has a pertinent medical history of right hammertoe, lumbar discectomy 2000 for right leg pain, and lower back pain.  The patient was seen 4/8/2024 with Neurosurgery providers for left leg pain, and had subsequent EMG (4/15/2024) show length depdendent sensorimotor polyneuropathy, and no findings of lumbar radiculopathy or plexopathy.  It was noted in future visits he had positive responses to left knee corticosteroid injection 4/26/2024.  He did have an MRI lumbar spine 2/26/2024 show degenerative disc disease at multiple levels (L4-5, L5-S1) and collapse of disc space at L5-S1.  Conservative treatment was recommended, as was neurology evaluation for polyneuropathy.    On further chart review, the patient's neuropathy was known and managed through outside neurology providers, with notes from 4/16/2021 reviewed.  He had normal A1c, no history of alcohol use, no chemotherapy, and had no specific deficits in B12. Folic acid was low (4.9) on 3/3/2021 testing. Symptoms at that time were described as numbness and tingling in the feet going on for 5 years.  The patient was then seen through our neuromuscular clinic 9/6/2022 for this neuropathy, with notes reviewed today.  6/23/2022 EMG showed length dependent sensorimotor axonal polyneuropathy of moderate severity and right lumbar radiculopathy (L4, S1, possibly L5) that were chronic.  In that visit, the patient did express a heavy use of alcohol in his past (college, and again around his wife's passing in 2010).  Genetic testing was deferred.    Today, the patient confirms that he has always had transient tingling in his feet  that would come and go for seconds after long walks.  However, in the last few months he started having tingling lasting long and during periods of rest (while sitting on an airplane).  There is no inclusion of his hands.  He does feel the tingling is spreading into his lower leg (past his ankles).  He does find that if he gets blisters under his callus in the right foot, he often gets infection.  Neuropathy seems to impact this rate of infection given ongoing numbness.       Medications:   Rosuvastatin     Physical Exam:   Vitals: /71 (BP Location: Right arm, Patient Position: Sitting, Cuff Size: Adult Large)   Pulse 74   Wt 103.4 kg (228 lb)   SpO2 97%   BMI 27.04 kg/m     General: Seated comfortably in no acute distress.  Neurologic:     Mental Status: Fully alert, attentive and oriented. Speech clear and fluent, no paraphasic errors.     Cranial Nerves: Visual fields intact. PERRL. EOMI with normal smooth pursuit. Facial sensation intact/symmetric. Facial movements symmetric. Hearing not formally tested but intact to conversation. Palate elevation symmetric, uvula midline. No dysarthria. Shoulder shrug strong bilaterally. Tongue protrusion midline.     Motor: No tremors or other abnormal movements observed. Muscle tone normal throughout. No pronator drift. Normal/symmetric rapid finger tapping. Strength 5/5 throughout upper and lower extremities.     Deep Tendon Reflexes: 2+/symmetric throughout upper extremities b/l. Patellar are trace b/l, achilles are absent. No clonus. Toes downgoing bilaterally.     Sensory:Pinprick and light touch differentiation normal in hands but with errors from knees distally b/l. Vibration absent at toes, 2 seconds at right MM and 8 seconds on left, 2-8 seconds b/l at mid-leg and knees.  Proprioception with deficits in toes b/l. Positive  Romberg.      Coordination: Finger-nose-finger intact without dysmetria. Rapid alternating movements intact/symmetric with normal speed  and rhythm.     Gait: Normal, steady casual gait (slightly favoring his right foot as he is in a walking boot).  Starts and stops easily. Turns easily. Good arm swing.  Tandem is poor on either leg.          Data: Pertinent prior to visit   Imaging:  Reviewed today: MRI lumbar spine 2/26/2024    Procedures:  EMG as stated above    Laboratory:  Reviewed labs from 5/17/2021, and 3/3/2021.           Assessment and Plan:   Assessment:  Peripheral neuropathy (idiopathic at this time), distal symmetric sensorimotor axonal    The patient's neuropathy is leading to some minor imbalance and progressive numbness and tingling spreading into the ankles and legs when comparing today's exam to others in his past, as well as by his clinical description.  We spent time discussing that there could be a genetic cause, but that testing may not necessarily change outcomes in terms of treatment.  The patient did express interest in trying agents for treatment of his numbness, with understanding that most studies are based on dysesthesia reduction over paraesthesia reduction, and that response to treatment may be more in-line with 15-20% rate.  He was understanding of side-effects of Lyrica, and will contact me after a month of titration up on the drug.  Otherwise, repeat serum studies for reversible causes could be done along with testing for alternative causes of transient numbness from prolonged periods of sitting (US LEONARDO).    Plan:  SPEP, Immunofixation, B6, B12, ANCA, Iron and iron binding, ferritin, folic acid  US LEONARDO b/l  Lyrica 75 mg at bedtime for two weeks, then 75 mg BID    Follow up in Neurology clinic in 3 months (virtually), or should new concerns arise.    THOM Doyle D.O.   of Neurology    Total time today (62 min) in this patient encounter was spent on pre-charting, counseling and/or coordination of care.  The patient is in agreement with this plan and has no further questions. The longitudinal  plan of care for the diagnosis(es)/condition(s) as documented were addressed during this visit. Due to the added complexity in care, I will continue to support Markell in the subsequent management and with ongoing continuity of care.

## 2024-12-04 NOTE — LETTER
12/4/2024       RE: Markell Garcia  [address] [address]     Dear Colleague,    Thank you for referring your patient, Markell Garcia, to the [facility] NEUROLOGY CLINIC [place] at [facility]. Please see a copy of my visit note below.    [facility] Neurology Consultation    Markell Garcia MRN# [id number]   Age: 59 year old YOB: 1965     Requesting physician: Haritha Schaffer     Reason for Consultation:Neuropathy      History of Presenting Symptoms:   Markell Garcia is a 59 year old male who presents today for evaluation of neuropathy.    The patient has a pertinent medical history of right hammertoe, lumbar discectomy 2000 for right leg pain, and lower back pain.  The patient was seen 4/8/2024 with Neurosurgery providers for left leg pain, and had subsequent EMG (4/15/2024) show length depdendent sensorimotor polyneuropathy, and no findings of lumbar radiculopathy or plexopathy.  It was noted in future visits he had positive responses to left knee corticosteroid injection 4/26/2024.  He did have an MRI lumbar spine 2/26/2024 show degenerative disc disease at multiple levels (L4-5, L5-S1) and collapse of disc space at L5-S1.  Conservative treatment was recommended, as was neurology evaluation for polyneuropathy.    On further chart review, the patient's neuropathy was known and managed through outside neurology providers, with notes from 4/16/2021 reviewed.  He had normal A1c, no history of alcohol use, no chemotherapy, and had no specific deficits in B12. Folic acid was low (4.9) on 3/3/2021 testing. Symptoms at that time were described as numbness and tingling in the feet going on for 5 years.  The patient was then seen through our neuromuscular clinic 9/6/2022 for this neuropathy, with notes reviewed today.  6/23/2022 EMG showed length dependent sensorimotor axonal polyneuropathy of moderate severity and  right lumbar radiculopathy (L4, S1, possibly L5) that were chronic.  In that visit, the patient did express a heavy use of alcohol in his past (college, and again around his wife's passing in 2010).  Genetic testing was deferred.    Today, the patient confirms that he has always had transient tingling in his feet that would come and go for seconds after long walks.  However, in the last few months he started having tingling lasting long and during periods of rest (while sitting on an airplane).  There is no inclusion of his hands.  He does feel the tingling is spreading into his lower leg (past his ankles).  He does find that if he gets blisters under his callus in the right foot, he often gets infection.  Neuropathy seems to impact this rate of infection given ongoing numbness.       Medications:   Rosuvastatin     Physical Exam:   Vitals: /71 (BP Location: Right arm, Patient Position: Sitting, Cuff Size: Adult Large)   Pulse 74   Wt 103.4 kg (228 lb)   SpO2 97%   BMI 27.04 kg/m     General: Seated comfortably in no acute distress.  Neurologic:     Mental Status: Fully alert, attentive and oriented. Speech clear and fluent, no paraphasic errors.     Cranial Nerves: Visual fields intact. PERRL. EOMI with normal smooth pursuit. Facial sensation intact/symmetric. Facial movements symmetric. Hearing not formally tested but intact to conversation. Palate elevation symmetric, uvula midline. No dysarthria. Shoulder shrug strong bilaterally. Tongue protrusion midline.     Motor: No tremors or other abnormal movements observed. Muscle tone normal throughout. No pronator drift. Normal/symmetric rapid finger tapping. Strength 5/5 throughout upper and lower extremities.     Deep Tendon Reflexes: 2+/symmetric throughout upper extremities b/l. Patellar are trace b/l, achilles are absent. No clonus. Toes downgoing bilaterally.     Sensory:Pinprick and light touch differentiation normal in hands but with errors from  knees distally b/l. Vibration absent at toes, 2 seconds at right MM and 8 seconds on left, 2-8 seconds b/l at mid-leg and knees.  Proprioception with deficits in toes b/l. Positive  Romberg.      Coordination: Finger-nose-finger intact without dysmetria. Rapid alternating movements intact/symmetric with normal speed and rhythm.     Gait: Normal, steady casual gait (slightly favoring his right foot as he is in a walking boot).  Starts and stops easily. Turns easily. Good arm swing.  Tandem is poor on either leg.          Data: Pertinent prior to visit   Imaging:  Reviewed today: MRI lumbar spine 2/26/2024    Procedures:  EMG as stated above    Laboratory:  Reviewed labs from 5/17/2021, and 3/3/2021.           Assessment and Plan:   Assessment:  Peripheral neuropathy (idiopathic at this time), distal symmetric sensorimotor axonal    The patient's neuropathy is leading to some minor imbalance and progressive numbness and tingling spreading into the ankles and legs when comparing today's exam to others in his past, as well as by his clinical description.  We spent time discussing that there could be a genetic cause, but that testing may not necessarily change outcomes in terms of treatment.  The patient did express interest in trying agents for treatment of his numbness, with understanding that most studies are based on dysesthesia reduction over paraesthesia reduction, and that response to treatment may be more in-line with 15-20% rate.  He was understanding of side-effects of Lyrica, and will contact me after a month of titration up on the drug.  Otherwise, repeat serum studies for reversible causes could be done along with testing for alternative causes of transient numbness from prolonged periods of sitting (US LEONARDO).    Plan:  SPEP, Immunofixation, B6, B12, ANCA, Iron and iron binding, ferritin, folic acid  US LEONARDO b/l  Lyrica 75 mg at bedtime for two weeks, then 75 mg BID    Follow up in Neurology clinic in 3 months  (virtually), or should new concerns arise.    THOM Doyle D.O.   of Neurology    Total time today (62 min) in this patient encounter was spent on pre-charting, counseling and/or coordination of care.  The patient is in agreement with this plan and has no further questions. The longitudinal plan of care for the diagnosis(es)/condition(s) as documented were addressed during this visit. Due to the added complexity in care, I will continue to support Markell in the subsequent management and with ongoing continuity of care.        Again, thank you for allowing me to participate in the care of your patient.      Sincerely,    Orlando Doyle, DO

## 2024-12-06 PROBLEM — D12.6 ADENOMATOUS COLON POLYP: Status: ACTIVE | Noted: 2024-12-06

## 2024-12-10 ENCOUNTER — OFFICE VISIT (OUTPATIENT)
Dept: PODIATRY | Facility: CLINIC | Age: 59
End: 2024-12-10
Payer: COMMERCIAL

## 2024-12-10 DIAGNOSIS — L97.512 SKIN ULCER OF RIGHT GREAT TOE WITH FAT LAYER EXPOSED (H): Primary | ICD-10-CM

## 2024-12-10 DIAGNOSIS — M20.41 HAMMERTOE OF RIGHT FOOT: ICD-10-CM

## 2024-12-10 PROCEDURE — 99214 OFFICE O/P EST MOD 30 MIN: CPT | Performed by: PODIATRIST

## 2024-12-10 NOTE — NURSING NOTE
Markell Garcia's chief complaint for this visit includes:  Chief Complaint   Patient presents with    Consult     Toe ulcer recheck and wound care     PCP: Haritha Barton    Referring Provider:  Referred Self, MD  No address on file    There were no vitals taken for this visit.  Data Unavailable     Do you need any medication refills at today's visit? NO    No Known Allergies    Palak Clemons LPN

## 2024-12-10 NOTE — LETTER
12/10/2024      Markell Garcia  [address] [facility] [facility] [address]      Dear Colleague,    Thank you for referring your patient, Markell Garcia, to the [facility]. Please see a copy of my visit note below.    Past Medical History:   Diagnosis Date     ED (erectile dysfunction)      Gastroesophageal reflux disease without esophagitis      Hammertoe of right foot      Hypercholesteremia      Neuropathy      Patient Active Problem List   Diagnosis     Neuropathy     Hypercholesteremia     Hallux malleus of right foot     ED (erectile dysfunction)     Gastroesophageal reflux disease without esophagitis     Pain of left lower leg     Neuropathic ulcer of right foot, limited to breakdown of skin (H)     Adenomatous colon polyp     Past Surgical History:   Procedure Laterality Date     COLONOSCOPY N/A 11/22/2024    Procedure: Colonoscopy with polypectomy;  Surgeon: Karon Guevara MD;  Location: [facility] OR     DISCECTOMY LUMBAR MINIMALLY INVASIVE ONE LEVEL  2000     ROTATOR CUFF REPAIR RT/LT Left      ROTATOR CUFF REPAIR RT/LT Right      Social History     Socioeconomic History     Marital status:      Spouse name: Not on file     Number of children: Not on file     Years of education: Not on file     Highest education level: Not on file   Occupational History     Not on file   Tobacco Use     Smoking status: Never     Smokeless tobacco: Never   Vaping Use     Vaping status: Never Used   Substance and Sexual Activity     Alcohol use: Yes     Comment: Once a week, 2-3 beers at a time     Drug use: Never     Sexual activity: Yes     Partners: Female     Birth control/protection: Post-menopausal     Comment: exclusive with partner   Other Topics Concern     Not on file   Social History Narrative    Moved to [place] 2022 from [place]    Lives with wife    Works in supply chain distribution, works remotely and travels frequently    One brother in [place], one in [place], [place]      Daughter is local            First wife  in 2012 of colon cancer      Social Drivers of Health     Financial Resource Strain: Low Risk  (2024)    Financial Resource Strain      Within the past 12 months, have you or your family members you live with been unable to get utilities (heat, electricity) when it was really needed?: No   Food Insecurity: Low Risk  (2024)    Food Insecurity      Within the past 12 months, did you worry that your food would run out before you got money to buy more?: No      Within the past 12 months, did the food you bought just not last and you didn t have money to get more?: No   Transportation Needs: Low Risk  (2024)    Transportation Needs      Within the past 12 months, has lack of transportation kept you from medical appointments, getting your medicines, non-medical meetings or appointments, work, or from getting things that you need?: No   Physical Activity: Insufficiently Active (2024)    Exercise Vital Sign      Days of Exercise per Week: 3 days      Minutes of Exercise per Session: 30 min   Stress: No Stress Concern Present (2024)    [place] [place] [employer] - Occupational Stress Questionnaire      Feeling of Stress : Not at all   Social Connections: Unknown (2024)    Social Connection and Isolation Panel [NHANES]      Frequency of Communication with Friends and Family: Not on file      Frequency of Social Gatherings with Friends and Family: Three times a week      Attends Jehovah's witness Services: Not on file      Active Member of Clubs or Organizations: Not on file      Attends Club or Organization Meetings: Not on file      Marital Status: Not on file   Interpersonal Safety: Low Risk  (10/21/2024)    Interpersonal Safety      Do you feel physically and emotionally safe where you currently live?: Yes      Within the past 12 months, have you been hit, slapped, kicked or otherwise physically hurt by someone?: No      Within the past 12  months, have you been humiliated or emotionally abused in other ways by your partner or ex-partner?: No   Housing Stability: Low Risk  (12/8/2024)    Housing Stability      Do you have housing? : Yes      Are you worried about losing your housing?: No     Family History   Problem Relation Age of Onset     Hammer toes Mother      Seizure Disorder Mother      Hyperlipidemia Father      Insulin Resistance Father      Hyperlipidemia Paternal Grandfather      Diabetes Type 2  Paternal Uncle      Peripheral Neuropathy Paternal Uncle      Coronary Artery Disease No family hx of      Cerebrovascular Disease No family hx of      Prostate Cancer No family hx of      Colon Cancer No family hx of          Lab Results   Component Value Date    A1C 5.7 09/25/2023    A1C 5.8 01/27/2023                         Subjective findings- 59-year-old returns clinic for ulcer right hallux with hammertoe and peripheral sensory neuropathy and infection.  Relates it is doing better, relates it appears to be scabbed over, relates he is using Iodosorb and Biatain silver and doing wound cares, relates to using the buttress pad in the DH 2 shoe with no problems.  Relates to taking the Doxycycline with no problems.  Relates he has an appointment for MRI and ABIs tomorrow and has an appointment next week with Dr. Ceja.    Objective findings- Vascular status intact right.  Has a right distal Hallux eschared ulceration with loosening of the eschar on the margins, minimal edema, no erythema, no odor, no calor, no pain on palpation, dystrophic thickened toenail.  Hammertoe right hallux.    Assessment and plan- Ulcer right hallux secondary to blistering, hammertoe, peripheral sensory Neuropathy, infection.  Diagnosis and treatment options discussed with the patient.  The loose eschar on the wound margins was reduced with a tissue cutter upon consent.  We cleaned the ulcer with Betadine and applied Biatain silver and a sterile gauze dressing today upon  consent.  Will have him continue cleaning this with Betadine and applying Biatain silver and a sterile dressing daily.  Continue the buttress pad and DH 2 shoe.  Follow-up with MRI, LEONARDO and Dr. Ceja as scheduled.  Continue the Doxycycline.  Previous notes reviewed.  Schedule to see me in 2 weeks and change at appointment pending what he does with his Orthopedic appointment.                                            Moderate level of medical decision making.      Again, thank you for allowing me to participate in the care of your patient.        Sincerely,        Ari Rowan DPM

## 2024-12-10 NOTE — PROGRESS NOTES
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)    Adenomatous colon polyp     Past Surgical History:   Procedure Laterality Date    COLONOSCOPY N/A 2024    Procedure: Colonoscopy with polypectomy;  Surgeon: Karon Guevara MD;  Location: [facility] OR    DISCECTOMY LUMBAR MINIMALLY INVASIVE ONE LEVEL      ROTATOR CUFF REPAIR RT/LT Left     ROTATOR CUFF REPAIR RT/LT Right      Social History     Socioeconomic History    Marital status:      Spouse name: Not on file    Number of children: Not on file    Years of education: Not on file    Highest education level: Not on file   Occupational History    Not on file   Tobacco Use    Smoking status: Never    Smokeless tobacco: Never   Vaping Use    Vaping status: Never Used   Substance and Sexual Activity    Alcohol use: Yes     Comment: Once a week, 2-3 beers at a time    Drug use: Never    Sexual activity: Yes     Partners: Female     Birth control/protection: Post-menopausal     Comment: exclusive with partner   Other Topics Concern    Not on file   Social History Narrative    Moved to [place]  from [place]    Lives with wife    Works in supply chain distribution, works remotely and travels frequently    One brother in [place], one in [place], [place]     Daughter is local            First wife  in  of colon cancer      Social Drivers of Health     Financial Resource Strain: Low Risk  (2024)    Financial Resource Strain     Within the past 12 months, have you or your family members you live with been unable to  get utilities (heat, electricity) when it was really needed?: No   Food Insecurity: Low Risk  (12/8/2024)    Food Insecurity     Within the past 12 months, did you worry that your food would run out before you got money to buy more?: No     Within the past 12 months, did the food you bought just not last and you didn t have money to get more?: No   Transportation Needs: Low Risk  (12/8/2024)    Transportation Needs     Within the past 12 months, has lack of transportation kept you from medical appointments, getting your medicines, non-medical meetings or appointments, work, or from getting things that you need?: No   Physical Activity: Insufficiently Active (12/8/2024)    Exercise Vital Sign     Days of Exercise per Week: 3 days     Minutes of Exercise per Session: 30 min   Stress: No Stress Concern Present (12/8/2024)    [place] [employer] - Occupational Stress Questionnaire     Feeling of Stress : Not at all   Social Connections: Unknown (12/8/2024)    Social Connection and Isolation Panel [NHANES]     Frequency of Communication with Friends and Family: Not on file     Frequency of Social Gatherings with Friends and Family: Three times a week     Attends Zoroastrian Services: Not on file     Active Member of Clubs or Organizations: Not on file     Attends Club or Organization Meetings: Not on file     Marital Status: Not on file   Interpersonal Safety: Low Risk  (10/21/2024)    Interpersonal Safety     Do you feel physically and emotionally safe where you currently live?: Yes     Within the past 12 months, have you been hit, slapped, kicked or otherwise physically hurt by someone?: No     Within the past 12 months, have you been humiliated or emotionally abused in other ways by your partner or ex-partner?: No   Housing Stability: Low Risk  (12/8/2024)    Housing Stability     Do you have housing? : Yes     Are you worried about losing your housing?: No     Family History   Problem Relation Age  of Onset    Hammer toes Mother     Seizure Disorder Mother     Hyperlipidemia Father     Insulin Resistance Father     Hyperlipidemia Paternal Grandfather     Diabetes Type 2  Paternal Uncle     Peripheral Neuropathy Paternal Uncle     Coronary Artery Disease No family hx of     Cerebrovascular Disease No family hx of     Prostate Cancer No family hx of     Colon Cancer No family hx of          Lab Results   Component Value Date    A1C 5.7 09/25/2023    A1C 5.8 01/27/2023                         Subjective findings- 59-year-old returns clinic for ulcer right hallux with hammertoe and peripheral sensory neuropathy and infection.  Relates it is doing better, relates it appears to be scabbed over, relates he is using Iodosorb and Biatain silver and doing wound cares, relates to using the buttress pad in the DH 2 shoe with no problems.  Relates to taking the Doxycycline with no problems.  Relates he has an appointment for MRI and ABIs tomorrow and has an appointment next week with Dr. Ceja.    Objective findings- Vascular status intact right.  Has a right distal Hallux eschared ulceration with loosening of the eschar on the margins, minimal edema, no erythema, no odor, no calor, no pain on palpation, dystrophic thickened toenail.  Hammertoe right hallux.    Assessment and plan- Ulcer right hallux secondary to blistering, hammertoe, peripheral sensory Neuropathy, infection.  Diagnosis and treatment options discussed with the patient.  The loose eschar on the wound margins was reduced with a tissue cutter upon consent.  We cleaned the ulcer with Betadine and applied Biatain silver and a sterile gauze dressing today upon consent.  Will have him continue cleaning this with Betadine and applying Biatain silver and a sterile dressing daily.  Continue the buttress pad and DH 2 shoe.  Follow-up with MRI, LEONARDO and Dr. Ceja as scheduled.  Continue the Doxycycline.  Previous notes reviewed.  Schedule to see me in 2 weeks and change  at appointment pending what he does with his Orthopedic appointment.                                            Moderate level of medical decision making.

## 2024-12-11 ENCOUNTER — ANCILLARY PROCEDURE (OUTPATIENT)
Dept: MRI IMAGING | Facility: CLINIC | Age: 59
End: 2024-12-11
Attending: ORTHOPAEDIC SURGERY
Payer: COMMERCIAL

## 2024-12-11 ENCOUNTER — ANCILLARY PROCEDURE (OUTPATIENT)
Dept: ULTRASOUND IMAGING | Facility: CLINIC | Age: 59
End: 2024-12-11
Attending: PSYCHIATRY & NEUROLOGY
Payer: COMMERCIAL

## 2024-12-11 ENCOUNTER — LAB (OUTPATIENT)
Dept: LAB | Facility: CLINIC | Age: 59
End: 2024-12-11
Payer: COMMERCIAL

## 2024-12-11 DIAGNOSIS — G62.9 NEUROPATHY: ICD-10-CM

## 2024-12-11 DIAGNOSIS — M20.31 HALLUX MALLEUS OF RIGHT FOOT: ICD-10-CM

## 2024-12-11 LAB
FERRITIN SERPL-MCNC: 177 NG/ML (ref 31–409)
FOLATE SERPL-MCNC: 10.9 NG/ML (ref 4.6–34.8)
IRON BINDING CAPACITY (ROCHE): 290 UG/DL (ref 240–430)
IRON SATN MFR SERPL: 23 % (ref 15–46)
IRON SERPL-MCNC: 68 UG/DL (ref 61–157)
TOTAL PROTEIN SERUM FOR ELP: 6.9 G/DL (ref 6.4–8.3)
VIT B12 SERPL-MCNC: 422 PG/ML (ref 232–1245)

## 2024-12-11 PROCEDURE — 86334 IMMUNOFIX E-PHORESIS SERUM: CPT | Mod: 26 | Performed by: PATHOLOGY

## 2024-12-11 PROCEDURE — 84155 ASSAY OF PROTEIN SERUM: CPT | Performed by: PATHOLOGY

## 2024-12-11 PROCEDURE — 93922 UPR/L XTREMITY ART 2 LEVELS: CPT | Mod: GC | Performed by: STUDENT IN AN ORGANIZED HEALTH CARE EDUCATION/TRAINING PROGRAM

## 2024-12-11 PROCEDURE — 83550 IRON BINDING TEST: CPT | Performed by: PATHOLOGY

## 2024-12-11 PROCEDURE — 36415 COLL VENOUS BLD VENIPUNCTURE: CPT | Performed by: PATHOLOGY

## 2024-12-11 PROCEDURE — 86334 IMMUNOFIX E-PHORESIS SERUM: CPT | Performed by: PATHOLOGY

## 2024-12-11 PROCEDURE — 86036 ANCA SCREEN EACH ANTIBODY: CPT | Performed by: PSYCHIATRY & NEUROLOGY

## 2024-12-11 PROCEDURE — 84165 PROTEIN E-PHORESIS SERUM: CPT | Mod: 26 | Performed by: PATHOLOGY

## 2024-12-11 PROCEDURE — 84207 ASSAY OF VITAMIN B-6: CPT | Mod: 90 | Performed by: PATHOLOGY

## 2024-12-11 PROCEDURE — 82746 ASSAY OF FOLIC ACID SERUM: CPT | Performed by: PSYCHIATRY & NEUROLOGY

## 2024-12-11 PROCEDURE — 83540 ASSAY OF IRON: CPT | Performed by: PATHOLOGY

## 2024-12-11 PROCEDURE — 84165 PROTEIN E-PHORESIS SERUM: CPT | Mod: TC | Performed by: PATHOLOGY

## 2024-12-11 PROCEDURE — 82607 VITAMIN B-12: CPT | Performed by: PSYCHIATRY & NEUROLOGY

## 2024-12-11 PROCEDURE — 99000 SPECIMEN HANDLING OFFICE-LAB: CPT | Performed by: PATHOLOGY

## 2024-12-11 PROCEDURE — 82728 ASSAY OF FERRITIN: CPT | Performed by: PATHOLOGY

## 2024-12-12 LAB
ALBUMIN SERPL ELPH-MCNC: 4.5 G/DL (ref 3.7–5.1)
ALPHA1 GLOB SERPL ELPH-MCNC: 0.2 G/DL (ref 0.2–0.4)
ALPHA2 GLOB SERPL ELPH-MCNC: 0.5 G/DL (ref 0.5–0.9)
ANCA AB PATTERN SER IF-IMP: NORMAL
B-GLOBULIN SERPL ELPH-MCNC: 0.8 G/DL (ref 0.6–1)
C-ANCA TITR SER IF: NORMAL {TITER}
GAMMA GLOB SERPL ELPH-MCNC: 0.9 G/DL (ref 0.7–1.6)
LOCATION OF TASK: NORMAL
LOCATION OF TASK: NORMAL
M PROTEIN SERPL ELPH-MCNC: 0 G/DL
PROT PATTERN SERPL ELPH-IMP: NORMAL
PROT PATTERN SERPL IFE-IMP: NORMAL

## 2024-12-13 LAB — PYRIDOXAL PHOS SERPL-SCNC: 54.3 NMOL/L

## 2024-12-13 PROCEDURE — 82374 ASSAY BLOOD CARBON DIOXIDE: CPT | Mod: ORL | Performed by: FAMILY MEDICINE

## 2024-12-13 PROCEDURE — 80048 BASIC METABOLIC PNL TOTAL CA: CPT | Mod: ORL | Performed by: FAMILY MEDICINE

## 2024-12-13 PROCEDURE — 83718 ASSAY OF LIPOPROTEIN: CPT | Mod: ORL | Performed by: FAMILY MEDICINE

## 2024-12-20 ENCOUNTER — OFFICE VISIT (OUTPATIENT)
Dept: ORTHOPEDICS | Facility: CLINIC | Age: 59
End: 2024-12-20
Attending: ORTHOPAEDIC SURGERY
Payer: COMMERCIAL

## 2024-12-20 DIAGNOSIS — M86.9 RIGHT HALLUX OSTEOMYELITIS (H): Primary | ICD-10-CM

## 2024-12-20 DIAGNOSIS — M20.31 HALLUX MALLEUS OF RIGHT FOOT: ICD-10-CM

## 2024-12-20 PROCEDURE — 99214 OFFICE O/P EST MOD 30 MIN: CPT | Performed by: ORTHOPAEDIC SURGERY

## 2024-12-20 NOTE — PROGRESS NOTES
Orthopaedic Surgery Clinic Follow-up Appointment    Chief Complaint:  Follow-up right hallux infection, review MRI  Date of Onset:  Originally about 10 years ago.  Current episode mid-October 2024.    History:  I saw this pleasant 59-year-old active gentleman today with idiopathic peripheral neuropathy in follow-up for a right hallux contracture and associated ulcer at the the tip of the toe.  In the interim since his last appointment he has had a right foot MRI scan which I reviewed with him and does show findings suspicious for osteomyelitis involving large areas of the distal phalanx of the right hallux and to a lesser extent, the distal aspect of the proximal phalanx as well.  He reports that there does not appear to be any recent drainage from his ulcer.  He has been dressing this per Dr. Rowan's instructions on a regular basis.  He denies any recent fevers or chills.  He does endorse about 2 out of 10 pain.    Exam:  Examination shows the right foot to have a clean, dry, and intact appearing dressing in place.  The right foot stiff-soled protective medical shoe is removed for examination.  The dressings are changed sterilely.  The hallux has a fixed hammertoe contracture with a fixed flexion contracture of the IP joint.  The MTP joint is mobile.  There is diffuse swelling, erythema, and tenderness around the distal phalanx.  The tip has a callused thickened area of skin at the site of the previous ulcer, but there is no currently appreciable skin defect or drainage.  I am unable to identify any focal masses or areas of fluctuance.  More proximally, the IP joint, proximal phalanx, and MTP appear nontender to palpation.       I reviewed the 12/11/2024 MRI scan findings with the patient, with the results as noted above.    Impression:  Very pleasant 59-year-old gentleman with idiopathic peripheral neuropathy, chronic recurrent ulceration at the tip of a right fixed hallux malleus deformity, and suspected  osteomyelitis involving the distal phalanx and the distal aspect of the proximal phalanx.    Diagnosis and treatment options were discussed:  1.  Nonoperative treatment with close observation.  The potential risks of this were discussed.  2.  Amputation of the right hallux, either through the IP joint, through the proximal phalanx, or the entire hallux (MTP disarticulation).  3.  Single-stage debridement and irrigation of the right hallux osteomyelitis, deformity correction, and IP joint arthrodesis (to reduce the risk of future recurrent ulceration due to the deformity).  Somewhat risky given implantation of metal at the same setting after debridement of osteomyelitis.  One way to mitigate this might be antegrade placement of K-wires for the arthrodesis instead of the more commonly done (but in this case riskier) screw entering through the tip of the toe near the ulcer.  4.  Staged debridement and irrigation for the right hallux osteomyelitis, antibiotic spacer placement, with possible deformity correction at this time, followed by antibiotic therapy with a later staged deformity correction and IP joint arthrodesis (to reduce the risk of future recurrent ulceration due to the deformity) after improvement of clinical, radiographic, and laboratory findings.  More time consuming and still risky given a history of osteomyelitis, but likely less risky than option #3 above.    The nature of these surgical options, the risks, benefits, and alternatives, the postoperative plan, and realistic expectations for outcome were all discussed in layman's terms.  Mr. Garcia expressed his wish to think about his options for time being and contact us through [employer] should he wish to proceed with his choice, which he expresses would likely be either option 3 or option 4 above.  All questions this very pleasant gentleman had at this time were answered.

## 2024-12-20 NOTE — LETTER
12/20/2024      Markell Garcia  [address] [address]      Dear Colleague,    Thank you for referring your patient, Markell Garcia, to the [facility] [place]. Please see a copy of my visit note below.    Orthopaedic Surgery Clinic Follow-up Appointment    Chief Complaint:  Follow-up right hallux infection, review MRI  Date of Onset:  Originally about 10 years ago.  Current episode mid-October 2024.    History:  I saw this pleasant 59-year-old active gentleman today with idiopathic peripheral neuropathy in follow-up for a right hallux contracture and associated ulcer at the the tip of the toe.  In the interim since his last appointment he has had a right foot MRI scan which I reviewed with him and does show findings suspicious for osteomyelitis involving large areas of the distal phalanx of the right hallux and to a lesser extent, the distal aspect of the proximal phalanx as well.  He reports that there does not appear to be any recent drainage from his ulcer.  He has been dressing this per Dr. Rowan's instructions on a regular basis.  He denies any recent fevers or chills.  He does endorse about 2 out of 10 pain.    Exam:  Examination shows the right foot to have a clean, dry, and intact appearing dressing in place.  The right foot stiff-soled protective medical shoe is removed for examination.  The dressings are changed sterilely.  The hallux has a fixed hammertoe contracture with a fixed flexion contracture of the IP joint.  The MTP joint is mobile.  There is diffuse swelling, erythema, and tenderness around the distal phalanx.  The tip has a callused thickened area of skin at the site of the previous ulcer, but there is no currently appreciable skin defect or drainage.  I am unable to identify any focal masses or areas of fluctuance.  More proximally, the IP joint, proximal phalanx, and MTP appear nontender to palpation.       I reviewed the 12/11/2024 MRI  scan findings with the patient, with the results as noted above.    Impression:  Very pleasant 59-year-old gentleman with idiopathic peripheral neuropathy, chronic recurrent ulceration at the tip of a right fixed hallux malleus deformity, and suspected osteomyelitis involving the distal phalanx and the distal aspect of the proximal phalanx.    Diagnosis and treatment options were discussed:  1.  Nonoperative treatment with close observation.  The potential risks of this were discussed.  2.  Amputation of the right hallux, either through the IP joint, through the proximal phalanx, or the entire hallux (MTP disarticulation).  3.  Single-stage debridement and irrigation of the right hallux osteomyelitis, deformity correction, and IP joint arthrodesis (to reduce the risk of future recurrent ulceration due to the deformity).  Somewhat risky given implantation of metal at the same setting after debridement of osteomyelitis.  One way to mitigate this might be antegrade placement of K-wires for the arthrodesis instead of the more commonly done (but in this case riskier) screw entering through the tip of the toe near the ulcer.  4.  Staged debridement and irrigation for the right hallux osteomyelitis, antibiotic spacer placement, with possible deformity correction at this time, followed by antibiotic therapy with a later staged deformity correction and IP joint arthrodesis (to reduce the risk of future recurrent ulceration due to the deformity) after improvement of clinical, radiographic, and laboratory findings.  More time consuming and still risky given a history of osteomyelitis, but likely less risky than option #3 above.    The nature of these surgical options, the risks, benefits, and alternatives, the postoperative plan, and realistic expectations for outcome were all discussed in layman's terms.  Mr. Garcia expressed his wish to think about his options for time being and contact us through [employer] should he wish  to proceed with his choice, which he expresses would likely be either option 3 or option 4 above.  All questions this very pleasant gentleman had at this time were answered.      Again, thank you for allowing me to participate in the care of your patient.        Sincerely,        Camilo Ceja MD    Electronically signed

## 2024-12-20 NOTE — NURSING NOTE
Reason For Visit:   Chief Complaint   Patient presents with    RECHECK     MRI R foot results, scheduled for 12/11/24           Pain Assessment  Patient Currently in Pain: Yes  Patient's Stated Pain Goal: 2    Amelia Kennedy

## 2024-12-26 ENCOUNTER — PREP FOR PROCEDURE (OUTPATIENT)
Dept: ORTHOPEDICS | Facility: CLINIC | Age: 59
End: 2024-12-26
Payer: COMMERCIAL

## 2024-12-26 ENCOUNTER — TELEPHONE (OUTPATIENT)
Dept: ORTHOPEDICS | Facility: CLINIC | Age: 59
End: 2024-12-26
Payer: COMMERCIAL

## 2024-12-26 PROBLEM — M86.9: Status: ACTIVE | Noted: 2024-12-26

## 2024-12-26 NOTE — TELEPHONE ENCOUNTER
Patient is scheduled for surgery with Dr. Ceja    Spoke with: Patient    Date of Surgery: 1/15/25    Location: [place]    Post op: 2 & 6 weeks    Pre op with Provider: Complete    H&P: Scheduled with PAC 1/6/25    Additional imaging/appointments: N/A    Surgery packet: Mailed to patient     Additional comments: N/A        Gely Rueda MA on 12/26/2024 at 11:58 AM

## 2024-12-26 NOTE — TELEPHONE ENCOUNTER
Phoned patient to schedule surgery with Dr Ceja. I left him my direct number to call back at his convenience. [phone]

## 2024-12-26 NOTE — TELEPHONE ENCOUNTER
FUTURE VISIT INFORMATION      SURGERY INFORMATION:  1/15/25 // Dr Ceja   Consult: ov 12/20    RECORDS REQUESTED FROM:       Primary Care Provider: [facility]

## 2025-01-06 ENCOUNTER — ANESTHESIA EVENT (OUTPATIENT)
Dept: SURGERY | Facility: CLINIC | Age: 60
End: 2025-01-06
Payer: COMMERCIAL

## 2025-01-06 ENCOUNTER — VIRTUAL VISIT (OUTPATIENT)
Dept: SURGERY | Facility: CLINIC | Age: 60
End: 2025-01-06
Payer: COMMERCIAL

## 2025-01-06 VITALS — HEIGHT: 77 IN | BODY MASS INDEX: 27.39 KG/M2 | WEIGHT: 232 LBS

## 2025-01-06 DIAGNOSIS — Z01.818 PRE-OP EVALUATION: Primary | ICD-10-CM

## 2025-01-06 DIAGNOSIS — M86.9 RIGHT HALLUX OSTEOMYELITIS (H): ICD-10-CM

## 2025-01-06 PROCEDURE — 98002 SYNCH AUDIO-VIDEO NEW MOD 45: CPT | Performed by: NURSE PRACTITIONER

## 2025-01-06 RX ORDER — OMEPRAZOLE 20 MG/1
20 TABLET, DELAYED RELEASE ORAL DAILY PRN
COMMUNITY

## 2025-01-06 ASSESSMENT — LIFESTYLE VARIABLES: TOBACCO_USE: 0

## 2025-01-06 ASSESSMENT — PAIN SCALES - GENERAL: PAINLEVEL_OUTOF10: NO PAIN (0)

## 2025-01-06 ASSESSMENT — ENCOUNTER SYMPTOMS: SEIZURES: 0

## 2025-01-06 NOTE — PROGRESS NOTES
Markell is a 59 year old who is being evaluated via a billable video visit.    How would you like to obtain your AVS? MyChart  If the video visit is dropped, the invitation should be resent by: Text to cell phone: [phone]    Subjective   Markell is a 59 year old, presenting for the following health issues:  Pre-Op Exam    HPI           Physical Exam

## 2025-01-06 NOTE — PATIENT INSTRUCTIONS
Preparing for Your Surgery      Name:  Markell Garcia   MRN:  [id number]   :  1965   Today's Date:  2025       Arriving for surgery:  Surgery date:  1/15/25  Arrival time:  10:00 am        Please come to:      [facility] [address]   [address] [place]  [address]   The Green Ramp for patients and visitors is beneath the [facility]. The parking facility entrance is at the intersection of [address] [place] [address] and [address]. Patients and visitors who self-park will receive the reduced hospital parking rate (no ticket validation needed).     [employer] parking, located at the [facility] main entrance on [address] [place] [address], is available Monday - Friday from 7 am to 3:30 pm.     Discounted parking pass options can be purchased from  attendants during business hours.     -Check in at the security desk in the [facility] ([facility])   Lobby. They will direct you to the correct elevators.   -Proceed to the 3rd floor, Adult Surgery Waiting Lounge. [phone]     If you need directions, a wheelchair or escort please stop at the Information Desk in the lobby.  Inform the information person you are here for surgery; a wheelchair and escort to [address] will be provided.   An escort to the Adult Surgery Waiting Lounge will be provided. .    What can I eat or drink?  -  You may eat and drink normally up to 8 hours prior to arrival time.   -  You may have clear liquids until 2 hours prior to arrival time.    Examples of clear liquids:  Water  Clear broth  Juices (apple, white grape, white cranberry  and cider) without pulp  Noncarbonated, powder based beverages  (lemonade and Cristi-Aid)  Sodas (Sprite, 7-Up, ginger ale and seltzer)  Coffee or tea (without milk or cream)  Gatorade    -  No Alcohol or cannabis products for at least 24 hours before surgery.     Which  medicines can I take?    Hold Aspirin for 7 days before surgery.   Hold Multivitamins for 7 days before surgery.  Hold Supplements (Co Q-10 and fish oil) for 7 days before surgery.  Hold Ibuprofen (Advil, Motrin) for 1 day(s) before surgery--unless otherwise directed by surgeon.  Hold Naproxen (Aleve) for 4 days before surgery.  Hold viagra 24 hours before surgery.  -  DO NOT take these medications the day of surgery:  Vitamin C and D3, floic acid, vibra-tabs, glucosamine, zinc.    -  PLEASE TAKE these medications the day of surgery:  Tylenol if needed; take crestor and omeprazole.    How do I prepare myself?  - Please take 2 showers (one the night prior to surgery and one the morning of surgery) using Scrubcare or Hibiclens soap.    Use this soap only from the neck to your toes. Avoid genital area      Leave the soap on your skin for one minute--then rinse thoroughly.      You may use your own shampoo and conditioner. No other hair products.   - Please remove all jewelry and body piercings.  - No lotions, deodorants or fragrance.  - No makeup or fingernail polish.   - Bring your ID and insurance card.    -If you use a CPAP machine, please bring the CPAP machine, tubing, and mask to hospital.    -If you have a Deep Brain Stimulator, Spinal Cord Stimulator, or any Neuro Stimulator device---you must bring the remote control to the hospital.      ALL PATIENTS GOING HOME THE SAME DAY OF SURGERY ARE REQUIRED TO HAVE A RESPONSIBLE ADULT TO DRIVE AND BE IN ATTENDANCE WITH THEM FOR 24 HOURS FOLLOWING SURGERY.    Covid testing policy as of 12/06/2022  Your surgeon will notify and schedule you for a COVID test if one is needed before surgery--please direct any questions or COVID symptoms to your surgeon      Questions or Concerns:    - For any questions regarding the day of surgery or your hospital stay, please contact the Pre Admission Nursing Office at [phone].       - If you have health changes between today and your  surgery, please call your surgeon.       - For questions after surgery, please call your surgeons office.           Current Visitor Guidelines    You may have 2 visitors in the pre op area.    Visiting hours: 8 a.m. to 8:30 p.m.    Patients confirmed or suspected to have symptoms of COVID 19 or flu:     No visitors allowed for adult patients.   Children (under age 18) can have 1 named visitor.     People who are sick or showing symptoms of COVID 19 or flu:    Are not allowed to visit patients--we can only make exceptions in special situations.       Please follow these guidelines for your visit:          Please maintain social distance          Masking is optional--however at times you may be asked to wear a mask for the safety of yourself and others     Clean your hands with alcohol hand . Do this when you arrive at and leave the building and patient room,    And again after you touch your mask or anything in the room.     Go directly to and from the room you are visiting.     Stay in the patient s room during your visit. Limit going to other places in the hospital as much as possible     Leave bags and jackets at home or in the car.     For everyone s health, please don t come and go during your visit. That includes for smoking   during your visit.

## 2025-01-06 NOTE — H&P
Pre-Operative H & P     CC:  Preoperative exam to assess for increased cardiopulmonary risk while undergoing surgery and anesthesia.    Date of Encounter: 1/6/2025  Primary Care Physician:  Haritha Barton     Reason for visit:   Encounter Diagnoses   Name Primary?    Pre-op evaluation Yes    Right hallux osteomyelitis (H)        GORDON Garcia is a 59 year old male who presents for pre-operative H & P in preparation for  Procedure Information       Case: [id number] Date/Time: 01/15/25 1220    Procedures:       Debridement and Irrigation of Right Hallux, Possible Placement of Antibiotic Beads or Spacer (Right: Foot)      Flexor Hallucis Longus Tenotomy (Right: Foot)    Anesthesia type: Choice with Block    Diagnosis: Right hallux osteomyelitis (H) [M86.9]    Pre-op diagnosis: Right hallux osteomyelitis (H) [M86.9]    Location:  OR 14 / [facility]    Providers: Camilo Ceja MD            The patient presents to the PAC virtually today in preparation for the above scheduled procedure with comorbid conditions including HLD, neuroapthy, GERD and erectile dysfunction.    The patient is followed by Dr. Ceja in the Orthopaedic Surgery clinic for right hallux infection.  The patient has a h/o peripheral neuropathy and chronic recurrent ulceration at the tip of a right fixed hallus malleus deformity.  Dr. Ceja ordered imaging of the right foot for further evaluation.  The MRI scan with findings suspicious for osteomyelitis involving the distal phalanx and the distal aspect of the proximal phalanx.    Dr. Ceja counseled the patient of the findings and treatment options.  The patient has now been scheduled for the procedure as listed above.      History is obtained from the patient and chart review    Hx of abnormal bleeding or anti-platelet use: denies      Past Medical History  Past Medical History:   Diagnosis Date    Constipation     ED (erectile dysfunction)     Gastroesophageal reflux disease without esophagitis      Hammertoe of right foot     Hypercholesteremia     Neuropathy        Past Surgical History  Past Surgical History:   Procedure Laterality Date    COLONOSCOPY N/A 11/22/2024    Procedure: Colonoscopy with polypectomy;  Surgeon: Karon Guevara MD;  Location: [facility] OR    DISCECTOMY LUMBAR MINIMALLY INVASIVE ONE LEVEL  2000    ROTATOR CUFF REPAIR RT/LT Left     ROTATOR CUFF REPAIR RT/LT Right        Prior to Admission Medications  Current Outpatient Medications   Medication Sig Dispense Refill    alpha-lipoic acid 100 MG capsule Take 100 mg by mouth every morning.      Ascorbic Acid (VITAMIN C PO) Take 500 mg by mouth every morning.      Cholecalciferol (VITAMIN D3 PO) Take 1,000 Units by mouth daily.      Coenzyme Q10 (COQ10 PO) Take 1 tablet by mouth every morning.      doxycycline hyclate (VIBRA-TABS) 100 MG tablet Take 1 tablet (100 mg) by mouth 2 times daily. 60 tablet 0    FOLIC ACID PO Take 1 mg by mouth every morning.      Glucosamine HCl (GLUCOSAMINE PO) Take 1 capsule by mouth every morning.      Multiple Vitamins-Minerals (ZINC PO) Take 1 capsule by mouth every morning. Zinc      Omega-3 Fatty Acids (FISH OIL PO) Take 1 capsule by mouth every morning.      omeprazole (PRILOSEC OTC) 20 MG EC tablet Take 20 mg by mouth daily as needed (heartburn).      pregabalin (LYRICA) 75 MG capsule Take 1 capsule (75 mg) by mouth at bedtime. (Patient taking differently: Take 75 mg by mouth 2 times daily.) 90 capsule 3    rosuvastatin (CRESTOR) 10 MG tablet Take 1 tablet (10 mg) by mouth daily. (Patient taking differently: Take 10 mg by mouth every morning.) 90 tablet 3    sildenafil (VIAGRA) 100 MG tablet Take 1 tablet (100 mg) by mouth daily as needed (30-60 minutes before intercourse for ED). 30 tablet 11       Allergies  No Known Allergies    Social History  Social History     Socioeconomic History    Marital status:      Spouse name: Not on file    Number of children: Not on file    Years of education: Not  on file    Highest education level: Not on file   Occupational History    Not on file   Tobacco Use    Smoking status: Never    Smokeless tobacco: Never   Vaping Use    Vaping status: Never Used   Substance and Sexual Activity    Alcohol use: Yes     Comment: Once a week, 2-3 beers at a time    Drug use: Never    Sexual activity: Yes     Partners: Female     Birth control/protection: Post-menopausal     Comment: exclusive with partner   Other Topics Concern    Not on file   Social History Narrative    Moved to [place]  from [place]    Lives with wife    Works in supply chain distribution, works remotely and travels frequently    One brother in [place], one in [place], [place]     Daughter is local            First wife  in  of colon cancer      Social Drivers of Health     Financial Resource Strain: Low Risk  (2024)    Financial Resource Strain     Within the past 12 months, have you or your family members you live with been unable to get utilities (heat, electricity) when it was really needed?: No   Food Insecurity: Low Risk  (2024)    Food Insecurity     Within the past 12 months, did you worry that your food would run out before you got money to buy more?: No     Within the past 12 months, did the food you bought just not last and you didn t have money to get more?: No   Transportation Needs: Low Risk  (2024)    Transportation Needs     Within the past 12 months, has lack of transportation kept you from medical appointments, getting your medicines, non-medical meetings or appointments, work, or from getting things that you need?: No   Physical Activity: Insufficiently Active (2024)    Exercise Vital Sign     Days of Exercise per Week: 3 days     Minutes of Exercise per Session: 30 min   Stress: No Stress Concern Present (2024)    [employer] - Occupational Stress Questionnaire     Feeling of Stress : Not at all   Social Connections: Unknown  (12/8/2024)    Social Connection and Isolation Panel [NHANES]     Frequency of Communication with Friends and Family: Not on file     Frequency of Social Gatherings with Friends and Family: Three times a week     Attends Islam Services: Not on file     Active Member of Clubs or Organizations: Not on file     Attends Club or Organization Meetings: Not on file     Marital Status: Not on file   Interpersonal Safety: Low Risk  (12/13/2024)    Interpersonal Safety     Do you feel physically and emotionally safe where you currently live?: Yes     Within the past 12 months, have you been hit, slapped, kicked or otherwise physically hurt by someone?: No     Within the past 12 months, have you been humiliated or emotionally abused in other ways by your partner or ex-partner?: No   Housing Stability: Low Risk  (12/8/2024)    Housing Stability     Do you have housing? : Yes     Are you worried about losing your housing?: No       Family History  Family History   Problem Relation Age of Onset    Hammer toes Mother     Seizure Disorder Mother     Hyperlipidemia Father     Insulin Resistance Father     Parkinsonism Father     Hyperlipidemia Paternal Grandfather     Diabetes Type 2  Paternal Uncle     Peripheral Neuropathy Paternal Uncle     Coronary Artery Disease No family hx of     Cerebrovascular Disease No family hx of     Prostate Cancer No family hx of     Colon Cancer No family hx of        Review of Systems  The complete review of systems is negative other than noted in the HPI or here.   Anesthesia Evaluation   Pt has had prior anesthetic. Type: General.    History of anesthetic complications  - .  Slow to wake.    ROS/MED HX  ENT/Pulmonary:     (+)     SUSIE risk factors,                                (-) tobacco use and asthma   Neurologic:     (+)    peripheral neuropathy,                         (-) no seizures, no CVA and no TIA   Cardiovascular: Comment: Denies cardiac symptoms including chest pain, SOB,  palpitations, syncope, DE ANDA, orthopnea, or PND.        (+) Dyslipidemia - -   -  - -                                   (-) hypertension and taking anticoagulants/antiplatelets   METS/Exercise Tolerance: >4 METS Comment: Riding stationary bike for 30 minutes most days of week.    Hematologic:    (-) history of blood clots, anemia and history of blood transfusion   Musculoskeletal: Comment:  Right Hallux ulcer secondary to blistering, Hammertoe, peripheral sensory Neuropathy, infection.     H/o Bilateral rotator cuff surgery.       GI/Hepatic:    (-) GERD and liver disease   Renal/Genitourinary:    (-) renal disease   Endo:    (-) Type I DM, Type II DM, thyroid disease, chronic steroid usage and obesity   Psychiatric/Substance Use:    (-) psychiatric history, alcohol abuse history and chronic opioid use history   Infectious Disease:  - neg infectious disease ROS     Malignancy:  - neg malignancy ROS     Other:  - neg other ROS    (+)  , no H/O Chronic Pain,         Virtual visit -  No vitals were obtained    Physical Exam  Constitutional: Awake, alert, cooperative, no apparent distress, and appears stated age.  Eyes: Pupils equal  HENT: Normocephalic  Respiratory: non labored breathing   Neurologic: Awake, alert, oriented to name, place and time.   Neuropsychiatric: Calm, cooperative. Normal affect.      Prior Labs/Diagnostic Studies   All labs and imaging personally reviewed    Latest Reference Range & Units 12/13/24 11:03   Sodium 135 - 145 mmol/L 141   Potassium 3.4 - 5.3 mmol/L 4.4   Chloride 98 - 107 mmol/L 105   Carbon Dioxide (CO2) 22 - 29 mmol/L 28   Urea Nitrogen 8.0 - 23.0 mg/dL 14.0   Creatinine 0.67 - 1.17 mg/dL 0.93   GFR Estimate >60 mL/min/1.73m2 >90   Calcium 8.8 - 10.4 mg/dL 9.2   Anion Gap 7 - 15 mmol/L 8   Cholesterol <200 mg/dL 170   Patient Fasting?  No  No   Glucose 70 - 99 mg/dL 110 (H)   HDL Cholesterol >=40 mg/dL 55   Estimated Average Glucose <117 mg/dL 114   Hemoglobin A1C 0.0 - 5.6 % 5.6    LDL Cholesterol Calculated <100 mg/dL 75   Non HDL Cholesterol <130 mg/dL 115   Triglycerides <150 mg/dL 201 (H)   (H): Data is abnormally high      PROCEDURES  MRI of the right forefoot dated 12/11/2024.     COMPARISON: 10/29/2024.     CLINICAL HISTORY: Hallux malleus, evaluate for osteomyelitis.                                                                   IMPRESSION:  1. Diffuse bone marrow edema within the distal phalanx with a subtle  focus of bone marrow edema within the mid to distal proximal phalanx  of the great toe. Additionally there is subtle low T1 signal involving  the most distal aspect of the distal phalanx of the great toe, with  loss of the cortical bone signal on the T1-weighted sequences, MRI  findings most consistent with osteomyelitis.     2. No discrete soft tissue mass or fluid collection.     3. Mild diffuse muscle atrophy with diffuse muscular edema which may  represent myositis versus microangiopathy.     TRISH FISHER MD       Bilateral lower extremity resting ankle brachial indices dated  12/11/2024 11:33 AM     Comparison study: None.     Clinical history: Neuropathy.                                                          Impression:      Right leg: Resting LEONARDO is 1.22. Normal.     Left leg: Resting LEONARDO is 1.17. Normal.     EKG/ stress test - if available please see in ROS above   No results found.       No data to display                  The patient's records and results personally reviewed by this provider.     Outside records reviewed from: Care Everywhere      Assessment    Markell Garcia is a 59 year old male seen as a PAC referral for risk assessment and optimization for anesthesia.    Plan/Recommendations  Pt will be optimized for the proposed procedure.  See below for details on the assessment, risk, and preoperative recommendations    NEUROLOGY  - No history of TIA, CVA or seizure    - Neuropathy of feet  Lyrica and folic acid    -Post Op delirium risk factors:   "No risk identified    ENT  - No current airway concerns.  Will need to be reassessed day of surgery.  Mallampati: Unable to assess  TM: Unable to assess    CARDIAC  - No history of CAD, Hypertension, and Afib    - HLD  Continue statin as prescribed    -  Denies cardiac symptoms.    - METS (Metabolic Equivalents)  Patient performs 4 or more METS exercise without symptoms             Total Score: 0      - RCRI-Very low risk: Class 1 0.4% complication rate             Total Score: 0        PULMONARY  - SUSIE Low Risk             Total Score: 2    SUSIE: Over 50 ys old    SUISE: Male      - Denies asthma or inhaler use    - Tobacco History    History   Smoking Status    Never   Smokeless Tobacco    Never       GI  - GERD  Controlled on medications: Proton Pump Inhibitor    - PONV Medium Risk  Total Score: 2           1 AN PONV: Patient is not a current smoker    1 AN PONV: Intended Post Op Opioids        /RENAL  - Baseline Creatinine  see above     - erectile dysfunction  Sildenafil    ENDOCRINE    - BMI: Estimated body mass index is 27.51 kg/m  as calculated from the following:    Height as of this encounter: 1.956 m (6' 5\").    Weight as of this encounter: 105.2 kg (232 lb).  Overweight (BMI 25.0-29.9)    - No history of Diabetes Mellitus    HEME  VTE Low Risk 0.5%             Total Score: 3    VTE: Greater than 59 yrs old    VTE: Male      - No history of abnormal bleeding or antiplatelet use.    - Denies a h/o anemia or previous blood transfusion      MSK  -  Right hallux infection suspected osteomyelitis  Doxycycline  Above procedure scheduled    - h/o b/l rotator cuff repair    - Patient is NOT Frail             Total Score: 1    Frailty: Slower walking speed          Different anesthesia methods/types have been discussed with the patient, but they are aware that the final plan will be decided by the assigned anesthesia provider on the date of service.      The patient is optimized for their procedure. AVS with " information on surgery time/arrival time, meds and NPO status given by nursing staff. No further diagnostic testing indicated.    Please refer to the physical examination documented by the anesthesiologist in the anesthesia record on the day of surgery.    Video-Visit Details    Type of service:  Video Visit    Provider received verbal consent for a Video Visit from the patient? Yes     Originating Location (pt. Location): Home    Distant Location (provider location):  Off-site  Mode of Communication:  Video Conference via [employer]  On the day of service:     Prep time: 13 minutes  Visit time: 15 minutes  Documentation time: 18 minutes  ------------------------------------------  Total time: 46 minutes      LARISSA Hyman CNP  Preoperative Assessment Center  [facility]  Phone: [phone]  Fax: [phone]

## 2025-01-08 ENCOUNTER — OFFICE VISIT (OUTPATIENT)
Dept: PODIATRY | Facility: CLINIC | Age: 60
End: 2025-01-08
Payer: COMMERCIAL

## 2025-01-08 DIAGNOSIS — M20.41 HAMMERTOE OF RIGHT FOOT: ICD-10-CM

## 2025-01-08 DIAGNOSIS — L97.512 SKIN ULCER OF RIGHT GREAT TOE WITH FAT LAYER EXPOSED (H): Primary | ICD-10-CM

## 2025-01-08 PROCEDURE — 99207 PR DROP WITH A PROCEDURE: CPT | Performed by: PODIATRIST

## 2025-01-08 PROCEDURE — 97597 DBRDMT OPN WND 1ST 20 CM/<: CPT | Performed by: PODIATRIST

## 2025-01-08 NOTE — NURSING NOTE
Markell Garcia's chief complaint for this visit includes:  Chief Complaint   Patient presents with    Consult     Toe ulcer and wound care     PCP: Haritha Barton    Referring Provider:  Referred Self, MD  No address on file    There were no vitals taken for this visit.  Data Unavailable     Do you need any medication refills at today's visit? NO    No Known Allergies    Palak Clemons LPN

## 2025-01-08 NOTE — PROGRESS NOTES
Past Medical History:   Diagnosis Date    Constipation     ED (erectile dysfunction)     Gastroesophageal reflux disease without esophagitis     Hammertoe of right foot     Hypercholesteremia     Neuropathy      Patient Active Problem List   Diagnosis    Idiopathic peripheral neuropathy    Hypercholesteremia    Hallux malleus of right foot    ED (erectile dysfunction)    Gastroesophageal reflux disease without esophagitis    Pain of left lower leg    Neuropathic ulcer of right foot, limited to breakdown of skin (H)    Adenomatous colon polyp    Right hallux osteomyelitis (H)     Past Surgical History:   Procedure Laterality Date    COLONOSCOPY N/A 2024    Procedure: Colonoscopy with polypectomy;  Surgeon: Karon Guevara MD;  Location: [facility] OR    DISCECTOMY LUMBAR MINIMALLY INVASIVE ONE LEVEL      ROTATOR CUFF REPAIR RT/LT Left     ROTATOR CUFF REPAIR RT/LT Right      Social History     Socioeconomic History    Marital status:      Spouse name: Not on file    Number of children: Not on file    Years of education: Not on file    Highest education level: Not on file   Occupational History    Not on file   Tobacco Use    Smoking status: Never    Smokeless tobacco: Never   Vaping Use    Vaping status: Never Used   Substance and Sexual Activity    Alcohol use: Yes     Comment: Once a week, 2-3 beers at a time    Drug use: Never    Sexual activity: Yes     Partners: Female     Birth control/protection: Post-menopausal     Comment: exclusive with partner   Other Topics Concern    Not on file   Social History Narrative    Moved to [place]  from [place]    Lives with wife    Works in supply chain distribution, works remotely and travels frequently    One brother in [place], one in [place], [place]     Daughter is local            First wife  in  of colon cancer      Social Drivers of Health     Financial Resource Strain: Low Risk  (2024)    Financial Resource Strain     Within the past  12 months, have you or your family members you live with been unable to get utilities (heat, electricity) when it was really needed?: No   Food Insecurity: Low Risk  (12/8/2024)    Food Insecurity     Within the past 12 months, did you worry that your food would run out before you got money to buy more?: No     Within the past 12 months, did the food you bought just not last and you didn t have money to get more?: No   Transportation Needs: Low Risk  (12/8/2024)    Transportation Needs     Within the past 12 months, has lack of transportation kept you from medical appointments, getting your medicines, non-medical meetings or appointments, work, or from getting things that you need?: No   Physical Activity: Insufficiently Active (12/8/2024)    Exercise Vital Sign     Days of Exercise per Week: 3 days     Minutes of Exercise per Session: 30 min   Stress: No Stress Concern Present (12/8/2024)    [employer] [place] [employer] - Occupational Stress Questionnaire     Feeling of Stress : Not at all   Social Connections: Unknown (12/8/2024)    Social Connection and Isolation Panel [NHANES]     Frequency of Communication with Friends and Family: Not on file     Frequency of Social Gatherings with Friends and Family: Three times a week     Attends Rastafari Services: Not on file     Active Member of Clubs or Organizations: Not on file     Attends Club or Organization Meetings: Not on file     Marital Status: Not on file   Interpersonal Safety: Low Risk  (12/13/2024)    Interpersonal Safety     Do you feel physically and emotionally safe where you currently live?: Yes     Within the past 12 months, have you been hit, slapped, kicked or otherwise physically hurt by someone?: No     Within the past 12 months, have you been humiliated or emotionally abused in other ways by your partner or ex-partner?: No   Housing Stability: Low Risk  (12/8/2024)    Housing Stability     Do you have housing? : Yes     Are you worried  about losing your housing?: No     Family History   Problem Relation Age of Onset    Hammer toes Mother     Seizure Disorder Mother     Hyperlipidemia Father     Insulin Resistance Father     Parkinsonism Father     Hyperlipidemia Paternal Grandfather     Diabetes Type 2  Paternal Uncle     Peripheral Neuropathy Paternal Uncle     Coronary Artery Disease No family hx of     Cerebrovascular Disease No family hx of     Prostate Cancer No family hx of     Colon Cancer No family hx of                              Subjective findings- 59-year-old returns clinic for right Hallux ulcer with peripheral sensory Neuropathy and Hammertoe.  Relates he has surgery scheduled with Dr. Ceja next week, relates to taking the Doxycycline with no problems, relates doing the wound cares with Iodosorb, relates there is still a little bit of drainage on the dressing.    Objective findings- DP and PT are 2 out of 4 right.  Has dorsally contracted Hallux right.  Has a right distal Hallux eschar with underlying ulceration that is through the Dermis, 1 x 0.5 cm, serosanguineous drainage, mild edema, no erythema, no odor, no calor, no pain on palpation.    Assessment and plan- Right Hallux ulcer secondary to blistering, Hammertoe, peripheral sensory Neuropathy, infection.  Diagnosis and treatment options discussed with the patient.  He has changes consistent with Osteomyelitis on MRI.  Right Hallux ulcer was sharp debrided with a tissue cutter through the Dermis, no anesthesia needed, and local wound care done upon consent with cleaning this with ChloraPrep and applying Soraya, Biatain silver and a sterile dressing.  The ulcer bled well upon debridement.  Will have him continue cleaning this daily with wound Vashe and applying Iodosorb and Biatain silver and sterile dressing.  Follow-up with surgery as scheduled.  Continue the Doxycycline.  Continue the  2 shoe.  Previous notes reviewed.  Return to clinic and see me in 10 to 12  weeks.                                Moderate level of medical decision making.

## 2025-01-08 NOTE — LETTER
1/8/2025      Markell Garcia  [address] [facility] [address]      Dear Colleague,    Thank you for referring your patient, Markell Garcia, to the [facility]. Please see a copy of my visit note below.    Past Medical History:   Diagnosis Date     Constipation      ED (erectile dysfunction)      Gastroesophageal reflux disease without esophagitis      Hammertoe of right foot      Hypercholesteremia      Neuropathy      Patient Active Problem List   Diagnosis     Idiopathic peripheral neuropathy     Hypercholesteremia     Hallux malleus of right foot     ED (erectile dysfunction)     Gastroesophageal reflux disease without esophagitis     Pain of left lower leg     Neuropathic ulcer of right foot, limited to breakdown of skin (H)     Adenomatous colon polyp     Right hallux osteomyelitis (H)     Past Surgical History:   Procedure Laterality Date     COLONOSCOPY N/A 11/22/2024    Procedure: Colonoscopy with polypectomy;  Surgeon: Karon Guevara MD;  Location: [facility] OR     DISCECTOMY LUMBAR MINIMALLY INVASIVE ONE LEVEL  2000     ROTATOR CUFF REPAIR RT/LT Left      ROTATOR CUFF REPAIR RT/LT Right      Social History     Socioeconomic History     Marital status:      Spouse name: Not on file     Number of children: Not on file     Years of education: Not on file     Highest education level: Not on file   Occupational History     Not on file   Tobacco Use     Smoking status: Never     Smokeless tobacco: Never   Vaping Use     Vaping status: Never Used   Substance and Sexual Activity     Alcohol use: Yes     Comment: Once a week, 2-3 beers at a time     Drug use: Never     Sexual activity: Yes     Partners: Female     Birth control/protection: Post-menopausal     Comment: exclusive with partner   Other Topics Concern     Not on file   Social History Narrative    Moved to [place] 2022 from [place]    Lives with wife    Works in supply chain distribution, works remotely and  travels frequently    One brother in [place], one in [place], [place]     Daughter is local            First wife  in 2012 of colon cancer      Social Drivers of Health     Financial Resource Strain: Low Risk  (2024)    Financial Resource Strain      Within the past 12 months, have you or your family members you live with been unable to get utilities (heat, electricity) when it was really needed?: No   Food Insecurity: Low Risk  (2024)    Food Insecurity      Within the past 12 months, did you worry that your food would run out before you got money to buy more?: No      Within the past 12 months, did the food you bought just not last and you didn t have money to get more?: No   Transportation Needs: Low Risk  (2024)    Transportation Needs      Within the past 12 months, has lack of transportation kept you from medical appointments, getting your medicines, non-medical meetings or appointments, work, or from getting things that you need?: No   Physical Activity: Insufficiently Active (2024)    Exercise Vital Sign      Days of Exercise per Week: 3 days      Minutes of Exercise per Session: 30 min   Stress: No Stress Concern Present (2024)    [employer] [place] [employer] - Occupational Stress Questionnaire      Feeling of Stress : Not at all   Social Connections: Unknown (2024)    Social Connection and Isolation Panel [NHANES]      Frequency of Communication with Friends and Family: Not on file      Frequency of Social Gatherings with Friends and Family: Three times a week      Attends Adventism Services: Not on file      Active Member of Clubs or Organizations: Not on file      Attends Club or Organization Meetings: Not on file      Marital Status: Not on file   Interpersonal Safety: Low Risk  (2024)    Interpersonal Safety      Do you feel physically and emotionally safe where you currently live?: Yes      Within the past 12 months, have you been hit, slapped,  kicked or otherwise physically hurt by someone?: No      Within the past 12 months, have you been humiliated or emotionally abused in other ways by your partner or ex-partner?: No   Housing Stability: Low Risk  (12/8/2024)    Housing Stability      Do you have housing? : Yes      Are you worried about losing your housing?: No     Family History   Problem Relation Age of Onset     Hammer toes Mother      Seizure Disorder Mother      Hyperlipidemia Father      Insulin Resistance Father      Parkinsonism Father      Hyperlipidemia Paternal Grandfather      Diabetes Type 2  Paternal Uncle      Peripheral Neuropathy Paternal Uncle      Coronary Artery Disease No family hx of      Cerebrovascular Disease No family hx of      Prostate Cancer No family hx of      Colon Cancer No family hx of                              Subjective findings- 59-year-old returns clinic for right Hallux ulcer with peripheral sensory Neuropathy and Hammertoe.  Relates he has surgery scheduled with Dr. Ceja next week, relates to taking the Doxycycline with no problems, relates doing the wound cares with Iodosorb, relates there is still a little bit of drainage on the dressing.    Objective findings- DP and PT are 2 out of 4 right.  Has dorsally contracted Hallux right.  Has a right distal Hallux eschar with underlying ulceration that is through the Dermis, 1 x 0.5 cm, serosanguineous drainage, mild edema, no erythema, no odor, no calor, no pain on palpation.    Assessment and plan- Right Hallux ulcer secondary to blistering, Hammertoe, peripheral sensory Neuropathy, infection.  Diagnosis and treatment options discussed with the patient.  He has changes consistent with Osteomyelitis on MRI.  Right Hallux ulcer was sharp debrided with a tissue cutter through the Dermis, no anesthesia needed, and local wound care done upon consent with cleaning this with ChloraPrep and applying Soraya, Biatain silver and a sterile dressing.  The ulcer bled well  upon debridement.  Will have him continue cleaning this daily with wound Vashe and applying Iodosorb and Biatain silver and sterile dressing.  Follow-up with surgery as scheduled.  Continue the Doxycycline.  Continue the DH 2 shoe.  Previous notes reviewed.  Return to clinic and see me in 10 to 12 weeks.                                Moderate level of medical decision making.      Again, thank you for allowing me to participate in the care of your patient.        Sincerely,        Ari Rowan DPM    Electronically signed

## 2025-01-15 ENCOUNTER — ANESTHESIA (OUTPATIENT)
Dept: SURGERY | Facility: CLINIC | Age: 60
End: 2025-01-15
Payer: COMMERCIAL

## 2025-01-15 ENCOUNTER — HOSPITAL ENCOUNTER (OUTPATIENT)
Facility: CLINIC | Age: 60
Discharge: HOME | End: 2025-01-15
Attending: ORTHOPAEDIC SURGERY | Admitting: ORTHOPAEDIC SURGERY
Payer: COMMERCIAL

## 2025-01-15 VITALS
HEIGHT: 77 IN | TEMPERATURE: 98 F | OXYGEN SATURATION: 94 % | BODY MASS INDEX: 27.33 KG/M2 | HEART RATE: 53 BPM | WEIGHT: 231.48 LBS | SYSTOLIC BLOOD PRESSURE: 120 MMHG | RESPIRATION RATE: 16 BRPM | DIASTOLIC BLOOD PRESSURE: 72 MMHG

## 2025-01-15 DIAGNOSIS — M86.9 RIGHT HALLUX OSTEOMYELITIS (H): Primary | ICD-10-CM

## 2025-01-15 DIAGNOSIS — M20.41 HAMMERTOE OF RIGHT FOOT: ICD-10-CM

## 2025-01-15 DIAGNOSIS — L97.512 SKIN ULCER OF RIGHT GREAT TOE WITH FAT LAYER EXPOSED (H): ICD-10-CM

## 2025-01-15 LAB
BACTERIA SPEC CULT: NORMAL
GRAM STAIN RESULT: NORMAL
GRAM STAIN RESULT: NORMAL

## 2025-01-15 PROCEDURE — 87070 CULTURE OTHR SPECIMN AEROBIC: CPT | Performed by: ORTHOPAEDIC SURGERY

## 2025-01-15 PROCEDURE — 710N000012 HC RECOVERY PHASE 2, PER MINUTE: Performed by: ORTHOPAEDIC SURGERY

## 2025-01-15 PROCEDURE — 250N000011 HC RX IP 250 OP 636: Performed by: ORTHOPAEDIC SURGERY

## 2025-01-15 PROCEDURE — 250N000013 HC RX MED GY IP 250 OP 250 PS 637: Performed by: ANESTHESIOLOGY

## 2025-01-15 PROCEDURE — 360N000075 HC SURGERY LEVEL 2, PER MIN: Performed by: ORTHOPAEDIC SURGERY

## 2025-01-15 PROCEDURE — 87102 FUNGUS ISOLATION CULTURE: CPT | Performed by: ORTHOPAEDIC SURGERY

## 2025-01-15 PROCEDURE — 87205 SMEAR GRAM STAIN: CPT | Performed by: ORTHOPAEDIC SURGERY

## 2025-01-15 PROCEDURE — 710N000010 HC RECOVERY PHASE 1, LEVEL 2, PER MIN: Performed by: ORTHOPAEDIC SURGERY

## 2025-01-15 PROCEDURE — 370N000017 HC ANESTHESIA TECHNICAL FEE, PER MIN: Performed by: ORTHOPAEDIC SURGERY

## 2025-01-15 PROCEDURE — 87075 CULTR BACTERIA EXCEPT BLOOD: CPT | Performed by: ORTHOPAEDIC SURGERY

## 2025-01-15 PROCEDURE — 999N000141 HC STATISTIC PRE-PROCEDURE NURSING ASSESSMENT: Performed by: ORTHOPAEDIC SURGERY

## 2025-01-15 PROCEDURE — 250N000011 HC RX IP 250 OP 636: Performed by: NURSE ANESTHETIST, CERTIFIED REGISTERED

## 2025-01-15 PROCEDURE — 20240 BONE BIOPSY OPEN SUPERFICIAL: CPT | Mod: GC | Performed by: ORTHOPAEDIC SURGERY

## 2025-01-15 PROCEDURE — 87103 BLOOD FUNGUS CULTURE: CPT | Performed by: ORTHOPAEDIC SURGERY

## 2025-01-15 PROCEDURE — 87116 MYCOBACTERIA CULTURE: CPT | Performed by: ORTHOPAEDIC SURGERY

## 2025-01-15 PROCEDURE — 87206 SMEAR FLUORESCENT/ACID STAI: CPT | Performed by: ORTHOPAEDIC SURGERY

## 2025-01-15 PROCEDURE — 272N000001 HC OR GENERAL SUPPLY STERILE: Performed by: ORTHOPAEDIC SURGERY

## 2025-01-15 PROCEDURE — 250N000009 HC RX 250: Performed by: NURSE ANESTHETIST, CERTIFIED REGISTERED

## 2025-01-15 PROCEDURE — 258N000003 HC RX IP 258 OP 636: Performed by: ANESTHESIOLOGY

## 2025-01-15 RX ORDER — PROPOFOL 10 MG/ML
INJECTION, EMULSION INTRAVENOUS CONTINUOUS PRN
Status: DISCONTINUED | OUTPATIENT
Start: 2025-01-15 | End: 2025-01-15

## 2025-01-15 RX ORDER — ONDANSETRON 2 MG/ML
4 INJECTION INTRAMUSCULAR; INTRAVENOUS EVERY 30 MIN PRN
Status: DISCONTINUED | OUTPATIENT
Start: 2025-01-15 | End: 2025-01-16 | Stop reason: HOSPADM

## 2025-01-15 RX ORDER — DOXYCYCLINE HYCLATE 100 MG
100 TABLET ORAL 2 TIMES DAILY
Qty: 20 TABLET | Refills: 0 | Status: SHIPPED | OUTPATIENT
Start: 2025-01-15 | End: 2025-01-24

## 2025-01-15 RX ORDER — DIPHENHYDRAMINE HYDROCHLORIDE 50 MG/ML
25 INJECTION INTRAMUSCULAR; INTRAVENOUS EVERY 6 HOURS PRN
Status: DISCONTINUED | OUTPATIENT
Start: 2025-01-15 | End: 2025-01-16 | Stop reason: HOSPADM

## 2025-01-15 RX ORDER — ONDANSETRON 2 MG/ML
4 INJECTION INTRAMUSCULAR; INTRAVENOUS EVERY 30 MIN PRN
Status: DISCONTINUED | OUTPATIENT
Start: 2025-01-15 | End: 2025-01-15

## 2025-01-15 RX ORDER — OXYCODONE HYDROCHLORIDE 5 MG/1
5 TABLET ORAL
Status: DISCONTINUED | OUTPATIENT
Start: 2025-01-15 | End: 2025-01-16 | Stop reason: HOSPADM

## 2025-01-15 RX ORDER — ASPIRIN 81 MG/1
81 TABLET ORAL 2 TIMES DAILY
Qty: 60 TABLET | Refills: 0 | Status: SHIPPED | OUTPATIENT
Start: 2025-01-15

## 2025-01-15 RX ORDER — NALOXONE HYDROCHLORIDE 0.4 MG/ML
0.1 INJECTION, SOLUTION INTRAMUSCULAR; INTRAVENOUS; SUBCUTANEOUS
Status: DISCONTINUED | OUTPATIENT
Start: 2025-01-15 | End: 2025-01-16 | Stop reason: HOSPADM

## 2025-01-15 RX ORDER — ACETAMINOPHEN 325 MG/1
975 TABLET ORAL ONCE
Status: COMPLETED | OUTPATIENT
Start: 2025-01-15 | End: 2025-01-15

## 2025-01-15 RX ORDER — SODIUM CHLORIDE, SODIUM LACTATE, POTASSIUM CHLORIDE, CALCIUM CHLORIDE 600; 310; 30; 20 MG/100ML; MG/100ML; MG/100ML; MG/100ML
INJECTION, SOLUTION INTRAVENOUS CONTINUOUS
Status: DISCONTINUED | OUTPATIENT
Start: 2025-01-15 | End: 2025-01-16 | Stop reason: HOSPADM

## 2025-01-15 RX ORDER — CEFAZOLIN SODIUM 1 G/3ML
INJECTION, POWDER, FOR SOLUTION INTRAMUSCULAR; INTRAVENOUS PRN
Status: DISCONTINUED | OUTPATIENT
Start: 2025-01-15 | End: 2025-01-15

## 2025-01-15 RX ORDER — DOXYCYCLINE 100 MG/1
100 CAPSULE ORAL 2 TIMES DAILY
Qty: 20 CAPSULE | Refills: 0 | Status: SHIPPED | OUTPATIENT
Start: 2025-01-15 | End: 2025-01-15

## 2025-01-15 RX ORDER — HYDROMORPHONE HYDROCHLORIDE 1 MG/ML
0.2 INJECTION, SOLUTION INTRAMUSCULAR; INTRAVENOUS; SUBCUTANEOUS EVERY 5 MIN PRN
Status: DISCONTINUED | OUTPATIENT
Start: 2025-01-15 | End: 2025-01-16 | Stop reason: HOSPADM

## 2025-01-15 RX ORDER — DEXAMETHASONE SODIUM PHOSPHATE 4 MG/ML
4 INJECTION, SOLUTION INTRA-ARTICULAR; INTRALESIONAL; INTRAMUSCULAR; INTRAVENOUS; SOFT TISSUE
Status: DISCONTINUED | OUTPATIENT
Start: 2025-01-15 | End: 2025-01-16 | Stop reason: HOSPADM

## 2025-01-15 RX ORDER — BUPIVACAINE HYDROCHLORIDE 5 MG/ML
INJECTION, SOLUTION PERINEURAL PRN
Status: DISCONTINUED | OUTPATIENT
Start: 2025-01-15 | End: 2025-01-15 | Stop reason: HOSPADM

## 2025-01-15 RX ORDER — ONDANSETRON 2 MG/ML
INJECTION INTRAMUSCULAR; INTRAVENOUS PRN
Status: DISCONTINUED | OUTPATIENT
Start: 2025-01-15 | End: 2025-01-15

## 2025-01-15 RX ORDER — LIDOCAINE HYDROCHLORIDE 20 MG/ML
INJECTION, SOLUTION INFILTRATION; PERINEURAL PRN
Status: DISCONTINUED | OUTPATIENT
Start: 2025-01-15 | End: 2025-01-15

## 2025-01-15 RX ORDER — LIDOCAINE 40 MG/G
CREAM TOPICAL
Status: DISCONTINUED | OUTPATIENT
Start: 2025-01-15 | End: 2025-01-15 | Stop reason: HOSPADM

## 2025-01-15 RX ORDER — DIPHENHYDRAMINE HCL 25 MG
25 CAPSULE ORAL EVERY 6 HOURS PRN
Status: DISCONTINUED | OUTPATIENT
Start: 2025-01-15 | End: 2025-01-16 | Stop reason: HOSPADM

## 2025-01-15 RX ORDER — FENTANYL CITRATE 50 UG/ML
25 INJECTION, SOLUTION INTRAMUSCULAR; INTRAVENOUS EVERY 5 MIN PRN
Status: DISCONTINUED | OUTPATIENT
Start: 2025-01-15 | End: 2025-01-16 | Stop reason: HOSPADM

## 2025-01-15 RX ORDER — FENTANYL CITRATE 50 UG/ML
50 INJECTION, SOLUTION INTRAMUSCULAR; INTRAVENOUS EVERY 5 MIN PRN
Status: DISCONTINUED | OUTPATIENT
Start: 2025-01-15 | End: 2025-01-16 | Stop reason: HOSPADM

## 2025-01-15 RX ORDER — ONDANSETRON 4 MG/1
4 TABLET, ORALLY DISINTEGRATING ORAL EVERY 30 MIN PRN
Status: DISCONTINUED | OUTPATIENT
Start: 2025-01-15 | End: 2025-01-15

## 2025-01-15 RX ORDER — HYDROMORPHONE HYDROCHLORIDE 1 MG/ML
0.4 INJECTION, SOLUTION INTRAMUSCULAR; INTRAVENOUS; SUBCUTANEOUS EVERY 5 MIN PRN
Status: DISCONTINUED | OUTPATIENT
Start: 2025-01-15 | End: 2025-01-16 | Stop reason: HOSPADM

## 2025-01-15 RX ORDER — OXYCODONE HYDROCHLORIDE 5 MG/1
5 TABLET ORAL EVERY 6 HOURS PRN
Qty: 12 TABLET | Refills: 0 | Status: SHIPPED | OUTPATIENT
Start: 2025-01-15 | End: 2025-01-18

## 2025-01-15 RX ORDER — SODIUM CHLORIDE, SODIUM LACTATE, POTASSIUM CHLORIDE, CALCIUM CHLORIDE 600; 310; 30; 20 MG/100ML; MG/100ML; MG/100ML; MG/100ML
INJECTION, SOLUTION INTRAVENOUS CONTINUOUS
Status: DISCONTINUED | OUTPATIENT
Start: 2025-01-15 | End: 2025-01-15 | Stop reason: HOSPADM

## 2025-01-15 RX ORDER — ONDANSETRON 4 MG/1
4 TABLET, ORALLY DISINTEGRATING ORAL EVERY 30 MIN PRN
Status: DISCONTINUED | OUTPATIENT
Start: 2025-01-15 | End: 2025-01-16 | Stop reason: HOSPADM

## 2025-01-15 RX ORDER — LABETALOL HYDROCHLORIDE 5 MG/ML
10 INJECTION, SOLUTION INTRAVENOUS
Status: DISCONTINUED | OUTPATIENT
Start: 2025-01-15 | End: 2025-01-16 | Stop reason: HOSPADM

## 2025-01-15 RX ORDER — OXYCODONE HYDROCHLORIDE 10 MG/1
10 TABLET ORAL
Status: DISCONTINUED | OUTPATIENT
Start: 2025-01-15 | End: 2025-01-16 | Stop reason: HOSPADM

## 2025-01-15 RX ORDER — PROPOFOL 10 MG/ML
INJECTION, EMULSION INTRAVENOUS PRN
Status: DISCONTINUED | OUTPATIENT
Start: 2025-01-15 | End: 2025-01-15

## 2025-01-15 RX ORDER — FENTANYL CITRATE 50 UG/ML
INJECTION, SOLUTION INTRAMUSCULAR; INTRAVENOUS PRN
Status: DISCONTINUED | OUTPATIENT
Start: 2025-01-15 | End: 2025-01-15

## 2025-01-15 RX ADMIN — PROPOFOL 50 MG: 10 INJECTION, EMULSION INTRAVENOUS at 21:05

## 2025-01-15 RX ADMIN — SODIUM CHLORIDE, POTASSIUM CHLORIDE, SODIUM LACTATE AND CALCIUM CHLORIDE: 600; 310; 30; 20 INJECTION, SOLUTION INTRAVENOUS at 20:52

## 2025-01-15 RX ADMIN — ACETAMINOPHEN 975 MG: 325 TABLET, FILM COATED ORAL at 22:36

## 2025-01-15 RX ADMIN — LIDOCAINE HYDROCHLORIDE 60 MG: 20 INJECTION, SOLUTION INFILTRATION; PERINEURAL at 21:05

## 2025-01-15 RX ADMIN — FENTANYL CITRATE 25 MCG: 50 INJECTION INTRAMUSCULAR; INTRAVENOUS at 21:26

## 2025-01-15 RX ADMIN — FENTANYL CITRATE 50 MCG: 50 INJECTION INTRAMUSCULAR; INTRAVENOUS at 21:05

## 2025-01-15 RX ADMIN — PROPOFOL 100 MCG/KG/MIN: 10 INJECTION, EMULSION INTRAVENOUS at 21:05

## 2025-01-15 RX ADMIN — ONDANSETRON 4 MG: 2 INJECTION INTRAMUSCULAR; INTRAVENOUS at 21:51

## 2025-01-15 RX ADMIN — MIDAZOLAM 2 MG: 1 INJECTION INTRAMUSCULAR; INTRAVENOUS at 20:52

## 2025-01-15 RX ADMIN — CEFAZOLIN 2 G: 1 INJECTION, POWDER, FOR SOLUTION INTRAMUSCULAR; INTRAVENOUS at 21:14

## 2025-01-15 ASSESSMENT — LIFESTYLE VARIABLES: TOBACCO_USE: 0

## 2025-01-15 ASSESSMENT — ACTIVITIES OF DAILY LIVING (ADL)
ADLS_ACUITY_SCORE: 36
ADLS_ACUITY_SCORE: 36
ADLS_ACUITY_SCORE: 35
ADLS_ACUITY_SCORE: 36
ADLS_ACUITY_SCORE: 36
ADLS_ACUITY_SCORE: 35
ADLS_ACUITY_SCORE: 36
ADLS_ACUITY_SCORE: 36

## 2025-01-15 ASSESSMENT — ENCOUNTER SYMPTOMS: SEIZURES: 0

## 2025-01-15 NOTE — ANESTHESIA PREPROCEDURE EVALUATION
Pre-Operative H & P     CC:  Preoperative exam to assess for increased cardiopulmonary risk while undergoing surgery and anesthesia.    Date of Encounter: 1/6/2025  Primary Care Physician:  Haritha Barton     Reason for visit:   No diagnosis found.      GORDON Garcia is a 59 year old male who presents for pre-operative H & P in preparation for  Procedure Information       Case: [id number] Date/Time: 01/15/25 1708    Procedures:       Debridement and Irrigation of Right Hallux, Possible Placement of Antibiotic Beads or Spacer (Right: Foot)      Flexor Hallucis Longus Tenotomy (Right: Foot)    Anesthesia type: Choice with Block    Diagnosis: Right hallux osteomyelitis (H) [M86.9]    Pre-op diagnosis: Right hallux osteomyelitis (H) [M86.9]    Location:  OR 02 / [facility] OR    Providers: Camilo Ceja MD            The patient presents to the PAC virtually today in preparation for the above scheduled procedure with comorbid conditions including HLD, neuroapthy, GERD and erectile dysfunction.    The patient is followed by Dr. Ceja in the Orthopaedic Surgery clinic for right hallux infection.  The patient has a h/o peripheral neuropathy and chronic recurrent ulceration at the tip of a right fixed hallus malleus deformity.  Dr. Ceja ordered imaging of the right foot for further evaluation.  The MRI scan with findings suspicious for osteomyelitis involving the distal phalanx and the distal aspect of the proximal phalanx.    Dr. Ceja counseled the patient of the findings and treatment options.  The patient has now been scheduled for the procedure as listed above.      History is obtained from the patient and chart review    Hx of abnormal bleeding or anti-platelet use: denies      Past Medical History  Past Medical History:   Diagnosis Date    Constipation     ED (erectile dysfunction)     Gastroesophageal reflux disease without esophagitis     Hammertoe of right foot     Hypercholesteremia     Neuropathy        Past  Surgical History  Past Surgical History:   Procedure Laterality Date    COLONOSCOPY N/A 11/22/2024    Procedure: Colonoscopy with polypectomy;  Surgeon: Karon Guevara MD;  Location: [facility] OR    DISCECTOMY LUMBAR MINIMALLY INVASIVE ONE LEVEL  2000    ROTATOR CUFF REPAIR RT/LT Left     ROTATOR CUFF REPAIR RT/LT Right        Prior to Admission Medications  Current Outpatient Medications   Medication Sig Dispense Refill    alpha-lipoic acid 100 MG capsule Take 100 mg by mouth every morning.      Ascorbic Acid (VITAMIN C PO) Take 500 mg by mouth every morning.      Cholecalciferol (VITAMIN D3 PO) Take 1,000 Units by mouth daily.      Coenzyme Q10 (COQ10 PO) Take 1 tablet by mouth every morning.      doxycycline hyclate (VIBRA-TABS) 100 MG tablet Take 1 tablet (100 mg) by mouth 2 times daily. 60 tablet 0    FOLIC ACID PO Take 1 mg by mouth every morning.      Glucosamine HCl (GLUCOSAMINE PO) Take 1 capsule by mouth every morning.      Multiple Vitamins-Minerals (ZINC PO) Take 1 capsule by mouth every morning. Zinc      Omega-3 Fatty Acids (FISH OIL PO) Take 1 capsule by mouth every morning.      omeprazole (PRILOSEC OTC) 20 MG EC tablet Take 20 mg by mouth daily as needed (heartburn).      pregabalin (LYRICA) 75 MG capsule Take 1 capsule (75 mg) by mouth at bedtime. (Patient taking differently: Take 75 mg by mouth 2 times daily.) 90 capsule 3    rosuvastatin (CRESTOR) 10 MG tablet Take 1 tablet (10 mg) by mouth daily. (Patient taking differently: Take 10 mg by mouth every morning.) 90 tablet 3    sildenafil (VIAGRA) 100 MG tablet Take 1 tablet (100 mg) by mouth daily as needed (30-60 minutes before intercourse for ED). 30 tablet 11       Allergies  No Known Allergies    Social History  Social History     Socioeconomic History    Marital status:      Spouse name: Not on file    Number of children: Not on file    Years of education: Not on file    Highest education level: Not on file   Occupational History    Not  on file   Tobacco Use    Smoking status: Never    Smokeless tobacco: Never   Vaping Use    Vaping status: Never Used   Substance and Sexual Activity    Alcohol use: Yes     Comment: Once a week, 2-3 beers at a time    Drug use: Never    Sexual activity: Yes     Partners: Female     Birth control/protection: Post-menopausal     Comment: exclusive with partner   Other Topics Concern    Not on file   Social History Narrative    Moved to [place]  from [place]    Lives with wife    Works in supply chain distribution, works remotely and travels frequently    One brother in [place], one in [place], [place]     Daughter is local            First wife  in  of colon cancer      Social Drivers of Health     Financial Resource Strain: Low Risk  (2024)    Financial Resource Strain     Within the past 12 months, have you or your family members you live with been unable to get utilities (heat, electricity) when it was really needed?: No   Food Insecurity: Low Risk  (2024)    Food Insecurity     Within the past 12 months, did you worry that your food would run out before you got money to buy more?: No     Within the past 12 months, did the food you bought just not last and you didn t have money to get more?: No   Transportation Needs: Low Risk  (2024)    Transportation Needs     Within the past 12 months, has lack of transportation kept you from medical appointments, getting your medicines, non-medical meetings or appointments, work, or from getting things that you need?: No   Physical Activity: Insufficiently Active (2024)    Exercise Vital Sign     Days of Exercise per Week: 3 days     Minutes of Exercise per Session: 30 min   Stress: No Stress Concern Present (2024)    [employer] [place] [employer] - Occupational Stress Questionnaire     Feeling of Stress : Not at all   Social Connections: Unknown (2024)    Social Connection and Isolation Panel [NHANES]     Frequency of  Communication with Friends and Family: Not on file     Frequency of Social Gatherings with Friends and Family: Three times a week     Attends Roman Catholic Services: Not on file     Active Member of Clubs or Organizations: Not on file     Attends Club or Organization Meetings: Not on file     Marital Status: Not on file   Interpersonal Safety: Low Risk  (12/13/2024)    Interpersonal Safety     Do you feel physically and emotionally safe where you currently live?: Yes     Within the past 12 months, have you been hit, slapped, kicked or otherwise physically hurt by someone?: No     Within the past 12 months, have you been humiliated or emotionally abused in other ways by your partner or ex-partner?: No   Housing Stability: Low Risk  (12/8/2024)    Housing Stability     Do you have housing? : Yes     Are you worried about losing your housing?: No       Family History  Family History   Problem Relation Age of Onset    Hammer toes Mother     Seizure Disorder Mother     Hyperlipidemia Father     Insulin Resistance Father     Parkinsonism Father     Hyperlipidemia Paternal Grandfather     Diabetes Type 2  Paternal Uncle     Peripheral Neuropathy Paternal Uncle     Coronary Artery Disease No family hx of     Cerebrovascular Disease No family hx of     Prostate Cancer No family hx of     Colon Cancer No family hx of        Review of Systems  The complete review of systems is negative other than noted in the HPI or here.   Anesthesia Evaluation   Pt has had prior anesthetic. Type: General.    History of anesthetic complications  - .  Slow to wake.    ROS/MED HX  ENT/Pulmonary:     (+)     SUSIE risk factors,                                (-) tobacco use and asthma   Neurologic:     (+)    peripheral neuropathy,                         (-) no seizures, no CVA and no TIA   Cardiovascular: Comment: Denies cardiac symptoms including chest pain, SOB, palpitations, syncope, DE ANDA, orthopnea, or PND.        (+) Dyslipidemia - -   -  - -                                    (-) hypertension and taking anticoagulants/antiplatelets   METS/Exercise Tolerance: >4 METS Comment: Riding stationary bike for 30 minutes most days of week.    Hematologic:    (-) history of blood clots, anemia and history of blood transfusion   Musculoskeletal: Comment:  Right Hallux ulcer secondary to blistering, Hammertoe, peripheral sensory Neuropathy, infection.     H/o Bilateral rotator cuff surgery.       GI/Hepatic:    (-) GERD and liver disease   Renal/Genitourinary:    (-) renal disease   Endo:    (-) Type I DM, Type II DM, thyroid disease, chronic steroid usage and obesity   Psychiatric/Substance Use:    (-) psychiatric history, alcohol abuse history and chronic opioid use history   Infectious Disease:  - neg infectious disease ROS     Malignancy:  - neg malignancy ROS     Other:  - neg other ROS    (+)  , no H/O Chronic Pain,         Virtual visit -  No vitals were obtained    Physical Exam  Constitutional: Awake, alert, cooperative, no apparent distress, and appears stated age.  Eyes: Pupils equal  HENT: Normocephalic  Respiratory: non labored breathing   Neurologic: Awake, alert, oriented to name, place and time.   Neuropsychiatric: Calm, cooperative. Normal affect.      Prior Labs/Diagnostic Studies   All labs and imaging personally reviewed    Latest Reference Range & Units 12/13/24 11:03   Sodium 135 - 145 mmol/L 141   Potassium 3.4 - 5.3 mmol/L 4.4   Chloride 98 - 107 mmol/L 105   Carbon Dioxide (CO2) 22 - 29 mmol/L 28   Urea Nitrogen 8.0 - 23.0 mg/dL 14.0   Creatinine 0.67 - 1.17 mg/dL 0.93   GFR Estimate >60 mL/min/1.73m2 >90   Calcium 8.8 - 10.4 mg/dL 9.2   Anion Gap 7 - 15 mmol/L 8   Cholesterol <200 mg/dL 170   Patient Fasting?  No  No   Glucose 70 - 99 mg/dL 110 (H)   HDL Cholesterol >=40 mg/dL 55   Estimated Average Glucose <117 mg/dL 114   Hemoglobin A1C 0.0 - 5.6 % 5.6   LDL Cholesterol Calculated <100 mg/dL 75   Non HDL Cholesterol <130 mg/dL 115  "  Triglycerides <150 mg/dL 201 (H)   (H): Data is abnormally high  CBC RESULTS: No results for input(s): \"WBC\", \"RBC\", \"HGB\", \"HCT\", \"MCV\", \"MCH\", \"MCHC\", \"RDW\", \"PLT\" in the last [id number] hours.    Last Comprehensive Metabolic Panel:  Recent Labs   Lab Test 12/13/24  1103 05/18/22  1005 09/17/21  1214    141  --    POTASSIUM 4.4 4.2  --    CHLORIDE 105 106  --    CO2 28 28  --    ANIONGAP 8 7  --    BUN 14.0 14  --    CR 0.93 1.07  --    GFRESTIMATED >90 81  --    * 98 104*   JORDI 9.2 9.5  --           PROCEDURES  MRI of the right forefoot dated 12/11/2024.     COMPARISON: 10/29/2024.     CLINICAL HISTORY: Hallux malleus, evaluate for osteomyelitis.                                                                   IMPRESSION:  1. Diffuse bone marrow edema within the distal phalanx with a subtle  focus of bone marrow edema within the mid to distal proximal phalanx  of the great toe. Additionally there is subtle low T1 signal involving  the most distal aspect of the distal phalanx of the great toe, with  loss of the cortical bone signal on the T1-weighted sequences, MRI  findings most consistent with osteomyelitis.     2. No discrete soft tissue mass or fluid collection.     3. Mild diffuse muscle atrophy with diffuse muscular edema which may  represent myositis versus microangiopathy.     TRISH FISHER MD       Bilateral lower extremity resting ankle brachial indices dated  12/11/2024 11:33 AM     Comparison study: None.     Clinical history: Neuropathy.                                                          Impression:      Right leg: Resting LEONARDO is 1.22. Normal.     Left leg: Resting LEONARDO is 1.17. Normal.     EKG/ stress test - if available please see in ROS above   No results found.       No data to display                  The patient's records and results personally reviewed by this provider.     Outside records reviewed from: Care Everywhere      Assessment    Markell Garcia is a 59 year old " "male seen as a PAC referral for risk assessment and optimization for anesthesia.    Plan/Recommendations  Pt will be optimized for the proposed procedure.  See below for details on the assessment, risk, and preoperative recommendations    NEUROLOGY  - No history of TIA, CVA or seizure    - Neuropathy of feet  Lyrica and folic acid    -Post Op delirium risk factors:  No risk identified    ENT  - No current airway concerns.  Will need to be reassessed day of surgery.  Mallampati: Unable to assess  TM: Unable to assess    CARDIAC  - No history of CAD, Hypertension, and Afib    - HLD  Continue statin as prescribed    -  Denies cardiac symptoms.    - METS (Metabolic Equivalents)  Patient performs 4 or more METS exercise without symptoms             Total Score: 0      - RCRI-Very low risk: Class 1 0.4% complication rate             Total Score: 0        PULMONARY  - SUSIE Low Risk             Total Score: 2    SUSIE: Over 50 ys old    SUSIE: Male      - Denies asthma or inhaler use    - Tobacco History    History   Smoking Status    Never   Smokeless Tobacco    Never       GI  - GERD  Controlled on medications: Proton Pump Inhibitor    - PONV Medium Risk  Total Score: 2           1 AN PONV: Patient is not a current smoker    1 AN PONV: Intended Post Op Opioids        /RENAL  - Baseline Creatinine  see above     - erectile dysfunction  Sildenafil    ENDOCRINE    - BMI: Estimated body mass index is 27.51 kg/m  as calculated from the following:    Height as of 1/6/25: 1.956 m (6' 5\").    Weight as of 1/6/25: 105.2 kg (232 lb).  Overweight (BMI 25.0-29.9)    - No history of Diabetes Mellitus    HEME  VTE Low Risk 0.5%             Total Score: 3    VTE: Greater than 59 yrs old    VTE: Male      - No history of abnormal bleeding or antiplatelet use.    - Denies a h/o anemia or previous blood transfusion      MSK  -  Right hallux infection suspected osteomyelitis  Doxycycline  Above procedure scheduled    - h/o b/l rotator cuff " repair    - Patient is NOT Frail             Total Score: 1    Frailty: Slower walking speed          Different anesthesia methods/types have been discussed with the patient, but they are aware that the final plan will be decided by the assigned anesthesia provider on the date of service.      The patient is optimized for their procedure. AVS with information on surgery time/arrival time, meds and NPO status given by nursing staff. No further diagnostic testing indicated.    Please refer to the physical examination documented by the anesthesiologist in the anesthesia record on the day of surgery.    Video-Visit Details    Type of service:  Video Visit    Provider received verbal consent for a Video Visit from the patient? Yes     Originating Location (pt. Location): Home    Distant Location (provider location):  Off-site  Mode of Communication:  Video Conference via [employer]  On the day of service:     Prep time: 13 minutes  Visit time: 15 minutes  Documentation time: 18 minutes  ------------------------------------------  Total time: 46 minutes      LARISSA Hyman CNP  Preoperative Assessment Center  [facility]  Phone: [phone]  Fax: [phone]    Physical Exam    Airway  airway exam normal      Mallampati: I   TM distance: > 3 FB   Neck ROM: full   Mouth opening: > 3 cm    Respiratory Devices and Support         Dental       (+) Modest Abnormalities - crowns, retainers, 1 or 2 missing teeth      Cardiovascular   cardiovascular exam normal       Rhythm and rate: regular and normal     Pulmonary   pulmonary exam normal        breath sounds clear to auscultation       Other findings: [unfilled]     Anesthesia Plan    ASA Status:  3    NPO Status:  NPO Appropriate    Anesthesia Type: MAC.     - Reason for MAC: straight local not clinically adequate   Induction: Propofol.   Maintenance: TIVA.        Consents    Anesthesia Plan(s) and associated risks,  benefits, and realistic alternatives discussed. Questions answered and patient/representative(s) expressed understanding.     - Discussed:     - Discussed with:  Patient       Use of blood products discussed: No .     Postoperative Care    Pain management: Multi-modal analgesia, Oral pain medications.   PONV prophylaxis: Ondansetron (or other 5HT-3), Dexamethasone or Solumedrol     Comments:    Other Comments: Risks and benefits of a GA discussed with the patient

## 2025-01-16 ENCOUNTER — PRE VISIT (OUTPATIENT)
Dept: SURGERY | Facility: CLINIC | Age: 60
End: 2025-01-16
Payer: COMMERCIAL

## 2025-01-16 LAB
BACTERIA TISS BX CULT: NORMAL

## 2025-01-16 NOTE — PROGRESS NOTES
Orthopaedic Surgery Attending    I saw and examined this pleasant 59 year old patient preoperatively today in the preop area at the [facility] with his spouse at the bedside. The surgical plan for debridement and irrigation of the right hallux, possible placement of antibiotic beads, and possible flexor hallucis longus tenotomy, including what the surgery involves, the risks, benefits, and alternatives, the postoperative plan, and realistic expectations for outcome, were all discussed in layman's terms. No guarantees were expressed or implied as to outcome. The potential downsides of losing FHL function was discussed including some loss of pushoff strength and some increased difficulty with certain activities, and the rationale for including it was discussed. It was also discussed that the surgery could be done without it, but since it appears to be a deforming force pulling the tip of the toe downwards, cutting it may lead to a higher chance of the wound healing at the ulcer at the tip of his hallux. The possibility, not guaranteed, that if the deformity is thus reduced the ulcer might heal without doing a later IP joint fusion was discussed.  [place] agreement on including FHL tenotomy. The correct surgical site was marked with patient participation. All questions were answered.

## 2025-01-16 NOTE — ANESTHESIA CARE TRANSFER NOTE
Patient: Markell Garcia    Procedure: Procedure(s):  Debridement and Irrigation of Right Hallux       Diagnosis: Right hallux osteomyelitis (H) [M86.9]  Diagnosis Additional Information: No value filed.    Anesthesia Type:   MAC     Note:    Oropharynx: oropharynx clear of all foreign objects and spontaneously breathing  Level of Consciousness: drowsy  Oxygen Supplementation: face mask  Level of Supplemental Oxygen (L/min / FiO2): 7  Independent Airway: airway patency satisfactory and stable  Dentition: dentition unchanged  Vital Signs Stable: post-procedure vital signs reviewed and stable  Report to RN Given: handoff report given  Patient transferred to: PACU    Handoff Report: Identifed the Patient, Identified the Reponsible Provider, Reviewed the pertinent medical history, Discussed the surgical course, Reviewed Intra-OP anesthesia mangement and issues during anesthesia, Set expectations for post-procedure period and Allowed opportunity for questions and acknowledgement of understanding    Vitals:  Vitals Value Taken Time   /71 01/15/25 2200   Temp 98.3    Pulse 58 01/15/25 2201   Resp     SpO2 97 % 01/15/25 2201   Vitals shown include unfiled device data.    Electronically Signed By: LARISSA Monreal CRNA  January 15, 2025  10:02 PM

## 2025-01-16 NOTE — OP NOTE
DATE OF SURGERY:  01/15/2025.    PREOPERATIVE DIAGNOSIS:  Right hallux ulcer and underlying osteomyelitis, in the setting of claw hallux and cavus foot deformities.    POSTOPERATIVE DIAGNOSIS:  Right hallux ulcer and underlying osteomyelitis, in the setting of claw hallux and cavus foot deformities.    PROCEDURE:  Debridement and irrigation of right hallux.    PRIMARY SURGEON:  Camilo Ceja MD.    ASSISTANT SURGEON(S):  Darryl Tidwell MD.    ANESTHESIA:  Monitored anesthesia care and local (5 mL of 0.25% plain bupicavaine without epinephrine).    COMPLICATIONS:  None apparent intraoperatively or immediately postoperatively.    IMPLANTS:  None.    SIGNIFICANT FINDINGS:  No fluid collection or purulence noted. Bone appeared to be healthy and without defects or cavities.  Area 1 cm mediolateral x 0.5 cm dorsoplantar x 0.5 cm deep.    SPECIMENS SENT:  Bone sample from right hallux distal phalanx sent to microbiology for gram stain, aerobic culture, anaerobic culture, fungal culture, and AFB stain/culture.    SPECIMENS NOT SENT:  None.    DRAINS:  None.    TOURNIQUET TIME: 0.    ESTIMATED BLOOD LOSS:  1 mL.      INDICATIONS:  Markell Garcia is a very pleasant 59 year old male patient with chronic callosity and ulceration of the right hallux distal phalanx tip in the setting of multiple right claw toe deformities and right cavus foot deformity, with MRI findings showing underlying distal phalangeal osteomyelitis.  The diagnosis and expected prognosis, nature of the recommended procedure, the risks, benefits, and alternatives, the postoperative plan, and realistic expectations for short and long term outcome were all discussed with the patient in layman's terms.  No guarantees were expressed or implied as to outcome.  The patient had the opportunity to have all the questions he had at the time asked and answered appropriately, verbalized understanding of this discussion, and verbalized the wish to proceed with the  planned procedure.  Written informed consent was obtained.     DESCRIPTION OF PROCEDURE:             The patient, Markell Garcia, was met in the preoperative area where the correct surgical site was identified with patient participation and marked by the primary surgeon.  All questions were answered.  The patient was then brought to the operating room and was safely transferred to operating table in the supine position with all bony prominences well padded and a safety strap across the waist.  The anesthesia team performed monitored anesthesia care.  Cefazolin was administered IV per protocol.  Venous thromboembolic prophylaxis was performed with a sequential device on the nonoperative lower extremity.  A tourniquet was placed on the thigh of the operative lower extremity.  The operative lower extremity was then prepped and draped free in the usual sterile fashion.  Prior to the start of surgery, a multidisciplinary time out was performed per hospital protocol confirming among the other items, the correct patient identity, procedure, body part, and laterality.  All in the room verbalized agreement.     ***     There was no evidence for any vascular injury during this case.  Meticulous hemostasis was achieved.  The wound was thoroughly irrigated and then closed with nylon suture.  The incision was carefully cleansed with sterile saline and dried.  Sterile dressings and a postop shoe were then applied.  All surgical counts were reported as correct at the end of the case.  The patient was taken to the recovery room in stable condition.  I was present in the room and scrubbed in for the entire case.      POSTOPERATIVE PLAN:     Discharge home today from PACU once criteria met.  Diet:  Clear liquids and advance as tolerated.  Antibiotics:  Discharge home with doxycycline for two weeks. Tailor antibiotic therapy as guided by culture results.  Pain management:  Oral medications will be prescribed for  discharge.  Weightbearing status:  Nonweightbearing on the operative lower extremity for two weeks, using appropriate assistive gait devices and assistance for ambulation, though may heel walk on operative foot for short distances for ADL's only.  Activity:  May be up ad xochitl for ADLs but encourage flat bedrest with the foot and ankle of the operative lower extremity elevated above the level of the heart and iced as much of the time as possible for the first 2 weeks postop.  Elevate heels off bed.  Up with assist until independent.  Labs:  Follow culture results.  Xrays:  None at this time.  Immobilization:  Keep postop shoe clean, dry, and intact.  Dressings:  Dressings will be removed at the first clinic follow-up visit at 2 weeks postop.  DVT prophylaxis:   mg po daily until 6 weeks postoperative.  Follow up:   Two weeks postop in orthopaedic clinic for dressing change, wound check, and possible suture removal if appropriate to do so at that time.  Six weeks postop in ortho clinic for weightbearing radiographs of the operative foot.  Plan on potential right claw hallux reconstruction with possible IP fusion and EHL tendon transfer.      Camilo Ceja MD  Attending surgeon  Orthopaedic Surgery     doxycycline for two weeks. Tailor antibiotic therapy as guided by culture results.  Pain management:  Oral medications will be prescribed for discharge.  Weightbearing status:  Nonweightbearing on the operative lower extremity for two weeks, using appropriate assistive gait devices and assistance for ambulation, though may heel walk on operative foot for short distances for ADL's only.  Activity:  May be up ad xochitl for ADLs but encourage flat bedrest with the foot and ankle of the operative lower extremity elevated above the level of the heart and iced as much of the time as possible for the first 2 weeks postop.  Elevate heels off bed.  Up with assist until independent.  Labs:  Follow culture results.  Xrays:  None at this time.  Immobilization:  Keep postop shoe clean, dry, and intact.  Dressings:  Dressings will be removed at the first clinic follow-up visit at 2 weeks postop.  DVT prophylaxis:   mg po daily until 6 weeks postoperative.  Follow up:   Two weeks postop in orthopaedic clinic for dressing change, wound check, and possible suture removal if appropriate to do so at that time.  Six weeks postop in ortho clinic for weightbearing radiographs of the operative foot.  Plan on potential right claw hallux reconstruction with possible IP fusion and EHL tendon transfer.      Camilo Ceja MD  Attending surgeon  Orthopaedic Surgery

## 2025-01-16 NOTE — DISCHARGE INSTRUCTIONS
- Weight bear as tolerated in postop shoe through heel, ADLs only  - Tylenol, ibuprofen, oxycodone for pain control  - Keep dressing on until clinic  - Will follow up bone culture results  - PO doxycycline for 10 days  - Follow up in clinic within 2 weeks

## 2025-01-16 NOTE — ANESTHESIA POSTPROCEDURE EVALUATION
Patient: Markell Garcia    Procedure: Procedure(s):  Debridement and Irrigation of Right Hallux       Anesthesia Type:  MAC    Note:  Disposition: Outpatient   Postop Pain Control: Uneventful            Sign Out: Well controlled pain   PONV: No   Neuro/Psych: Uneventful            Sign Out: Acceptable/Baseline neuro status   Airway/Respiratory: Uneventful            Sign Out: Acceptable/Baseline resp. status   CV/Hemodynamics: Uneventful            Sign Out: Acceptable CV status; No obvious hypovolemia; No obvious fluid overload   Other NRE: NONE   DID A NON-ROUTINE EVENT OCCUR? No           Last vitals:  Vitals Value Taken Time   /77 01/15/25 2215   Temp 36.8  C (98.3  F) 01/15/25 2200   Pulse 56 01/15/25 2215   Resp 15 01/15/25 2215   SpO2 94 % 01/15/25 2215       Electronically Signed By: Ben Aragon DO  January 15, 2025  10:31 PM

## 2025-01-16 NOTE — BRIEF OP NOTE
[facility]    Brief Operative Note    Pre-operative diagnosis: Right hallux osteomyelitis (H) [M86.9]  Post-operative diagnosis Same as pre-operative diagnosis    Procedure: Debridement and Irrigation of Right Hallux, Right - Foot    Surgeon: Surgeons and Role:     * Cmailo Ceja MD - Primary     * Darryl Tidwell MD - Resident - Assisting     * Krzysztof Alexander MD - Resident - Assisting  Anesthesia: Choice with Block   Estimated Blood Loss: Less than 10 ml    Drains: None  Specimens:   ID Type Source Tests Collected by Time Destination   A : Right Hallux cultures Wound Toe, Right AFB CULTURE AND STAIN NON BLOOD, ANAEROBIC BACTERIAL CULTURE ROUTINE, GRAM STAIN, FUNGAL OR YEAST CULTURE ROUTINE, AEROBIC BACTERIAL CULTURE ROUTINE Camilo Ceja MD 1/15/2025  9:35 PM      Findings:   See op note .  Complications: None.  Implants: * No implants in log *      Plan:  - Same day surgery  - Weight bear as tolerated in postop shoe through heel, ADLs only  - Tylenol, ibuprofen, oxycodone for pain control  - Keep dressing on until clinic  - Will follow up bone culture results  - PO doxycycline for 10 days  - Follow up in clinic within 2 weeks

## 2025-01-18 LAB — ACID FAST STAIN (ARUP): NORMAL

## 2025-01-20 LAB — BACTERIA TISS BX CULT: NO GROWTH

## 2025-01-23 LAB
BACTERIA TISS BX CULT: ABNORMAL
BACTERIA TISS BX CULT: NORMAL

## 2025-01-30 LAB — BACTERIA TISS BX CULT: NORMAL

## 2025-01-31 ENCOUNTER — OFFICE VISIT (OUTPATIENT)
Dept: ORTHOPEDICS | Facility: CLINIC | Age: 60
End: 2025-01-31
Payer: COMMERCIAL

## 2025-01-31 DIAGNOSIS — L97.512 SKIN ULCER OF RIGHT GREAT TOE WITH FAT LAYER EXPOSED (H): Primary | ICD-10-CM

## 2025-01-31 DIAGNOSIS — M20.31 HALLUX MALLEUS OF RIGHT FOOT: ICD-10-CM

## 2025-01-31 PROCEDURE — 99213 OFFICE O/P EST LOW 20 MIN: CPT | Performed by: PHYSICIAN ASSISTANT

## 2025-01-31 NOTE — NURSING NOTE
Reason For Visit:   Chief Complaint   Patient presents with    Surgical Followup     2 week Right hallux I&D       There were no vitals taken for this visit.         Cyndie Anderson ATC

## 2025-01-31 NOTE — LETTER
1/31/2025      Markell Garcia  [address] [address]      Dear Colleague,    Thank you for referring your patient, Markell Garcia, to the [facility] [place]. Please see a copy of my visit note below.    Subjective  WHAT:  Right Hallux   WHEN: 1/15/2025  HOW:  Status post debridement and irrigation of right hallux for right hallux ulcer and underlying osteomyelitis, in the setting of claw hallux and cavus foot deformities.     Markell is two weeks status post debridement and irrigation of right hallux. Overall the patient notes they are doing well.  The patient reports pain is  mild.  Markell reports minimal swelling and currently ambulates for ADL's in post-op shoe WBAT through heel.  Markell is not taking Doxycycline as operative cultures returned negative for growth. No complications reported since surgery    Patient denies fevers, chills, worsening pain, incisional drainage or worrisome redness. Denies calf pain or tenderness, chest pain or shortness of breath.     Objective  Musculoskeletal:  Right Hallux  Skin: Dressing removed, skin intact, no rashes or lesions, previous site of ulcer is dry with eschar. Sutures removed with difficulty due to overlying eschar, Hallux without erythema, fluctuance, induration, odor or drainage.   Effusion:none  Swelling:minimal  Edema:None  Atrophy:absent  Deformity:flexion contracture deformity at the IP joint   Tenderness: Without TTP  Gait: post-op shoe, Heel WB    Neurovascular:  Intact motor strength  Light touch sensation diminished throughout hallux  Foot is WWP  DP/PT pulses palpated    Imaging   No images reviewed today    Assessment  Status post debridement and irrigation of right hallux for right hallux ulcer and underlying osteomyelitis, in the setting of claw hallux and cavus foot deformities completed on 01/15/2025, sutures removed, healing well without signs of infection.     Plan  Markell is doing well overall  postoperatively.  A thorough discussion was had today with the patient regarding the surgical procedure and continued precautions.  Sutures removed with some difficulty due to overlying eschar and suture breakage when pulled. Incision/wound care and dressing care discussed. Continue to monitor for new or concerning symptoms. Markell may slowly begin progressing weight bearing activities through forefoot, but should continue elevating as much as possible. Markell will follow-up in 4 weeks for re-assessment, x-rays and surgical discussion for right hallux deformity correction and right hallux IP joint fusion to reduce the hammertoe deformity and lessen the chance for recurrent ulceration. All other questions were answered today.       Again, thank you for allowing me to participate in the care of your patient.        Sincerely,        Lorrie Zelaya PA-C    Electronically signed

## 2025-02-06 LAB — BACTERIA TISS BX CULT: NORMAL

## 2025-02-11 NOTE — PROGRESS NOTES
Subjective  WHAT:  Right Hallux   WHEN: 1/15/2025  HOW:  Status post debridement and irrigation of right hallux for right hallux ulcer and underlying osteomyelitis, in the setting of claw hallux and cavus foot deformities.     Markell is two weeks status post debridement and irrigation of right hallux. Overall the patient notes they are doing well.  The patient reports pain is  mild.  Markell reports minimal swelling and currently ambulates for ADL's in post-op shoe WBAT through heel.  Markell is not taking Doxycycline as operative cultures returned negative for growth. No complications reported since surgery    Patient denies fevers, chills, worsening pain, incisional drainage or worrisome redness. Denies calf pain or tenderness, chest pain or shortness of breath.     Objective  Musculoskeletal:  Right Hallux  Skin: Dressing removed, skin intact, no rashes or lesions, previous site of ulcer is dry with eschar. Sutures removed with difficulty due to overlying eschar, Hallux without erythema, fluctuance, induration, odor or drainage.   Effusion:none  Swelling:minimal  Edema:None  Atrophy:absent  Deformity:flexion contracture deformity at the IP joint   Tenderness: Without TTP  Gait: post-op shoe, Heel WB    Neurovascular:  Intact motor strength  Light touch sensation diminished throughout hallux  Foot is WWP  DP/PT pulses palpated    Imaging   No images reviewed today    Assessment  Status post debridement and irrigation of right hallux for right hallux ulcer and underlying osteomyelitis, in the setting of claw hallux and cavus foot deformities completed on 01/15/2025, sutures removed, healing well without signs of infection.     Plan  Markell is doing well overall postoperatively.  A thorough discussion was had today with the patient regarding the surgical procedure and continued precautions.  Sutures removed with some difficulty due to overlying eschar and suture breakage when pulled. Incision/wound care and dressing care  discussed. Continue to monitor for new or concerning symptoms. Markell may slowly begin progressing weight bearing activities through forefoot, but should continue elevating as much as possible. Markell will follow-up in 4 weeks for re-assessment, x-rays and surgical discussion for right hallux deformity correction and right hallux IP joint fusion to reduce the hammertoe deformity and lessen the chance for recurrent ulceration. All other questions were answered today.

## 2025-02-12 LAB — BACTERIA TISS BX CULT: NO GROWTH

## 2025-02-20 DIAGNOSIS — Z98.890 S/P FOOT SURGERY, RIGHT: Primary | ICD-10-CM

## 2025-02-28 ENCOUNTER — ANCILLARY PROCEDURE (OUTPATIENT)
Dept: GENERAL RADIOLOGY | Facility: CLINIC | Age: 60
End: 2025-02-28
Attending: ORTHOPAEDIC SURGERY
Payer: COMMERCIAL

## 2025-02-28 ENCOUNTER — OFFICE VISIT (OUTPATIENT)
Dept: ORTHOPEDICS | Facility: CLINIC | Age: 60
End: 2025-02-28
Payer: COMMERCIAL

## 2025-02-28 DIAGNOSIS — Z98.890 S/P FOOT SURGERY, RIGHT: ICD-10-CM

## 2025-02-28 DIAGNOSIS — Q66.70 PES CAVUS: ICD-10-CM

## 2025-02-28 DIAGNOSIS — M86.9 RIGHT HALLUX OSTEOMYELITIS (H): Primary | ICD-10-CM

## 2025-02-28 DIAGNOSIS — L97.512 SKIN ULCER OF RIGHT GREAT TOE WITH FAT LAYER EXPOSED (H): ICD-10-CM

## 2025-02-28 PROCEDURE — 99214 OFFICE O/P EST MOD 30 MIN: CPT | Performed by: ORTHOPAEDIC SURGERY

## 2025-02-28 PROCEDURE — 73630 X-RAY EXAM OF FOOT: CPT | Mod: RT | Performed by: RADIOLOGY

## 2025-02-28 NOTE — NURSING NOTE
Reason For Visit:   Chief Complaint   Patient presents with    RECHECK     Patient returns 6w 2d s/p I&D of right hallux.  Patient reports the right foot is doing well.  Things have healed well.  He presents today walking without assistance and wearing a post op shoe.         There were no vitals taken for this visit.    Pain Assessment  Patient Currently in Pain: No    Ganesh Seth, ATC

## 2025-02-28 NOTE — LETTER
2/28/2025      Markell Garcia  [address] [facility] [address]      Dear Colleague,    Thank you for referring your patient, Markell Garcia, to the [facility] [place]. Please see a copy of my visit note below.    Orthopaedic Surgery Clinic Follow-up Appointment    Chief Complaint: Follow-up right hallux I&D.  DOS: 01/15/2025,  R hallux I&D.  Culture: C acnes, broth only.    History:  I saw this very pleasant 59-year-old today in follow-up approximately 6 weeks 2 days status post I&D of a right hallux ulcer.  He reports the foot is doing well and things have healed well.  He presents today walking without assistance and wearing postop shoe.  He denies any pain at this time.  He denies fevers or chills.  He denies any drainage.  He has been as a precaution covering the toe with a dressing prevent recurrent ulceration.  Cultures did grow out C acnes in the broth only 5 days postop, and otherwise negative.  This was discussed with Dr. Loera of ID and it appears to been a contaminant so no need to treat.  I had previously thought the FHL tenotomy might help correct his claw hallux deformity to prevent recurrence, but it may be that the hallux deformity is more due to to long extensor/intrinsic imbalance secondary to his cavus foot and neuropathy, rather than an FHL contracture.  Accordingly, a future Kolb procedure may be a more direct way to address his deformity and help prevent recurrence, rather than FHL tenotomy, given his relatively younger age and and higher activity level    Exam:  Examination shows the surgical site to be well-healed and dry with a small scab.  No erythema or dehiscence.  No drainage.  The right hallux has a flexible, correctable claw deformity with dorsiflexion at the MTP joint and plantarflexion of the IP joint.  This appears to be on exam secondary to a long extensor/intrinsic imbalance, rather than an FHL contracture.  The toe is nontender to  palpation.    Imaging:  Independent review of imaging was performed including weightbearing AP, oblique, and lateral right foot radiographs dated today, 2/28/2025.  Images were discussed with and shown to the patient.  There is a claw toe deformity to the right hallux with postoperative changes evident at the tip of the distal phalanx.    Labs:  01/15/2025 R hallux bone culture: C acnes, broth only,  5 days postop, otherwise negative cultures. Per Dr. Loera no need to treat.    Impression:  - Right hallux ulcer, now approximately 6 weeks status post I&D with no evident intraoperative findings to support osteomyelitis.  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    Plan:  - Findings and treatment plan discussed with Mr. Garcia.  - Discussed positive intraoperative culture suspected to be a contaminant especially in light of intraoperative findings  - Likely underlying etiology of the hallux ulcer was discussed and potential options to try to promote healing and diminish the likelihood for recurrence, both operative and nonoperative.  - One pair custom foot orthoses, semirigid, with arch support and pressure-relief underneath the tip of the right hallux to try to heal and prevent recurrence of right hallux ulcer.  - I discussed the option of surgery to try to diminish the underlying deforming force likely causing his claw hallux. We'd previously discussed the option of an FHL tendon release, but the IP joint doesn't appear to have a fixed plantarflexion contracture and it doesn't appear to be due to the FHL; furthermore, given his activity and functional demands he'd expressed some concerns about the loss of pushoff strength from releasing the FHL. It appears more likely that his long extensor (EHL) may be more of an underlying factor behind the claw hallux, and removing this as a deforming force may be a better option.  Risks,  benefits, and alternatives to a right hallux reconstruction including interphalangeal joint fusion and transfer of the long extensor (EHL) to the proximal phalanx (Kolb procedure), were discussed in layman's terms.  No guarantees were expressed or implied as to outcome.  I discussed that this is an option at this point time or in the future.  I discussed the option for not performing of the surgery if he is not having symptoms, and te risk for recurrence.  I discussed the rationale for performing the surgery even though things are doing well now without pain, due to the risk for recurrent ulceration.  Signs any symptoms that should warrant immediate medical attention were discussed.  I discussed that if ulceration were to recur before the planned surgery it may have to be temporarily paused until the ulcer resolves again.  All questions that this very pleasant gentleman at this time were answered.      Again, thank you for allowing me to participate in the care of your patient.        Sincerely,        Camilo Ceja MD    Electronically signed

## 2025-02-28 NOTE — PROGRESS NOTES
Orthopaedic Surgery Clinic Follow-up Appointment    Chief Complaint: Follow-up right hallux I&D.  DOS: 01/15/2025,  R hallux I&D.  Culture: C acnes, broth only.    History:  I saw this very pleasant 59-year-old today in follow-up approximately 6 weeks 2 days status post I&D of a right hallux ulcer.  He reports the foot is doing well and things have healed well.  He presents today walking without assistance and wearing postop shoe.  He denies any pain at this time.  He denies fevers or chills.  He denies any drainage.  He has been as a precaution covering the toe with a dressing prevent recurrent ulceration.  Cultures did grow out C acnes in the broth only 5 days postop, and otherwise negative.  This was discussed with Dr. Loera of ID and it appears to been a contaminant so no need to treat.  I had previously thought the FHL tenotomy might help correct his claw hallux deformity to prevent recurrence, but it may be that the hallux deformity is more due to to long extensor/intrinsic imbalance secondary to his cavus foot and neuropathy, rather than an FHL contracture.  Accordingly, a future Kolb procedure may be a more direct way to address his deformity and help prevent recurrence, rather than FHL tenotomy, given his relatively younger age and and higher activity level    Exam:  Examination shows the surgical site to be well-healed and dry with a small scab.  No erythema or dehiscence.  No drainage.  The right hallux has a flexible, correctable claw deformity with dorsiflexion at the MTP joint and plantarflexion of the IP joint.  This appears to be on exam secondary to a long extensor/intrinsic imbalance, rather than an FHL contracture.  The toe is nontender to palpation.    Imaging:  Independent review of imaging was performed including weightbearing AP, oblique, and lateral right foot radiographs dated today, 2/28/2025.  Images were discussed with and shown to the patient.  There is a claw toe deformity to the right  hallux with postoperative changes evident at the tip of the distal phalanx.    Labs:  01/15/2025 R hallux bone culture: C acnes, broth only,  5 days postop, otherwise negative cultures. Per Dr. Loera no need to treat.    Impression:  - Right hallux ulcer, now approximately 6 weeks status post I&D with no evident intraoperative findings to support osteomyelitis.  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    Plan:  - Findings and treatment plan discussed with Mr. Garcia.  - Discussed positive intraoperative culture suspected to be a contaminant especially in light of intraoperative findings  - Likely underlying etiology of the hallux ulcer was discussed and potential options to try to promote healing and diminish the likelihood for recurrence, both operative and nonoperative.  - One pair custom foot orthoses, semirigid, with arch support and pressure-relief underneath the tip of the right hallux to try to heal and prevent recurrence of right hallux ulcer.  - I discussed the option of surgery to try to diminish the underlying deforming force likely causing his claw hallux. We'd previously discussed the option of an FHL tendon release, but the IP joint doesn't appear to have a fixed plantarflexion contracture and it doesn't appear to be due to the FHL; furthermore, given his activity and functional demands he'd expressed some concerns about the loss of pushoff strength from releasing the FHL. It appears more likely that his long extensor (EHL) may be more of an underlying factor behind the claw hallux, and removing this as a deforming force may be a better option.  Risks, benefits, and alternatives to a right hallux reconstruction including interphalangeal joint fusion and transfer of the long extensor (EHL) to the proximal phalanx (Kolb procedure), were discussed in layman's terms.  No guarantees were expressed or implied as to  outcome.  I discussed that this is an option at this point time or in the future.  I discussed the option for not performing of the surgery if he is not having symptoms, and te risk for recurrence.  I discussed the rationale for performing the surgery even though things are doing well now without pain, due to the risk for recurrent ulceration.  Signs any symptoms that should warrant immediate medical attention were discussed.  I discussed that if ulceration were to recur before the planned surgery it may have to be temporarily paused until the ulcer resolves again.  All questions that this very pleasant gentleman at this time were answered.

## 2025-03-13 LAB
ACID FAST STAIN (ARUP): NORMAL
ACID FAST STAIN (ARUP): NORMAL

## 2025-04-30 ENCOUNTER — OFFICE VISIT (OUTPATIENT)
Dept: FAMILY MEDICINE | Facility: CLINIC | Age: 60
End: 2025-04-30
Payer: COMMERCIAL

## 2025-04-30 VITALS
SYSTOLIC BLOOD PRESSURE: 122 MMHG | TEMPERATURE: 97.9 F | DIASTOLIC BLOOD PRESSURE: 72 MMHG | OXYGEN SATURATION: 97 % | RESPIRATION RATE: 15 BRPM | HEART RATE: 64 BPM

## 2025-04-30 DIAGNOSIS — M54.2 NECK PAIN: ICD-10-CM

## 2025-04-30 DIAGNOSIS — M54.50 ACUTE RIGHT-SIDED LOW BACK PAIN WITHOUT SCIATICA: Primary | ICD-10-CM

## 2025-04-30 NOTE — PATIENT INSTRUCTIONS
ASSESSMENT and PLAN:     Markell is a 58 yo with RIGHT sided low back pain with radiation to right buttocks and lateral thigh. No systemic symptoms.   Also, with some neck pain    PLAN:    Discussed but deferred X-rays today as unlikely to   Referred to PT. Ask for ARIES Maier who you saw previously   Use extra strength Tylenol before bed. Also, take Naprosyn once to twice daily if needed  Let us know if no improvement in 6 weeks, sooner if worse        Bijan Rosario MD  Family Medicine and Sports Medicine  [facility]

## 2025-04-30 NOTE — PROGRESS NOTES
ASSESSMENT and PLAN:     Markell is a 60 yo with RIGHT sided low back pain with radiation to right buttocks and lateral thigh. No systemic symptoms.   Also, with some neck pain    PLAN:    Discussed but deferred X-rays today as unlikely to   Referred to PT. Ask for ARIES Maier who you saw previously   Use extra strength Tylenol before bed. Also, take Naprosyn once to twice daily if needed  Let us know if no improvement in 6 weeks, sooner if worse        Bijan Rosario MD  Family Medicine and Sports Medicine  [facility]      Medical assistant intake:  Markell Garcia is a 59 year old male who presents to [facility] today for Musculoskeletal Problem (Neck pain -- x3 weeks, no known injury. Comes and goes, feeling better today. Worse with sitting, pain will radiate down R arm. Low back -- off and on for a few weeks, picked up grandson and heavy box exacerbated. Going into glute and calf, R sided. Saw Chiro for neck, got some stretches that helped, standing. Low back was keeping him up last night.)      SUBJECTIVE:   This is my first time meeting Markell.  He is a 59-year-old who has a history of various musculoskeletal issues including low back pain with a discectomy about 30 years ago and also bilateral shoulder rotator cuff surgeries.    He is here today due to neck pain that began 3 weeks ago.  It started on his left side but then moved towards his right side and towards the right lateral arm.  That pain has somewhat improved but he then developed acute on chronic right sided low back pain.  This became worse about 5 days ago after he lifted his grandchild and was walking down the stairs.  He then felt an aching towards his right buttocks and the right thigh.  This does not go down to his toes.       states that he feels otherwise well.  Denies any change in bowel or bladder.  No fever or sweats or chills.    Review Of Systems:    See subjective.   Has otherwise been in usual state of  health  Problem list per EMR:  Patient Active Problem List   Diagnosis    Idiopathic peripheral neuropathy    Hypercholesteremia    Hallux malleus of right foot    ED (erectile dysfunction)    Gastroesophageal reflux disease without esophagitis    Pain of left lower leg    Neuropathic ulcer of right foot, limited to breakdown of skin (H)    Adenomatous colon polyp    Right hallux osteomyelitis (H)       Current Outpatient Medications   Medication Sig Dispense Refill    alpha-lipoic acid 100 MG capsule Take 100 mg by mouth every morning.      Ascorbic Acid (VITAMIN C PO) Take 500 mg by mouth every morning.      Cholecalciferol (VITAMIN D3 PO) Take 1,000 Units by mouth daily.      Coenzyme Q10 (COQ10 PO) Take 1 tablet by mouth every morning.      FOLIC ACID PO Take 1 mg by mouth every morning.      Glucosamine HCl (GLUCOSAMINE PO) Take 1 capsule by mouth every morning.      Multiple Vitamins-Minerals (ZINC PO) Take 1 capsule by mouth every morning. Zinc      Omega-3 Fatty Acids (FISH OIL PO) Take 1 capsule by mouth every morning.      omeprazole (PRILOSEC OTC) 20 MG EC tablet Take 20 mg by mouth daily as needed (heartburn).      pregabalin (LYRICA) 75 MG capsule Take 1 capsule (75 mg) by mouth at bedtime. (Patient taking differently: Take 75 mg by mouth 2 times daily.) 90 capsule 3    rosuvastatin (CRESTOR) 10 MG tablet Take 1 tablet (10 mg) by mouth daily. (Patient taking differently: Take 10 mg by mouth every morning.) 90 tablet 3    sildenafil (VIAGRA) 100 MG tablet Take 1 tablet (100 mg) by mouth daily as needed (30-60 minutes before intercourse for ED). 30 tablet 11    aspirin 81 MG EC tablet Take 1 tablet (81 mg) by mouth 2 times daily. 60 tablet 0    doxycycline hyclate (VIBRA-TABS) 100 MG tablet Take 1 tablet (100 mg) by mouth 2 times daily. 28 tablet 0       No Known Allergies     Social:     Recently retired.    OBJECTIVE    Vitals: /72 (BP Location: Left arm, Patient Position: Sitting, Cuff Size:  Adult Large)   Pulse 64   Temp 97.9  F (36.6  C) (Temporal)   Resp 15   SpO2 97%   BMI= There is no height or weight on file to calculate BMI.    He appears well and in no distress.  He has no specific point tenderness around his neck and his neck range of motion is in the normal range looking laterally to either side, with flexion and with extension.  He has full range of motion of both shoulders.  He is weak in his left infraspinatus with strength at about 4-/5.    He is able to rise from a seated position without difficulty and do repeated squats.  Also repeated heel raises.  No weakness noted in the lower extremities.  Area of pain in the low back is at the right L4 to sacroiliac joint region.  He can forward bend and nearly touch his toes.  His reflexes are diminished but symmetric at about 0 to +1 on both sides at the Achilles and the patella.  Strength and sensation are intact distally.    SEE TOP OF NOTE FOR ASSESSMENT AND PLAN    --Bijan Rosario MD  [facility], Department of Family Medicine and Community Health   Speaking Coherently

## 2025-04-30 NOTE — NURSING NOTE
59 year old  Chief Complaint   Patient presents with    Musculoskeletal Problem     Neck pain -- x3 weeks, no known injury. Comes and goes, feeling better today. Worse with sitting, pain will radiate down R arm. Low back -- off and on for a few weeks, picked up grandson and heavy box exacerbated. Going into glute and calf, R sided. Saw Chiro for neck, got some stretches that helped, standing. Low back was keeping him up last night.       Blood pressure 122/72, pulse 64, temperature 97.9  F (36.6  C), temperature source Temporal, resp. rate 15, SpO2 97%. There is no height or weight on file to calculate BMI.  Patient Active Problem List   Diagnosis    Idiopathic peripheral neuropathy    Hypercholesteremia    Hallux malleus of right foot    ED (erectile dysfunction)    Gastroesophageal reflux disease without esophagitis    Pain of left lower leg    Neuropathic ulcer of right foot, limited to breakdown of skin (H)    Adenomatous colon polyp    Right hallux osteomyelitis (H)       Wt Readings from Last 2 Encounters:   01/15/25 105 kg (231 lb 7.7 oz)   01/06/25 105.2 kg (232 lb)     BP Readings from Last 3 Encounters:   04/30/25 122/72   01/15/25 120/72   12/13/24 112/71         Current Outpatient Medications   Medication Sig Dispense Refill    alpha-lipoic acid 100 MG capsule Take 100 mg by mouth every morning.      Ascorbic Acid (VITAMIN C PO) Take 500 mg by mouth every morning.      Cholecalciferol (VITAMIN D3 PO) Take 1,000 Units by mouth daily.      Coenzyme Q10 (COQ10 PO) Take 1 tablet by mouth every morning.      FOLIC ACID PO Take 1 mg by mouth every morning.      Glucosamine HCl (GLUCOSAMINE PO) Take 1 capsule by mouth every morning.      Multiple Vitamins-Minerals (ZINC PO) Take 1 capsule by mouth every morning. Zinc      Omega-3 Fatty Acids (FISH OIL PO) Take 1 capsule by mouth every morning.      omeprazole (PRILOSEC OTC) 20 MG EC tablet Take 20 mg by mouth daily as needed (heartburn).      pregabalin  "(LYRICA) 75 MG capsule Take 1 capsule (75 mg) by mouth at bedtime. (Patient taking differently: Take 75 mg by mouth 2 times daily.) 90 capsule 3    rosuvastatin (CRESTOR) 10 MG tablet Take 1 tablet (10 mg) by mouth daily. (Patient taking differently: Take 10 mg by mouth every morning.) 90 tablet 3    sildenafil (VIAGRA) 100 MG tablet Take 1 tablet (100 mg) by mouth daily as needed (30-60 minutes before intercourse for ED). 30 tablet 11    aspirin 81 MG EC tablet Take 1 tablet (81 mg) by mouth 2 times daily. 60 tablet 0    doxycycline hyclate (VIBRA-TABS) 100 MG tablet Take 1 tablet (100 mg) by mouth 2 times daily. 28 tablet 0     No current facility-administered medications for this visit.       Social History     Tobacco Use    Smoking status: Never    Smokeless tobacco: Never   Vaping Use    Vaping status: Never Used   Substance Use Topics    Alcohol use: Yes     Comment: Once a week, 2-3 beers at a time    Drug use: Never       Health Maintenance Due   Topic Date Due    Pneumococcal Vaccine: 50+ Years (1 of 1 - PCV) Never done       No results found for: \"PAP\"      April 30, 2025 9:40 AM    "

## 2025-05-04 ENCOUNTER — OFFICE VISIT (OUTPATIENT)
Dept: URGENT CARE | Facility: URGENT CARE | Age: 60
End: 2025-05-04
Payer: COMMERCIAL

## 2025-05-04 VITALS
HEIGHT: 77 IN | TEMPERATURE: 97.8 F | RESPIRATION RATE: 12 BRPM | HEART RATE: 78 BPM | OXYGEN SATURATION: 96 % | SYSTOLIC BLOOD PRESSURE: 148 MMHG | DIASTOLIC BLOOD PRESSURE: 87 MMHG | WEIGHT: 235 LBS | BODY MASS INDEX: 27.75 KG/M2

## 2025-05-04 DIAGNOSIS — Z98.890 HX OF LUMBAR DISCECTOMY: ICD-10-CM

## 2025-05-04 DIAGNOSIS — M54.41 ACUTE RIGHT-SIDED LOW BACK PAIN WITH RIGHT-SIDED SCIATICA: Primary | ICD-10-CM

## 2025-05-04 DIAGNOSIS — G60.9 IDIOPATHIC PERIPHERAL NEUROPATHY: ICD-10-CM

## 2025-05-04 PROCEDURE — 96372 THER/PROPH/DIAG INJ SC/IM: CPT | Performed by: NURSE PRACTITIONER

## 2025-05-04 PROCEDURE — 99214 OFFICE O/P EST MOD 30 MIN: CPT | Mod: 25 | Performed by: NURSE PRACTITIONER

## 2025-05-04 PROCEDURE — 3077F SYST BP >= 140 MM HG: CPT | Performed by: NURSE PRACTITIONER

## 2025-05-04 PROCEDURE — 3079F DIAST BP 80-89 MM HG: CPT | Performed by: NURSE PRACTITIONER

## 2025-05-04 PROCEDURE — 1125F AMNT PAIN NOTED PAIN PRSNT: CPT | Performed by: NURSE PRACTITIONER

## 2025-05-04 RX ORDER — METHOCARBAMOL 750 MG/1
750 TABLET, FILM COATED ORAL 3 TIMES DAILY PRN
Qty: 21 TABLET | Refills: 0 | Status: SHIPPED | OUTPATIENT
Start: 2025-05-04 | End: 2025-05-11

## 2025-05-04 RX ORDER — PREDNISONE 20 MG/1
TABLET ORAL
Qty: 7 TABLET | Refills: 0 | Status: SHIPPED | OUTPATIENT
Start: 2025-05-04

## 2025-05-04 RX ORDER — LIDOCAINE 50 MG/G
1 PATCH TOPICAL EVERY 24 HOURS
Qty: 10 PATCH | Refills: 0 | Status: SHIPPED | OUTPATIENT
Start: 2025-05-04 | End: 2025-05-14

## 2025-05-04 RX ORDER — KETOROLAC TROMETHAMINE 30 MG/ML
30 INJECTION, SOLUTION INTRAMUSCULAR; INTRAVENOUS ONCE
Status: COMPLETED | OUTPATIENT
Start: 2025-05-04 | End: 2025-05-04

## 2025-05-04 RX ADMIN — KETOROLAC TROMETHAMINE 30 MG: 30 INJECTION, SOLUTION INTRAMUSCULAR; INTRAVENOUS at 11:13

## 2025-05-04 ASSESSMENT — PAIN SCALES - GENERAL: PAINLEVEL_OUTOF10: MODERATE PAIN (6)

## 2025-05-04 NOTE — PATIENT INSTRUCTIONS
Acute on chronic flareup of right sided low back pain with sciatica  Rest ice heat massage stretch  Rotate Tylenol ibuprofen every 4-6 hours as needed  Toradol given in clinic  Robaxin muscle relaxer use caution with sedation and gait impairment driving  Prednisone anti-inflammatory steroid  Lidoderm pain patches  Follow-up with physical therapy PCP Ortho and pain referral placed per request  ER if severe worsening pain inability to walk bowel bladder change incontinence retention numbness tingling in groin sweats

## 2025-05-04 NOTE — PROGRESS NOTES
Urgent Care Clinic Visit    Chief Complaint   Patient presents with    Urgent Care    Back Pain     Patient presents with lower back pain that runs down the back of his right leg into his calf that started 3x days ago. Pt denies any injury or trauma.              5/4/2025    10:31 AM   Additional Questions   Roomed by Ana

## 2025-05-04 NOTE — PROGRESS NOTES
Chief Complaint   Patient presents with    Urgent Care    Back Pain     Patient presents with lower back pain that runs down the back of his right leg into his calf that started 3x days ago. Pt denies any injury or trauma.     SUBJECTIVE:  Markell Garcia is a 59 year old male who presents today with right sided low back pain and sciatica over the past week.  Flared up after lifting his grandson.  Saw primary care a couple days ago and it was not quite as severe, Tylenol naproxen advised.  He is getting in with physical therapy and has seen chiropractic.  Wondering about a pain management referral.  Relevant past medical history of L5 discectomy 30 years ago.  He has needed opioids in the past for his back pain.  Describes having right sided lumbosacral SI tenderness pain radiating into the buttocks down to the calf.  Describes it as burning aching seizes up some weakness difficult to perform activities of daily living and get in any comfortable position.  No bowel bladder incontinence retention saddle anesthesia inability to walk midline spinal tenderness.  Baseline neuropathy on pregabalin.    Past Medical History:   Diagnosis Date    Constipation     ED (erectile dysfunction)     Gastroesophageal reflux disease without esophagitis     Hammertoe of right foot     Hypercholesteremia     Neuropathy      Current Outpatient Medications   Medication Sig Dispense Refill    alpha-lipoic acid 100 MG capsule Take 100 mg by mouth every morning.      Ascorbic Acid (VITAMIN C PO) Take 500 mg by mouth every morning.      Cholecalciferol (VITAMIN D3 PO) Take 1,000 Units by mouth daily.      Coenzyme Q10 (COQ10 PO) Take 1 tablet by mouth every morning.      FOLIC ACID PO Take 1 mg by mouth every morning.      Glucosamine HCl (GLUCOSAMINE PO) Take 1 capsule by mouth every morning.      lidocaine (LIDODERM) 5 % patch Place 1 patch over 12 hours onto the skin every 24 hours for 10 days. To prevent lidocaine toxicity, patient  "should be patch free for 12 hrs daily. 10 patch 0    methocarbamol (ROBAXIN) 750 MG tablet Take 1 tablet (750 mg) by mouth 3 times daily as needed for muscle spasms. 21 tablet 0    Multiple Vitamins-Minerals (ZINC PO) Take 1 capsule by mouth every morning. Zinc      Omega-3 Fatty Acids (FISH OIL PO) Take 1 capsule by mouth every morning.      omeprazole (PRILOSEC OTC) 20 MG EC tablet Take 20 mg by mouth daily as needed (heartburn).      predniSONE (DELTASONE) 20 MG tablet 2 tabs for 2 days, 1 tab for 2 days, 0.5 tab for 2 days 7 tablet 0    pregabalin (LYRICA) 75 MG capsule Take 1 capsule (75 mg) by mouth at bedtime. 90 capsule 3    rosuvastatin (CRESTOR) 10 MG tablet Take 1 tablet (10 mg) by mouth daily. 90 tablet 3    sildenafil (VIAGRA) 100 MG tablet Take 1 tablet (100 mg) by mouth daily as needed (30-60 minutes before intercourse for ED). 30 tablet 11    aspirin 81 MG EC tablet Take 1 tablet (81 mg) by mouth 2 times daily. 60 tablet 0    doxycycline hyclate (VIBRA-TABS) 100 MG tablet Take 1 tablet (100 mg) by mouth 2 times daily. 28 tablet 0     Social History     Tobacco Use    Smoking status: Never    Smokeless tobacco: Never   Substance Use Topics    Alcohol use: Yes     Comment: Once a week, 2-3 beers at a time     No Known Allergies    Review of Systems   ROS: 10 point ROS neg other than the symptoms noted above in the HPI.    OBJECTIVE:  BP (!) 148/87 (BP Location: Right arm)   Pulse 78   Temp 97.8  F (36.6  C) (Temporal)   Resp 12   Ht 1.956 m (6' 5\")   Wt 106.6 kg (235 lb)   SpO2 96%   BMI 27.87 kg/m      Physical Exam  Vitals reviewed.   Constitutional:       General: He is not in acute distress.     Appearance: Normal appearance. He is not ill-appearing, toxic-appearing or diaphoretic.   HENT:      Head: Normocephalic and atraumatic.   Cardiovascular:      Rate and Rhythm: Normal rate.      Pulses: Normal pulses.   Pulmonary:      Effort: Pulmonary effort is normal.   Musculoskeletal:         " General: Tenderness present. No swelling. Normal range of motion.      Cervical back: Normal range of motion and neck supple.      Right lower leg: No edema.      Left lower leg: No edema.   Skin:     General: Skin is warm and dry.      Findings: No erythema or rash.   Neurological:      General: No focal deficit present.      Mental Status: He is alert and oriented to person, place, and time.      Sensory: No sensory deficit.      Gait: Gait abnormal.   Psychiatric:         Mood and Affect: Mood normal.         Behavior: Behavior normal.       ASSESSMENT:    ICD-10-CM    1. Acute right-sided low back pain with right-sided sciatica  M54.41 ketorolac (TORADOL) injection 30 mg     methocarbamol (ROBAXIN) 750 MG tablet     predniSONE (DELTASONE) 20 MG tablet     Pain Management  Referral     lidocaine (LIDODERM) 5 % patch      2. Idiopathic peripheral neuropathy  G60.9       3. Hx of lumbar discectomy  Z98.890         PLAN:     Acute on chronic flareup of right sided low back pain with sciatica  Rest ice heat massage stretch  Rotate Tylenol ibuprofen every 4-6 hours as needed  Toradol given in clinic  Robaxin muscle relaxer use caution with sedation and gait impairment driving  Prednisone anti-inflammatory steroid  Lidoderm pain patches  Follow-up with physical therapy PCP Ortho and pain referral placed per request  ER if severe worsening pain inability to walk bowel bladder change incontinence retention numbness tingling in groin sweats    Follow up with primary care provider with any problems, questions or concerns or if symptoms worsen or fail to improve. Patient agreed to plan and verbalized understanding.    MATT Brown-BC  [facility]

## 2025-05-05 DIAGNOSIS — M54.50 ACUTE RIGHT-SIDED LOW BACK PAIN WITHOUT SCIATICA: Primary | ICD-10-CM

## 2025-05-05 DIAGNOSIS — M54.50 ACUTE RIGHT-SIDED LOW BACK PAIN WITHOUT SCIATICA: ICD-10-CM

## 2025-05-05 RX ORDER — ACETAMINOPHEN AND CODEINE PHOSPHATE 300; 30 MG/1; MG/1
1 TABLET ORAL EVERY 8 HOURS PRN
Qty: 8 TABLET | Refills: 0 | Status: SHIPPED | OUTPATIENT
Start: 2025-05-05

## 2025-05-05 RX ORDER — ACETAMINOPHEN AND CODEINE PHOSPHATE 300; 30 MG/1; MG/1
1 TABLET ORAL EVERY 8 HOURS PRN
Qty: 8 TABLET | Refills: 0 | Status: SHIPPED | OUTPATIENT
Start: 2025-05-05 | End: 2025-05-05

## 2025-05-05 NOTE — TELEPHONE ENCOUNTER
Medication questions (route to triage team)    Who is calling - [facility]   Full medication name and dosage -     acetaminophen-codeine (TYLENOL #3) 300-30 MG per tablet     Question/clarification needed - PATIENT SAID THE PHARMACY IS OUT OF THIS MEDICATION, NEEDS IT SENT TO A DIFFERENT PHARMACY  Name and location of pharmacy     [facility] [id number] [facility] [employer] - [place], [place] - [address]  [address] [address]  Phone: [phone] Fax: [phone]    Ok to leave a message on ? Yes     toileting/walking/standing Yes

## 2025-05-07 ENCOUNTER — THERAPY VISIT (OUTPATIENT)
Age: 60
End: 2025-05-07
Payer: COMMERCIAL

## 2025-05-07 DIAGNOSIS — M54.16 LUMBAR RADICULOPATHY: Primary | ICD-10-CM

## 2025-05-07 DIAGNOSIS — M54.2 NECK PAIN: ICD-10-CM

## 2025-05-07 DIAGNOSIS — M54.50 ACUTE RIGHT-SIDED LOW BACK PAIN WITHOUT SCIATICA: ICD-10-CM

## 2025-05-07 PROCEDURE — 97161 PT EVAL LOW COMPLEX 20 MIN: CPT | Mod: GP

## 2025-05-07 PROCEDURE — 97110 THERAPEUTIC EXERCISES: CPT | Mod: GP

## 2025-05-07 NOTE — PROGRESS NOTES
"PHYSICAL THERAPY EVALUATION  Type of Visit: Evaluation       Fall Risk Screen:  Have you fallen 2 or more times in the past year?: No  Have you fallen and had an injury in the past year?: No    Subjective       Pt presents to PT with primary complaint of low back with radiating leg pain. Has a history of low back pain on & off, was managing well, but then had an acute flare of pain after bending forwards to  & carry his grandchild. This was about 2 weeks ago. Pain was ok but then became very severe and it was painful/difficult to find a comfortable position. Low back pain, radiating down to the left leg \"feels like my calf was on fire.\" Went to UC last week prescribed steroid dosepak & muscle relaxers which helped a bit. Also has tyelnol-codeine from PCP which has helped.     Pain is worse in the leg than the low back. Stiff/sore in the glute, but the most painful \"burning\" in R lateral calf. Also noticed new weakness in the right ankle - difficult to pick heel up off the ground. Also noticed some imbalance walking on uneven grass recently. Has pain management appt set up for next week.         Presenting condition or subjective complaint: Neck and low back pain  Date of onset:      Relevant medical history: Arthritis; Neck injury   Dates & types of surgery: Discectomy 1997. Rotator cuff both shoulders 2010 and 2015. Toe surgery 2025 (I&D for right great toe infection).     Prior diagnostic imaging/testing results:       Lumbar MRI 2/26/24  L4-L5: Moderate disc height loss. Central disc extrusion extending inferiorly along the medial margins of the bilateral traversing L5 nerve roots within the lateral recesses. Mild to moderate bilateral facet arthropathy. Moderate to severe right foraminal stenosis. Moderate left foraminal stenosis. Mild to moderate spinal canal stenosis related to the extrusion.  L5-S1: Prior right hemilaminectomy. Severe disc height loss. Disc bulge with annular fissuring and right " central extrusion component along the medial margin of the traversing right S1 nerve root within the lateral recess (series 5 image 7, 8). Mild bilateral facet arthropathy. Moderate bilateral foraminal stenosis. No spinal canal stenosis. Asymmetric effacement of the right lateral recess fat which could represent postoperative scar tissue.    Prior therapy history for the same diagnosis, illness or injury: Yes   -2024  3/13/24 - Joe - Left L4-L5 and L5-S1 TFESI - 80%+ relief for 1-2 days, then 15% relief   24 - Dr. Lowry - Left knee large joint injection    Prior Level of Function  Transfers: Independent  Ambulation: Independent  ADL: Independent  IADL: Childcare, Driving, Finances, Housekeeping, Medication management, Yard work    Living Environment  Social support: With a significant other or spouse   Type of home: House   Stairs to enter the home: Yes 4 Is there a railing: Yes     Ramp: No   Stairs inside the home: Yes 10 Is there a railing: Yes     Help at home:    Equipment owned:       Employment: No    Hobbies/Interests: [employer]. Biking. Fishing.    Patient goals for therapy: Yard work    Pain assessment: Pain present  Location: Right calf, glute /Ratin/10     Objective   LUMBAR SPINE EVALUATION  PAIN: Pain Level at Rest: 2/10  Pain Level with Use: 8/10  Pain Location: Right glute down to Right calf, sometimes low back   Pain Quality: Aching in glute, and Burning in lower leg  Pain Frequency: intermittent  Pain is Worst: varies, was very painful at night before but now better   Pain is Exacerbated By: bending forward (such as putting socks on); sitting too long   Pain is Relieved By: prescription pain medications and steroid medications; standing; ice & heat  Pain Progression: Improved    GAIT:   Weightbearing Status: FWB  Assistive Device(s): None  Gait Deviations: antalgic/slight decreased stance time on Right    BALANCE/PROPRIOCEPTION: Single Leg Stance Eyes Open (seconds): 15 sec left,  "~3 sec right significantly increased proprio challenge    WEIGHTBEARING ALIGNMENT: Knee WB Alignment:Genu varus L, Genu varus R    ROM:   (Degrees) Left AROM Left PROM  Right AROM Right PROM   Hip Flexion       Hip Extension       Hip Abduction       Hip Adduction       Hip Internal Rotation       Hip External Rotation       Knee Flexion       Knee Extension       Lumbar Side glide deferred deferred   Lumbar Flexion Min loss, hands to distal shin ++L calf burning    Lumbar Extension Mod loss quite hypomobile, reports no symptoms in LLE \"feels a bit better\"   Pain:   End feel:     MYOTOMES:    Left Right   T12-L3 (Hip Flexion) 5 5   L2-4 (Quads)  5 5   L4 (Ankle DF) 5 4+   L5 (Great Toe Ext) 4+ 4+   S1 (Toe Raise) At least 4/5  6 reps full range Unable to complete SL heel raise     DTR S:    Left Right   L4 (Quad) 2 2   S1 (Achilles) 2 2     NEURAL TENSION: +slump right for leg pain, better with ankle PF    FLEXIBILITY: Decreased hamstrings L, Decreased hamstrings R    KEY PT FINDINGS:  1) LLE pain worse with standing flexion, better with prone press-ups - apparent extension directional preference  2) Left leg ankle plantarflexion weakness consistent with S1 nerve impairment  3) Moderate symptom irritability    Assessment & Plan   CLINICAL IMPRESSIONS  Medical Diagnosis:      Treatment Diagnosis:     Impression/Assessment: Patient is a 59 year old male with low back & radicular leg pain complaints.  The following significant findings have been identified: Pain, Decreased ROM/flexibility, Decreased strength, Decreased proprioception, Inflammation, Impaired gait, Impaired muscle performance, and Decreased activity tolerance. These impairments interfere with their ability to perform self care tasks, recreational activities, household chores, driving , household mobility, and community mobility as compared to previous level of function.     Clinical Decision Making (Complexity):  Clinical Presentation: " Stable/Uncomplicated  Clinical Presentation Rationale: based on medical and personal factors listed in PT evaluation  Clinical Decision Making (Complexity): Low complexity    PLAN OF CARE  Treatment Interventions:  Modalities: Cryotherapy, Dry Needling, Mechanical Traction  Interventions: Manual Therapy, Neuromuscular Re-education, Therapeutic Activity, Therapeutic Exercise, Self-Care/Home Management    Long Term Goals            Frequency of Treatment:    Duration of Treatment:      Recommended Referrals to Other Professionals: NA  Education Assessment:        Risks and benefits of evaluation/treatment have been explained.   Patient/Family/caregiver agrees with Plan of Care.     Evaluation Time:        Evaluation Only     Signing Clinician: Hollie Maier, PT

## 2025-05-13 ENCOUNTER — OFFICE VISIT (OUTPATIENT)
Dept: PHYSICAL MEDICINE AND REHAB | Facility: CLINIC | Age: 60
End: 2025-05-13
Attending: FAMILY MEDICINE
Payer: COMMERCIAL

## 2025-05-13 ENCOUNTER — THERAPY VISIT (OUTPATIENT)
Age: 60
End: 2025-05-13
Payer: COMMERCIAL

## 2025-05-13 VITALS
DIASTOLIC BLOOD PRESSURE: 74 MMHG | HEIGHT: 77 IN | HEART RATE: 69 BPM | WEIGHT: 235 LBS | BODY MASS INDEX: 27.75 KG/M2 | OXYGEN SATURATION: 96 % | SYSTOLIC BLOOD PRESSURE: 130 MMHG

## 2025-05-13 DIAGNOSIS — M54.50 ACUTE RIGHT-SIDED LOW BACK PAIN WITHOUT SCIATICA: ICD-10-CM

## 2025-05-13 DIAGNOSIS — M96.1 LUMBAR POST-LAMINECTOMY SYNDROME: ICD-10-CM

## 2025-05-13 DIAGNOSIS — M54.16 LUMBAR RADICULOPATHY: Primary | ICD-10-CM

## 2025-05-13 DIAGNOSIS — M54.50 ACUTE RIGHT-SIDED LOW BACK PAIN WITHOUT SCIATICA: Primary | ICD-10-CM

## 2025-05-13 DIAGNOSIS — M51.362 DEGENERATION OF INTERVERTEBRAL DISC OF LUMBAR REGION WITH DISCOGENIC BACK PAIN AND LOWER EXTREMITY PAIN: ICD-10-CM

## 2025-05-13 DIAGNOSIS — M54.16 LUMBAR RADICULOPATHY: ICD-10-CM

## 2025-05-13 PROCEDURE — 97110 THERAPEUTIC EXERCISES: CPT | Mod: GP

## 2025-05-13 PROCEDURE — 97140 MANUAL THERAPY 1/> REGIONS: CPT | Mod: GP

## 2025-05-13 ASSESSMENT — PAIN SCALES - GENERAL: PAINLEVEL_OUTOF10: MODERATE PAIN (5)

## 2025-05-13 NOTE — LETTER
5/13/2025      Markell Garcia  [address] [id number]      Dear Colleague,    Thank you for referring your patient, Markell Garcia, to the [facility]. Please see a copy of my visit note below.    ASSESSMENT:  Markell Garcia is a 59 year old male presents for follow-up with new RIGHT sided low back pain with radicular symptoms.     Given his acute weakness in plantar flexion and eversion this is concordant with previous 2024 MRI findings RIGHT L5 and S1 nerve root impingement. However given the change in his neurological status and the acuity of his symptoms, we will repeat Lumbar MRI with and without contrast to further evaluate. We will also refer him for surgical evaluation given the acute weakness. We discussed potential to do injections to help mitigate his pain as he is being evaluated by neurosurgery, but will wait until his MRI is completed to get a more up to date picture of his spine. He is to continue physical therapy for which he has already started.        Diagnoses and all orders for this visit:  Lumbar radiculopathy  -     MR Lumbar Spine w/o & w Contrast; Future  -     Adult Neurosurgery  Referral; Future  Acute right-sided low back pain without sciatica  -     Spine  Referral  Degeneration of intervertebral disc of lumbar region with discogenic back pain and lower extremity pain  -     MR Lumbar Spine w/o & w Contrast; Future  -     Adult Neurosurgery  Referral; Future  Lumbar post-laminectomy syndrome  -     MR Lumbar Spine w/o & w Contrast; Future  -     Adult Neurosurgery  Referral; Future       Patient is neurologically intact on exam. No myelopathic or red flag symptoms.    PLAN:  Reviewed spine anatomy and disease process. Discussed diagnosis and treatment options with the patient today. A shared decision making model was used. The patient's values and choices were respected. The following represents what  was discussed and decided upon by the provider and the patient.    1. DIAGNOSTIC TESTS  MRI of lumbar spine was ordered for further characterization of soft tissues and/or neural compression    2. PHYSICAL THERAPY  Patient is already in PT or has a pending referral to begin soon.  Discussed the importance of core strengthening, ROM, stretching exercises with the patient and how each of these entities is important in decreasing pain.  Explained to the patient that the purpose of physical therapy is to teach the patient a home exercise program.  These exercises need to be performed every day in order to decrease pain and prevent future occurrences of pain.  Likened it to brushing one's teeth.      3. MEDICATIONS:  Discussed multiple medication options today with patient. Discussed risks, side effects, and proper use of medications. Patient verbalized understanding.  No new medications ordered at this visit.    4. INTERVENTIONS:  Patient requires further imaging to assess what interventional options may be best for them.  Pending imaging results, would consider right L5 or S1 JUS    5. OTHER REFERRALS:  A referral was placed to Neurosurgery for surgical evaluation of patient's spine as their has been acute neurological changes with radicular symptoms and weakness. His pain has also been refractory to medications.     6. FOLLOW-UP  To review imaging results once imaging is completed  Patient advised to contact our office for earlier appointment if symptoms worsening or not improving, or any side effects are noticed.    Advised patient to call the [facility] if symptoms worsen or you have problems controlling bladder and bowel function.   ______________________________________________________________________    SUBJECTIVE:   Markell Garcia  is a 59 year old male who presents today for evaluation of acute onset right low back pain. He first noticed right hip pain over his trochanter around 3 months ago. It  "progressively migrated to the right side of his low back. Roughly 3 weeks ago he picked up his grandchild and walked down stairs. When he went to put him down, he noticed a shooting pain from his back down to his proximal hamstring. Radicular symptoms progressed down to his calf. He went to urgent care where he received prednisone, lidocaine patch, methocarbamol, and tordol shot. This was minimally effective. The pain is severe at night keeping him up. He is taking Tylenol 3 currently at night to help sleep. He also noticed in the last 2 weeks weakness in standing on his toes and has rolled his ankle a couple of times as he \"can't keep his foot out\".     Severity: 9/10   Laterality: RIGHT  Radiation: From right low back down to mid calf  Worse with: Bending/picking things up  Better with: Standing and being upright as possible  Numbness: Denies  Weakness: Standing on toes and everting foot.     Prior Surgeries: Right L5-S1 microdiscectomy 20-30 years ago.     -Treatment to Date: Medications, Physical therapy (current)    3/27/24 - Diaz - Left L4-5 and L5-S1 TFESI - 100% relief for 1-2 days then 10-15% relief      Oswestry (ALEE) Questionnaire        5/9/2025     4:08 PM   OSWESTRY DISABILITY INDEX   Count 10    Sum 23    Oswestry Score (%) 46 %        Patient-reported       Neck Disability Index:      5/9/2025     4:10 PM   Neck Disability Index (  Darian H. and Eugenie HASKINS. 1991. All rights reserved.; used with permission)   SECTION 1 - PAIN INTENSITY 0   SECTION 2 - PERSONAL CARE 0   SECTION 3 - LIFTING 0   SECTION 4 - READING 0   SECTION 5 - HEADACHES 1   SECTION 6 - CONCENTRATION 0   SECTION 7 - WORK 0   SECTION 8 - DRIVING 0   SECTION 9 - SLEEPING 0   SECTION 10 - RECREATION 0   Count 10    Sum 1    Raw Score: /50 1    Neck Disability Index Score: (%) 2 %        Patient-reported              -Medications:    Current Outpatient Medications   Medication Sig Dispense Refill     acetaminophen-codeine (TYLENOL #3) " 300-30 MG per tablet Take 1 tablet by mouth every 8 hours as needed for severe pain. 8 tablet 0     alpha-lipoic acid 100 MG capsule Take 100 mg by mouth every morning.       Ascorbic Acid (VITAMIN C PO) Take 500 mg by mouth every morning.       Cholecalciferol (VITAMIN D3 PO) Take 1,000 Units by mouth daily.       Coenzyme Q10 (COQ10 PO) Take 1 tablet by mouth every morning.       FOLIC ACID PO Take 1 mg by mouth every morning.       Glucosamine HCl (GLUCOSAMINE PO) Take 1 capsule by mouth every morning.       lidocaine (LIDODERM) 5 % patch Place 1 patch over 12 hours onto the skin every 24 hours for 10 days. To prevent lidocaine toxicity, patient should be patch free for 12 hrs daily. 10 patch 0     Multiple Vitamins-Minerals (ZINC PO) Take 1 capsule by mouth every morning. Zinc       Omega-3 Fatty Acids (FISH OIL PO) Take 1 capsule by mouth every morning.       pregabalin (LYRICA) 75 MG capsule Take 1 capsule (75 mg) by mouth at bedtime. 90 capsule 3     rosuvastatin (CRESTOR) 10 MG tablet Take 1 tablet (10 mg) by mouth daily. 90 tablet 3     sildenafil (VIAGRA) 100 MG tablet Take 1 tablet (100 mg) by mouth daily as needed (30-60 minutes before intercourse for ED). 30 tablet 11     aspirin 81 MG EC tablet Take 1 tablet (81 mg) by mouth 2 times daily. 60 tablet 0     doxycycline hyclate (VIBRA-TABS) 100 MG tablet Take 1 tablet (100 mg) by mouth 2 times daily. 28 tablet 0     omeprazole (PRILOSEC OTC) 20 MG EC tablet Take 20 mg by mouth daily as needed (heartburn). (Patient not taking: Reported on 5/13/2025)       predniSONE (DELTASONE) 20 MG tablet 2 tabs for 2 days, 1 tab for 2 days, 0.5 tab for 2 days 7 tablet 0     No current facility-administered medications for this visit.       No Known Allergies    Past Medical History:   Diagnosis Date     Constipation      ED (erectile dysfunction)      Gastroesophageal reflux disease without esophagitis      Hammertoe of right foot      Hypercholesteremia      Neuropathy          Patient Active Problem List   Diagnosis     Idiopathic peripheral neuropathy     Hypercholesteremia     Hallux malleus of right foot     ED (erectile dysfunction)     Gastroesophageal reflux disease without esophagitis     Pain of left lower leg     Neuropathic ulcer of right foot, limited to breakdown of skin (H)     Adenomatous colon polyp     Right hallux osteomyelitis (H)     Acute right-sided low back pain without sciatica     Lumbar radiculopathy       Past Surgical History:   Procedure Laterality Date     COLONOSCOPY N/A 11/22/2024    Procedure: Colonoscopy with polypectomy;  Surgeon: Karon Guevara MD;  Location: [facility] OR     DISCECTOMY LUMBAR MINIMALLY INVASIVE ONE LEVEL  2000     IRRIGATION AND DEBRIDEMENT TOE, COMBINED Right 1/15/2025    Procedure: Debridement and Irrigation of Right Hallux;  Surgeon: Camilo Ceja MD;  Location: [facility] OR     ROTATOR CUFF REPAIR RT/LT Left      ROTATOR CUFF REPAIR RT/LT Right        Family History   Problem Relation Age of Onset     Hammer toes Mother      Seizure Disorder Mother      Hyperlipidemia Father      Insulin Resistance Father      Parkinsonism Father      Hyperlipidemia Paternal Grandfather      Diabetes Type 2  Paternal Uncle      Peripheral Neuropathy Paternal Uncle      Coronary Artery Disease No family hx of      Cerebrovascular Disease No family hx of      Prostate Cancer No family hx of      Colon Cancer No family hx of        Reviewed past medical, surgical, and family history with patient found on new patient intake packet located in EMR Media tab.     SOCIAL HX: Reviewed    ROS: Specifically negative for bowel/bladder dysfunction, balance changes, headache, dizziness, foot drop, fevers, chills, appetite changes, nausea/vomiting, unexplained weight loss. Otherwise 13 systems reviewed are negative. Please see the patient's intake questionnaire from today for details.    OBJECTIVE:  /74 (BP Location: Left arm, Patient Position: Sitting,  "Cuff Size: Adult Large)   Pulse 69   Ht 6' 5\" (1.956 m)   Wt 235 lb (106.6 kg)   SpO2 96%   BMI 27.87 kg/m      PHYSICAL EXAMINATION:  --CONSTITUTIONAL: Vital signs as above. No acute distress. The patient is well nourished and well groomed.  --PSYCHIATRIC: The patient is awake, alert, oriented to person, place, time and answering questions appropriately with clear speech. Appropriate mood and affect   --RESPIRATORY: Normal rhythm and effort. No abnormal accessory muscle breathing patterns noted.   --GROSS MOTOR: Easily arises from a seated position.  --LUMBAR SPINE: Inspection reveals no evidence of deformity. Range of motion is not limited in extension, lateral rotation, Limited in flexion (due to pain). Mild tenderness to palpation of lumbar paraspinals, no tenderness in spinous processes.  Positive SLR and seated slump on right  --HIPS: Full range of motion bilaterally. Negative ROBERT test.  --LOWER EXTREMITY MOTOR TESTING:  Hip flexion left 5/5, right 5/5  Knee extension left 5/5, right 5/5  Ankle dorsiflexors left 5/5, right 5/5  Ankle plantarflexors left 5/5, right 3+/5   Ankle Eversion left 5/5, right 4/5  Great toe extension left 5/5, right 5/5   Knee flexion left 5/5, right 5/5  --NEUROLOGIC: Sensation to lower extremities is intact bilaterally in L2-S1 dermatomes.  Negative Mancini's bilaterally. Reflexes intact in BLE, no clonus.  --VASCULAR: Warm upper limbs bilaterally. Capillary refill in the upper extremities is less than 1 second.    RESULTS: Available medical records from [facility] and any other outside records were reviewed today.     Imaging:  Available relevant imaging was personally reviewed and interpreted today. The images were shown to the patient and the findings were explained using a spine model.    MRI Lumbar Spine w/o Contrast 02/26/2024:  FINDINGS:   Alignment is significant for multilevel subtle grade I spondylolisthesis and mild levoconvex curvature of the lumbar spine. " Bone marrow demonstrates scattered mixed degenerative endplate changes including mild edema (Modic I) from the L4-L5 disc joint   and mild fatty marrow change (Modic II) surrounding the L5-S1 disc joint. Conus medullaris is unremarkable terminating at the level of the mid L1 vertebral body. Cauda equina is unremarkable. Bilateral sacroiliac joint degenerative change. No convincing   extraspinal abnormality.     L1-L2: Disc height maintained. No herniation. Mild bilateral facet arthropathy. No foraminal or spinal canal stenosis.     L2-L3: Disc height maintained. Minimal bulge with shallow foraminal components. Mild bilateral facet arthropathy. No right foraminal stenosis. Mild, if any, left foraminal stenosis. Mild, if any, spinal canal stenosis.      L3-L4: Mild disc height loss. Disc bulge with bilateral foraminal components. Mild to moderate bilateral facet arthropathy. Mild to moderate right foraminal stenosis. Mild, if any, left foraminal stenosis. Mild, if any, spinal canal stenosis.     L4-L5: Moderate disc height loss. Central disc extrusion extending inferiorly along the medial margins of the bilateral traversing L5 nerve roots within the lateral recesses. Mild to moderate bilateral facet arthropathy. Moderate to severe right   foraminal stenosis. Moderate left foraminal stenosis. Mild to moderate spinal canal stenosis related to the extrusion.     L5-S1: Prior right hemilaminectomy. Severe disc height loss. Disc bulge with annular fissuring and right central extrusion component along the medial margin of the traversing right S1 nerve root within the lateral recess (series 5 image 7, 8). Mild   bilateral facet arthropathy. Moderate bilateral foraminal stenosis. No spinal canal stenosis. Asymmetric effacement of the right lateral recess fat which could represent postoperative scar tissue (series 7 image 55).        Again, thank you for allowing me to participate in the care of your patient.         Sincerely,        Anthony Diaz MD    Electronically signed

## 2025-05-13 NOTE — PROGRESS NOTES
ASSESSMENT:  Markell Garcia is a 59 year old male presents for follow-up with new RIGHT sided low back pain with radicular symptoms.     Given his acute weakness in plantar flexion and eversion this is concordant with previous 2024 MRI findings RIGHT L5 and S1 nerve root impingement. However given the change in his neurological status and the acuity of his symptoms, we will repeat Lumbar MRI with and without contrast to further evaluate. We will also refer him for surgical evaluation given the acute weakness. We discussed potential to do injections to help mitigate his pain as he is being evaluated by neurosurgery, but will wait until his MRI is completed to get a more up to date picture of his spine. He is to continue physical therapy for which he has already started.        Diagnoses and all orders for this visit:  Lumbar radiculopathy  -     MR Lumbar Spine w/o & w Contrast; Future  -     Adult Neurosurgery  Referral; Future  Acute right-sided low back pain without sciatica  -     Spine  Referral  Degeneration of intervertebral disc of lumbar region with discogenic back pain and lower extremity pain  -     MR Lumbar Spine w/o & w Contrast; Future  -     Adult Neurosurgery  Referral; Future  Lumbar post-laminectomy syndrome  -     MR Lumbar Spine w/o & w Contrast; Future  -     Adult Neurosurgery  Referral; Future       Patient is neurologically intact on exam. No myelopathic or red flag symptoms.    PLAN:  Reviewed spine anatomy and disease process. Discussed diagnosis and treatment options with the patient today. A shared decision making model was used. The patient's values and choices were respected. The following represents what was discussed and decided upon by the provider and the patient.    1. DIAGNOSTIC TESTS  MRI of lumbar spine was ordered for further characterization of soft tissues and/or neural compression    2. PHYSICAL THERAPY  Patient is already in PT or has  a pending referral to begin soon.  Discussed the importance of core strengthening, ROM, stretching exercises with the patient and how each of these entities is important in decreasing pain.  Explained to the patient that the purpose of physical therapy is to teach the patient a home exercise program.  These exercises need to be performed every day in order to decrease pain and prevent future occurrences of pain.  Likened it to brushing one's teeth.      3. MEDICATIONS:  Discussed multiple medication options today with patient. Discussed risks, side effects, and proper use of medications. Patient verbalized understanding.  No new medications ordered at this visit.    4. INTERVENTIONS:  Patient requires further imaging to assess what interventional options may be best for them.  Pending imaging results, would consider right L5 or S1 JUS    5. OTHER REFERRALS:  A referral was placed to Neurosurgery for surgical evaluation of patient's spine as their has been acute neurological changes with radicular symptoms and weakness. His pain has also been refractory to medications.     6. FOLLOW-UP  To review imaging results once imaging is completed  Patient advised to contact our office for earlier appointment if symptoms worsening or not improving, or any side effects are noticed.    Advised patient to call the [facility] if symptoms worsen or you have problems controlling bladder and bowel function.   ______________________________________________________________________    SUBJECTIVE:   Markell Garcia  is a 59 year old male who presents today for evaluation of acute onset right low back pain. He first noticed right hip pain over his trochanter around 3 months ago. It progressively migrated to the right side of his low back. Roughly 3 weeks ago he picked up his grandchild and walked down stairs. When he went to put him down, he noticed a shooting pain from his back down to his proximal hamstring. Radicular symptoms  "progressed down to his calf. He went to urgent care where he received prednisone, lidocaine patch, methocarbamol, and tordol shot. This was minimally effective. The pain is severe at night keeping him up. He is taking Tylenol 3 currently at night to help sleep. He also noticed in the last 2 weeks weakness in standing on his toes and has rolled his ankle a couple of times as he \"can't keep his foot out\".     Severity: 9/10   Laterality: RIGHT  Radiation: From right low back down to mid calf  Worse with: Bending/picking things up  Better with: Standing and being upright as possible  Numbness: Denies  Weakness: Standing on toes and everting foot.     Prior Surgeries: Right L5-S1 microdiscectomy 20-30 years ago.     -Treatment to Date: Medications, Physical therapy (current)    3/27/24 - Diaz - Left L4-5 and L5-S1 TFESI - 100% relief for 1-2 days then 10-15% relief      Oswestry (ALEE) Questionnaire        5/9/2025     4:08 PM   OSWESTRY DISABILITY INDEX   Count 10    Sum 23    Oswestry Score (%) 46 %        Patient-reported       Neck Disability Index:      5/9/2025     4:10 PM   Neck Disability Index (  Darian H. and Eugenie C. 1991. All rights reserved.; used with permission)   SECTION 1 - PAIN INTENSITY 0   SECTION 2 - PERSONAL CARE 0   SECTION 3 - LIFTING 0   SECTION 4 - READING 0   SECTION 5 - HEADACHES 1   SECTION 6 - CONCENTRATION 0   SECTION 7 - WORK 0   SECTION 8 - DRIVING 0   SECTION 9 - SLEEPING 0   SECTION 10 - RECREATION 0   Count 10    Sum 1    Raw Score: /50 1    Neck Disability Index Score: (%) 2 %        Patient-reported              -Medications:    Current Outpatient Medications   Medication Sig Dispense Refill    acetaminophen-codeine (TYLENOL #3) 300-30 MG per tablet Take 1 tablet by mouth every 8 hours as needed for severe pain. 8 tablet 0    alpha-lipoic acid 100 MG capsule Take 100 mg by mouth every morning.      Ascorbic Acid (VITAMIN C PO) Take 500 mg by mouth every morning.      " Cholecalciferol (VITAMIN D3 PO) Take 1,000 Units by mouth daily.      Coenzyme Q10 (COQ10 PO) Take 1 tablet by mouth every morning.      FOLIC ACID PO Take 1 mg by mouth every morning.      Glucosamine HCl (GLUCOSAMINE PO) Take 1 capsule by mouth every morning.      lidocaine (LIDODERM) 5 % patch Place 1 patch over 12 hours onto the skin every 24 hours for 10 days. To prevent lidocaine toxicity, patient should be patch free for 12 hrs daily. 10 patch 0    Multiple Vitamins-Minerals (ZINC PO) Take 1 capsule by mouth every morning. Zinc      Omega-3 Fatty Acids (FISH OIL PO) Take 1 capsule by mouth every morning.      pregabalin (LYRICA) 75 MG capsule Take 1 capsule (75 mg) by mouth at bedtime. 90 capsule 3    rosuvastatin (CRESTOR) 10 MG tablet Take 1 tablet (10 mg) by mouth daily. 90 tablet 3    sildenafil (VIAGRA) 100 MG tablet Take 1 tablet (100 mg) by mouth daily as needed (30-60 minutes before intercourse for ED). 30 tablet 11    aspirin 81 MG EC tablet Take 1 tablet (81 mg) by mouth 2 times daily. 60 tablet 0    doxycycline hyclate (VIBRA-TABS) 100 MG tablet Take 1 tablet (100 mg) by mouth 2 times daily. 28 tablet 0    omeprazole (PRILOSEC OTC) 20 MG EC tablet Take 20 mg by mouth daily as needed (heartburn). (Patient not taking: Reported on 5/13/2025)      predniSONE (DELTASONE) 20 MG tablet 2 tabs for 2 days, 1 tab for 2 days, 0.5 tab for 2 days 7 tablet 0     No current facility-administered medications for this visit.       No Known Allergies    Past Medical History:   Diagnosis Date    Constipation     ED (erectile dysfunction)     Gastroesophageal reflux disease without esophagitis     Hammertoe of right foot     Hypercholesteremia     Neuropathy         Patient Active Problem List   Diagnosis    Idiopathic peripheral neuropathy    Hypercholesteremia    Hallux malleus of right foot    ED (erectile dysfunction)    Gastroesophageal reflux disease without esophagitis    Pain of left lower leg    Neuropathic  "ulcer of right foot, limited to breakdown of skin (H)    Adenomatous colon polyp    Right hallux osteomyelitis (H)    Acute right-sided low back pain without sciatica    Lumbar radiculopathy       Past Surgical History:   Procedure Laterality Date    COLONOSCOPY N/A 11/22/2024    Procedure: Colonoscopy with polypectomy;  Surgeon: Karon Guevara MD;  Location: [facility] OR    DISCECTOMY LUMBAR MINIMALLY INVASIVE ONE LEVEL  2000    IRRIGATION AND DEBRIDEMENT TOE, COMBINED Right 1/15/2025    Procedure: Debridement and Irrigation of Right Hallux;  Surgeon: Camilo Ceja MD;  Location: [facility] OR    ROTATOR CUFF REPAIR RT/LT Left     ROTATOR CUFF REPAIR RT/LT Right        Family History   Problem Relation Age of Onset    Hammer toes Mother     Seizure Disorder Mother     Hyperlipidemia Father     Insulin Resistance Father     Parkinsonism Father     Hyperlipidemia Paternal Grandfather     Diabetes Type 2  Paternal Uncle     Peripheral Neuropathy Paternal Uncle     Coronary Artery Disease No family hx of     Cerebrovascular Disease No family hx of     Prostate Cancer No family hx of     Colon Cancer No family hx of        Reviewed past medical, surgical, and family history with patient found on new patient intake packet located in EMR Media tab.     SOCIAL HX: Reviewed    ROS: Specifically negative for bowel/bladder dysfunction, balance changes, headache, dizziness, foot drop, fevers, chills, appetite changes, nausea/vomiting, unexplained weight loss. Otherwise 13 systems reviewed are negative. Please see the patient's intake questionnaire from today for details.    OBJECTIVE:  /74 (BP Location: Left arm, Patient Position: Sitting, Cuff Size: Adult Large)   Pulse 69   Ht 6' 5\" (1.956 m)   Wt 235 lb (106.6 kg)   SpO2 96%   BMI 27.87 kg/m      PHYSICAL EXAMINATION:  --CONSTITUTIONAL: Vital signs as above. No acute distress. The patient is well nourished and well groomed.  --PSYCHIATRIC: The patient is awake, alert, " oriented to person, place, time and answering questions appropriately with clear speech. Appropriate mood and affect   --RESPIRATORY: Normal rhythm and effort. No abnormal accessory muscle breathing patterns noted.   --GROSS MOTOR: Easily arises from a seated position.  --LUMBAR SPINE: Inspection reveals no evidence of deformity. Range of motion is not limited in extension, lateral rotation, Limited in flexion (due to pain). Mild tenderness to palpation of lumbar paraspinals, no tenderness in spinous processes.  Positive SLR and seated slump on right  --HIPS: Full range of motion bilaterally. Negative ROBERT test.  --LOWER EXTREMITY MOTOR TESTING:  Hip flexion left 5/5, right 5/5  Knee extension left 5/5, right 5/5  Ankle dorsiflexors left 5/5, right 5/5  Ankle plantarflexors left 5/5, right 3+/5   Ankle Eversion left 5/5, right 4/5  Great toe extension left 5/5, right 5/5   Knee flexion left 5/5, right 5/5  --NEUROLOGIC: Sensation to lower extremities is intact bilaterally in L2-S1 dermatomes.  Negative Mancini's bilaterally. Reflexes intact in BLE, no clonus.  --VASCULAR: Warm upper limbs bilaterally. Capillary refill in the upper extremities is less than 1 second.    RESULTS: Available medical records from [facility] and any other outside records were reviewed today.     Imaging:  Available relevant imaging was personally reviewed and interpreted today. The images were shown to the patient and the findings were explained using a spine model.    MRI Lumbar Spine w/o Contrast 02/26/2024:  FINDINGS:   Alignment is significant for multilevel subtle grade I spondylolisthesis and mild levoconvex curvature of the lumbar spine. Bone marrow demonstrates scattered mixed degenerative endplate changes including mild edema (Modic I) from the L4-L5 disc joint   and mild fatty marrow change (Modic II) surrounding the L5-S1 disc joint. Conus medullaris is unremarkable terminating at the level of the mid L1 vertebral body.  Cauda equina is unremarkable. Bilateral sacroiliac joint degenerative change. No convincing   extraspinal abnormality.     L1-L2: Disc height maintained. No herniation. Mild bilateral facet arthropathy. No foraminal or spinal canal stenosis.     L2-L3: Disc height maintained. Minimal bulge with shallow foraminal components. Mild bilateral facet arthropathy. No right foraminal stenosis. Mild, if any, left foraminal stenosis. Mild, if any, spinal canal stenosis.      L3-L4: Mild disc height loss. Disc bulge with bilateral foraminal components. Mild to moderate bilateral facet arthropathy. Mild to moderate right foraminal stenosis. Mild, if any, left foraminal stenosis. Mild, if any, spinal canal stenosis.     L4-L5: Moderate disc height loss. Central disc extrusion extending inferiorly along the medial margins of the bilateral traversing L5 nerve roots within the lateral recesses. Mild to moderate bilateral facet arthropathy. Moderate to severe right   foraminal stenosis. Moderate left foraminal stenosis. Mild to moderate spinal canal stenosis related to the extrusion.     L5-S1: Prior right hemilaminectomy. Severe disc height loss. Disc bulge with annular fissuring and right central extrusion component along the medial margin of the traversing right S1 nerve root within the lateral recess (series 5 image 7, 8). Mild   bilateral facet arthropathy. Moderate bilateral foraminal stenosis. No spinal canal stenosis. Asymmetric effacement of the right lateral recess fat which could represent postoperative scar tissue (series 7 image 55).

## 2025-05-14 ENCOUNTER — PATIENT OUTREACH (OUTPATIENT)
Dept: CARE COORDINATION | Facility: CLINIC | Age: 60
End: 2025-05-14
Payer: COMMERCIAL

## 2025-05-21 ENCOUNTER — HOSPITAL ENCOUNTER (OUTPATIENT)
Dept: MRI IMAGING | Facility: CLINIC | Age: 60
Discharge: HOME | End: 2025-05-21
Attending: STUDENT IN AN ORGANIZED HEALTH CARE EDUCATION/TRAINING PROGRAM
Payer: COMMERCIAL

## 2025-05-21 DIAGNOSIS — M54.16 LUMBAR RADICULOPATHY: ICD-10-CM

## 2025-05-21 DIAGNOSIS — M51.362 DEGENERATION OF INTERVERTEBRAL DISC OF LUMBAR REGION WITH DISCOGENIC BACK PAIN AND LOWER EXTREMITY PAIN: ICD-10-CM

## 2025-05-21 DIAGNOSIS — M96.1 LUMBAR POST-LAMINECTOMY SYNDROME: ICD-10-CM

## 2025-05-21 PROCEDURE — A9585 GADOBUTROL INJECTION: HCPCS | Performed by: STUDENT IN AN ORGANIZED HEALTH CARE EDUCATION/TRAINING PROGRAM

## 2025-05-21 PROCEDURE — 255N000002 HC RX 255 OP 636: Performed by: STUDENT IN AN ORGANIZED HEALTH CARE EDUCATION/TRAINING PROGRAM

## 2025-05-21 PROCEDURE — 72158 MRI LUMBAR SPINE W/O & W/DYE: CPT

## 2025-05-21 RX ORDER — GADOBUTROL 604.72 MG/ML
10 INJECTION INTRAVENOUS ONCE
Status: COMPLETED | OUTPATIENT
Start: 2025-05-21 | End: 2025-05-21

## 2025-05-21 RX ADMIN — GADOBUTROL 10 ML: 604.72 INJECTION INTRAVENOUS at 08:30

## 2025-05-22 ENCOUNTER — RESULTS FOLLOW-UP (OUTPATIENT)
Dept: PHYSICAL MEDICINE AND REHAB | Facility: CLINIC | Age: 60
End: 2025-05-22
Payer: COMMERCIAL

## 2025-05-22 ENCOUNTER — TELEPHONE (OUTPATIENT)
Dept: PHYSICAL MEDICINE AND REHAB | Facility: CLINIC | Age: 60
End: 2025-05-22
Payer: COMMERCIAL

## 2025-05-22 NOTE — TELEPHONE ENCOUNTER
"Per message from [facility] Anthony Diaz M.D.: \"Updated lumbar MRI shows ongoing narrowing of nerve root outlets at L4-L5 and L5-S1 from disc protrusions with some impinging on the nerves themselves. Recommend we move ahead with surgical consultation and pending surgical recommendations we could also do a right L4-L5 and L5-S1 TFESI. Will await surgical recommendation first.\"    Phone call to patient to review results and provider's recommendations. Results given and explained. Did inform him that injection of right L4-L5 and L5-S1 TFESI is recommended per Dr. Diaz. He does want patient to see Dr. Andres peck for his input and recommendations. Stated understanding and appreciation for call. He plans to discuss this with neurosurgery on Tuesday.   "

## 2025-05-22 NOTE — PROGRESS NOTES
Updated lumbar MRI shows ongoing narrowing of nerve root outlets at L4-L5 and L5-S1 from disc protrusions with some impinging on the nerves themselves. Recommend we move ahead with surgical consultation and pending surgical recommendations we could also do a right L4-L5 and L5-S1 TFESI. Will await surgical recommendation first.

## 2025-05-26 ENCOUNTER — MYC MEDICAL ADVICE (OUTPATIENT)
Dept: FAMILY MEDICINE | Facility: CLINIC | Age: 60
End: 2025-05-26

## 2025-05-26 DIAGNOSIS — M54.50 ACUTE LOW BACK PAIN WITHOUT SCIATICA, UNSPECIFIED BACK PAIN LATERALITY: ICD-10-CM

## 2025-05-27 ENCOUNTER — OFFICE VISIT (OUTPATIENT)
Dept: NEUROSURGERY | Facility: CLINIC | Age: 60
End: 2025-05-27
Payer: COMMERCIAL

## 2025-05-27 VITALS — SYSTOLIC BLOOD PRESSURE: 122 MMHG | OXYGEN SATURATION: 98 % | HEART RATE: 60 BPM | DIASTOLIC BLOOD PRESSURE: 77 MMHG

## 2025-05-27 DIAGNOSIS — G62.9 NEUROPATHY: ICD-10-CM

## 2025-05-27 DIAGNOSIS — E78.00 HYPERCHOLESTEREMIA: ICD-10-CM

## 2025-05-27 DIAGNOSIS — M51.16 RADICULOPATHY DUE TO LUMBAR INTERVERTEBRAL DISC DISORDER: Primary | ICD-10-CM

## 2025-05-27 RX ORDER — OXYCODONE HYDROCHLORIDE 5 MG/1
5 TABLET ORAL EVERY 6 HOURS PRN
Qty: 12 TABLET | Refills: 0 | Status: SHIPPED | OUTPATIENT
Start: 2025-05-27

## 2025-05-27 RX ORDER — ROSUVASTATIN CALCIUM 10 MG/1
10 TABLET, COATED ORAL DAILY
Qty: 90 TABLET | Refills: 3 | Status: SHIPPED | OUTPATIENT
Start: 2025-05-27

## 2025-05-27 RX ORDER — PREGABALIN 75 MG/1
75 CAPSULE ORAL AT BEDTIME
Qty: 90 CAPSULE | Refills: 3 | Status: CANCELLED | OUTPATIENT
Start: 2025-05-27

## 2025-05-27 ASSESSMENT — PAIN SCALES - GENERAL: PAINLEVEL_OUTOF10: MODERATE PAIN (4)

## 2025-05-27 NOTE — NURSING NOTE
"Markell Garcia is a 59 year old male who presents for:  Chief Complaint   Patient presents with    RECHECK     MRI review and surgical consult   \"R sided LBP, radiating to R hip and lateral R calf\"   Pain range 4-7/10        Initial Vitals:  /77 (BP Location: Left arm, Patient Position: Sitting, Cuff Size: Adult Regular)   Pulse 60   SpO2 98%  Estimated body mass index is 27.87 kg/m  as calculated from the following:    Height as of 5/13/25: 6' 5\" (1.956 m).    Weight as of 5/13/25: 235 lb (106.6 kg). BP completed using cuff size: regular  Moderate Pain (4)    Herbert Olson    "

## 2025-05-27 NOTE — LETTER
5/27/2025      Markell Garcia  [address] [address]      Dear Colleague,    Thank you for referring your patient, Markell Garcia, to the [facility]. Please see a copy of my visit note below.    NEUROSURGERY FOLLOWUP  NOTE    Markell Garcia comes today in f/u with MRI lumbar spine on 5/21/2025.  Patient was evaluated 6 months ago on 7/1/2024 for left lower extremity pain.    He reports significant improvement in his left lower extremity symptoms following left knee cortisone injection.  He presented to the clinic today for evaluation of new right lower extremity pain radiating from his buttock along the lateral aspect of his thigh and leg which started approximately 3 months ago and weakness approximately 3 weeks ago.  He describes his pain as deep aching and sharp.  He describes his pain as 7 to 8 x 10 on VAS, aggravated with bending at night and relieved with movement and stretching.  He also reports numbness involving the anterolateral aspect of his right leg.  He also reports weakness involving his right foot and difficulty in performing toe walking.    Patient is a non-smoker and is not on antiplatelets or anticoagulants.     Patient is prediabetic and nonhypertensive with no other systemic diseases.     PHYSICAL EXAM:   Constitutional: /77 (BP Location: Left arm, Patient Position: Sitting, Cuff Size: Adult Regular)   Pulse 60   SpO2 98%      Mental Status: A & O in no acute distress.  Affect is appropriate.  Speech is fluent.  Recent and remote memory are intact.  Attention span and concentration are normal.     Motor: No pronator drift of upper extremity.   Normal bulk and tone all muscle groups of upper and lower extremities.       Delt Bi Tri Hand Flex/  Ext Iliopsoas Quadriceps Tibialis Anterior EHL Gastroc     C5 C6 C7 C8/T1 L2 L3 L4 L5 S1   R 5 5 5 5 5 5 5 4+ 4-   L 5 5 5 5 5 5 5 5 5        Sensory: Sensation intact bilaterally to light  touch.      Coordination: Antalgic gait with impaired tandem walking.    Toe walking impaired on the right.       Reflexes; supinator, biceps, triceps, knee/ ankle jerk intact.  No hoffmans/   no babinski/ clonus.    IMAGING:   I personally reviewed all radiographic images   5/21/2025 MRI lumbar spine:  IMPRESSION:  1.  Multilevel lumbar spondylosis without high-grade spinal canal stenosis. Mild spinal canal stenosis at L3-4 and L4-5.  2.  Moderate to severe bilateral neural foraminal stenosis at L4-5 and moderate bilateral neural foraminal stenosis at L5-S1 with abutment of the exiting nerve roots.  3.  Modic type I changes at the opposing L4-5 endplates.  4.  Interval decrease in the size of right subarticular extrusion at L4-5 with abutment of the descending right L5 nerve root.    4/15/2024 EMG/NCV studies:  interpretation: Abnormal study: There is electrodiagnostic evidence of:     1.  Electrodiagnostic findings are consistent with but not confirmatory for a length-dependent sensorimotor polyneuropathy, predominantly axonal.  This would be consistent with his known history of peripheral polyneuropathy.     2.  There is no electrodiagnostic evidence of lumbosacral radiculopathy, lumbosacral plexopathy, or focal neuropathy in the left lower extremity.        CONSULTATION ASSESSMENT AND PLAN:    Mr. Garcia is a 58-year-old right-handed gentleman with significant past medical history of GERD, hypercholesterolemia, peripheral neuropathy, right leg pain s/p right L5-S1 microdiscectomy 20 years ago initially presented to the clinic  for evaluation of left leg pain that started in December 2023 without preceding trauma.  Patient described that the epicenter of the pain is along the posterior aspect of his left knee and radiating superiorly and inferiorly.  Patient also describes that his left leg pain is different from what he had in his right leg 20 years ago for which he underwent microdiscectomy.  Patient was  evaluated in July 2020 for and reports significant improvement in his left lower extremity pain following left knee cortisone injection.    He is in the clinic today for evaluation of right lower extremity pain and foot weakness.  He started noticing right hip pain approximately 3 months ago and approximately 4 weeks ago he started noticing shooting pain from his back down to the proximal leg when he picked up his grand child and walk downstairs.  He went to urgent care where he received conservative treatment.  He is also started noticing weakness for approximately 2 weeks or so involving his right foot.      I explained the MRI lumbar spine findings to the patient and showed him the images.  I explained him the presence of degenerative disc disease at multiple levels primarily at L4-5 and L5-S1 with severe disc collapse at L5-S1.  I explained in the presence of moderate to severe lateral recess stenosis at L4-5.  I again discussed the management options including conservative treatment with physical therapy and nonsurgical interventions, decompression alone and decompression with fusion.    Given new onset symptoms involving his right lower extremity with right foot weakness, I would recommend right lumbar 4 lumbar 5 lateral recess decompression and microlumbar discectomy using Metrx tube.  I discussed the procedure in detail.  I also discussed the risk and benefits of the surgery including but not limited to bleeding, infection, injury to the nerve root, injury to the thecal sac, CSF leak, need for additional procedure including spine fusion, incomplete relief of symptoms, need for repeat surgery, need for rehabilitation, DVT/PE, MI and others.  After extensive discussion of the risk and benefits of surgery patient agreed to proceed.    We will schedule the surgery and give him a call.    Patient agreed with the plan.  All the questions were answered and patient sounded understanding.  He can contact us if there  are any further questions or concerns or worsening neurological deficits.I spent more than 20 minutes in this apt, examining the pt, reviewing the scans, reviewing notes from chart, discussing treatment options with risks and benefits and coordinating care. This note was created in part by the use of Dragon voice recognition system. Inadvertent grammatical errors and typographical errors may have occurred due to inherent limitation of voice recognition software.  Reasonable attempts made to avoid errors, but this document may contain an error not identified before finalizing.  Please contact me for any clarification needed.        Gerard Campos MD      CC:     Haritha Barton  [address] [place] [id number]      Again, thank you for allowing me to participate in the care of your patient.        Sincerely,        Gerard Campos MD    Electronically signed

## 2025-05-27 NOTE — TELEPHONE ENCOUNTER
Previous Rx was never filled or sold to pt per . [facility] at [address] faxed clinic asking for clarification. Sat on hold with pharmacy for approximately 30 minutes with no answer. Faxed response to pharmacy last week to call clinic directly if they need clarification and they never did. Pt is requesting to send prescription to new pharmacy.    Manuel LANDON, RN  05/27/25 9:04 AM

## 2025-05-27 NOTE — PATIENT INSTRUCTIONS
Dr. Campos would also like a Lumbar XR upright Flexion/Extension.    - to schedule: [phone]        Patient Instructions    Surgery scheduled at [facility] for Right L4-5 Microdiscectomy with Dr. Campos    Pre-Operative    Surgical risks: blood clots in the leg or lung, problems urinating, nerve damage, drainage from the incision, infection, stiffness  Pre-operative physical with primary care physician within 30 days of surgical date.  Likely same day procedure with discharge home day of surgery, may stay for 23 hour observation hospitalization for monitoring.  Shower procedure  Please shower with antimicrobial soap the night before surgery and morning of surgery. Please refer to showering instruction sheet in folder.  Eating/Drinking  Stop all solid foods 8 hours before surgery.  Keep drinking clear liquids until 4 hours before surgery  Clear liquids include water, clear juice, black coffee, or clear tea without milk, Gatorade, clear soda.  Medications  Hold Aspirin, NSAIDs (Advil/Ibuprofen, Indocin, Naproxen, Nuprin, Relafen/Nabumetone, Diclofenac, Meloxicam, Aleve, Celebrex) x 7 days prior to surgical date  Hold methotrexate, Xeljanz for 1-2 weeks prior to surgery and continue to hold 2 weeks post surgery.  You can take Tylenol (Acetaminophen) for pain, 1000 mg  Do not exceed 2400 mg per day  If you are on chronic pain medication (oxycodone, Percocet, hydrocodone, Vicodin, Norco, Dilaudid, morphine, MS Contin, naltrexone, Suboxone, etc) or have a pain contract we will reach out to your pain clinic to gather your most recent records and recommendations for pain management post-op. Please ask your provider who manages your chronic pain if they require you to schedule an office visit prior to surgery. Continue obtaining your pain medications from your current provider until surgery. Our team will manage your acute post-op pain in the hospital and during the recovery period. Your pain  team will continue to manage your chronic pain.  Any other medications prescribed, please discuss with your primary care provider at your pre-operative physical    You may NOT receive the COVID-19 vaccine 72 hours before or after surgery.    Pain Management    You will have post-operative incisional pain which may require pain medications and muscle relaxants. You will receive medication upon discharge.  We can provide pain medications for 2 weeks post-op. If you need refills after 2 weeks post-op, please contact your primary care provider team.   You may resume taking NSAIDs (ex. Ibuprofen, aleve, naproxen) 2 weeks after surgery.   Do NOT drive while taking narcotic pain medication  Do NOT drink alcohol while using any pain medication  You can utilize ice as needed (no longer than 20 minutes at one time)  Avoid putting heat on the incision    Incision Care    No submerging incision in water such as pools, hot tubs, baths for at least 8 weeks or until incision is healed  It is okay to shower after 48 hours, just pat the incision dry  Remove dressing as instructed upon discharge  Watch for signs of infection  Redness, swelling, warmth, drainage, and fever of 101 degrees or higher  Notify clinic [phone].    Activity Restrictions    For the first 6-8 weeks, no lifting > 10 pounds, limited bending, twisting, or overhead reaching.  Take stairs in moderation  Ok to walk as tolerated, take short frequent walks. You may gradually increase the distance as tolerated.  Avoid bed rest and prolonged sitting for longer than 30 minutes (change positions frequently while awake)  No contact sports until after follow up visit  No high impact activities such as; running/jogging, snowmobile or [place] riding or any other recreational vehicles  Please call the clinic if you develop any of the following symptoms:  Swelling and/or warmth in one or both legs  Pain or tenderness in your leg, ankle, foot, or arm  Red or discolored  skin    Post-Op Follow Up Appointments      2 week incision check with RN    6 week post op follow up visit with Physician Assistant     3 months post op with Dr. Campos     Please call to schedule follow up appointment at [phone]    Resources      If you are currently employed, you will need to be off work for 2-4 weeks for post op recovery and healing.    Please fax any FMLA/short term disability paperwork to [phone]    You may call our clinic when you'd like to return to work and we can provide a work letter    To allow staff adequate time to complete paperwork, please send as soon as possible    [facility]  Phone: [phone]  Fax: [phone]

## 2025-05-27 NOTE — H&P (VIEW-ONLY)
NEUROSURGERY FOLLOWUP  NOTE    Markell Garcia comes today in f/u with MRI lumbar spine on 5/21/2025.  Patient was evaluated 6 months ago on 7/1/2024 for left lower extremity pain.    He reports significant improvement in his left lower extremity symptoms following left knee cortisone injection.  He presented to the clinic today for evaluation of new right lower extremity pain radiating from his buttock along the lateral aspect of his thigh and leg which started approximately 3 months ago and weakness approximately 3 weeks ago.  He describes his pain as deep aching and sharp.  He describes his pain as 7 to 8 x 10 on VAS, aggravated with bending at night and relieved with movement and stretching.  He also reports numbness involving the anterolateral aspect of his right leg.  He also reports weakness involving his right foot and difficulty in performing toe walking.    Patient is a non-smoker and is not on antiplatelets or anticoagulants.     Patient is prediabetic and nonhypertensive with no other systemic diseases.     PHYSICAL EXAM:   Constitutional: /77 (BP Location: Left arm, Patient Position: Sitting, Cuff Size: Adult Regular)   Pulse 60   SpO2 98%      Mental Status: A & O in no acute distress.  Affect is appropriate.  Speech is fluent.  Recent and remote memory are intact.  Attention span and concentration are normal.     Motor: No pronator drift of upper extremity.   Normal bulk and tone all muscle groups of upper and lower extremities.       Delt Bi Tri Hand Flex/  Ext Iliopsoas Quadriceps Tibialis Anterior EHL Gastroc     C5 C6 C7 C8/T1 L2 L3 L4 L5 S1   R 5 5 5 5 5 5 5 4+ 4-   L 5 5 5 5 5 5 5 5 5        Sensory: Sensation intact bilaterally to light touch.      Coordination: Antalgic gait with impaired tandem walking.    Toe walking impaired on the right.       Reflexes; supinator, biceps, triceps, knee/ ankle jerk intact.  No hoffmans/   no babinski/ clonus.    IMAGING:   I personally reviewed  all radiographic images   5/21/2025 MRI lumbar spine:  IMPRESSION:  1.  Multilevel lumbar spondylosis without high-grade spinal canal stenosis. Mild spinal canal stenosis at L3-4 and L4-5.  2.  Moderate to severe bilateral neural foraminal stenosis at L4-5 and moderate bilateral neural foraminal stenosis at L5-S1 with abutment of the exiting nerve roots.  3.  Modic type I changes at the opposing L4-5 endplates.  4.  Interval decrease in the size of right subarticular extrusion at L4-5 with abutment of the descending right L5 nerve root.    4/15/2024 EMG/NCV studies:  interpretation: Abnormal study: There is electrodiagnostic evidence of:     1.  Electrodiagnostic findings are consistent with but not confirmatory for a length-dependent sensorimotor polyneuropathy, predominantly axonal.  This would be consistent with his known history of peripheral polyneuropathy.     2.  There is no electrodiagnostic evidence of lumbosacral radiculopathy, lumbosacral plexopathy, or focal neuropathy in the left lower extremity.        CONSULTATION ASSESSMENT AND PLAN:    Mr. Garcia is a 58-year-old right-handed gentleman with significant past medical history of GERD, hypercholesterolemia, peripheral neuropathy, right leg pain s/p right L5-S1 microdiscectomy 20 years ago initially presented to the clinic  for evaluation of left leg pain that started in December 2023 without preceding trauma.  Patient described that the epicenter of the pain is along the posterior aspect of his left knee and radiating superiorly and inferiorly.  Patient also describes that his left leg pain is different from what he had in his right leg 20 years ago for which he underwent microdiscectomy.  Patient was evaluated in July 2020 for and reports significant improvement in his left lower extremity pain following left knee cortisone injection.    He is in the clinic today for evaluation of right lower extremity pain and foot weakness.  He started noticing  right hip pain approximately 3 months ago and approximately 4 weeks ago he started noticing shooting pain from his back down to the proximal leg when he picked up his grand child and walk downstairs.  He went to urgent care where he received conservative treatment.  He is also started noticing weakness for approximately 2 weeks or so involving his right foot.      I explained the MRI lumbar spine findings to the patient and showed him the images.  I explained him the presence of degenerative disc disease at multiple levels primarily at L4-5 and L5-S1 with severe disc collapse at L5-S1.  I explained in the presence of moderate to severe lateral recess stenosis at L4-5.  I again discussed the management options including conservative treatment with physical therapy and nonsurgical interventions, decompression alone and decompression with fusion.    Given new onset symptoms involving his right lower extremity with right foot weakness, I would recommend right lumbar 4 lumbar 5 lateral recess decompression and microlumbar discectomy using Metrx tube.  I discussed the procedure in detail.  I also discussed the risk and benefits of the surgery including but not limited to bleeding, infection, injury to the nerve root, injury to the thecal sac, CSF leak, need for additional procedure including spine fusion, incomplete relief of symptoms, need for repeat surgery, need for rehabilitation, DVT/PE, MI and others.  After extensive discussion of the risk and benefits of surgery patient agreed to proceed.    We will schedule the surgery and give him a call.    Patient agreed with the plan.  All the questions were answered and patient sounded understanding.  He can contact us if there are any further questions or concerns or worsening neurological deficits.I spent more than 20 minutes in this apt, examining the pt, reviewing the scans, reviewing notes from chart, discussing treatment options with risks and benefits and coordinating  care. This note was created in part by the use of Dragon voice recognition system. Inadvertent grammatical errors and typographical errors may have occurred due to inherent limitation of voice recognition software.  Reasonable attempts made to avoid errors, but this document may contain an error not identified before finalizing.  Please contact me for any clarification needed.        Gerard Campos MD      CC:     Haritha Barton  [address] [place] [id number]

## 2025-05-27 NOTE — PROGRESS NOTES
NEUROSURGERY FOLLOWUP  NOTE    Markell Garcia comes today in f/u with MRI lumbar spine on 5/21/2025.  Patient was evaluated 6 months ago on 7/1/2024 for left lower extremity pain.    He reports significant improvement in his left lower extremity symptoms following left knee cortisone injection.  He presented to the clinic today for evaluation of new right lower extremity pain radiating from his buttock along the lateral aspect of his thigh and leg which started approximately 3 months ago and weakness approximately 3 weeks ago.  He describes his pain as deep aching and sharp.  He describes his pain as 7 to 8 x 10 on VAS, aggravated with bending at night and relieved with movement and stretching.  He also reports numbness involving the anterolateral aspect of his right leg.  He also reports weakness involving his right foot and difficulty in performing toe walking.    Patient is a non-smoker and is not on antiplatelets or anticoagulants.     Patient is prediabetic and nonhypertensive with no other systemic diseases.     PHYSICAL EXAM:   Constitutional: /77 (BP Location: Left arm, Patient Position: Sitting, Cuff Size: Adult Regular)   Pulse 60   SpO2 98%      Mental Status: A & O in no acute distress.  Affect is appropriate.  Speech is fluent.  Recent and remote memory are intact.  Attention span and concentration are normal.     Motor: No pronator drift of upper extremity.   Normal bulk and tone all muscle groups of upper and lower extremities.       Delt Bi Tri Hand Flex/  Ext Iliopsoas Quadriceps Tibialis Anterior EHL Gastroc     C5 C6 C7 C8/T1 L2 L3 L4 L5 S1   R 5 5 5 5 5 5 5 4+ 4-   L 5 5 5 5 5 5 5 5 5        Sensory: Sensation intact bilaterally to light touch.      Coordination: Antalgic gait with impaired tandem walking.    Toe walking impaired on the right.       Reflexes; supinator, biceps, triceps, knee/ ankle jerk intact.  No hoffmans/   no babinski/ clonus.    IMAGING:   I personally reviewed  all radiographic images   5/21/2025 MRI lumbar spine:  IMPRESSION:  1.  Multilevel lumbar spondylosis without high-grade spinal canal stenosis. Mild spinal canal stenosis at L3-4 and L4-5.  2.  Moderate to severe bilateral neural foraminal stenosis at L4-5 and moderate bilateral neural foraminal stenosis at L5-S1 with abutment of the exiting nerve roots.  3.  Modic type I changes at the opposing L4-5 endplates.  4.  Interval decrease in the size of right subarticular extrusion at L4-5 with abutment of the descending right L5 nerve root.    4/15/2024 EMG/NCV studies:  interpretation: Abnormal study: There is electrodiagnostic evidence of:     1.  Electrodiagnostic findings are consistent with but not confirmatory for a length-dependent sensorimotor polyneuropathy, predominantly axonal.  This would be consistent with his known history of peripheral polyneuropathy.     2.  There is no electrodiagnostic evidence of lumbosacral radiculopathy, lumbosacral plexopathy, or focal neuropathy in the left lower extremity.        CONSULTATION ASSESSMENT AND PLAN:    Mr. Garcia is a 58-year-old right-handed gentleman with significant past medical history of GERD, hypercholesterolemia, peripheral neuropathy, right leg pain s/p right L5-S1 microdiscectomy 20 years ago initially presented to the clinic  for evaluation of left leg pain that started in December 2023 without preceding trauma.  Patient described that the epicenter of the pain is along the posterior aspect of his left knee and radiating superiorly and inferiorly.  Patient also describes that his left leg pain is different from what he had in his right leg 20 years ago for which he underwent microdiscectomy.  Patient was evaluated in July 2020 for and reports significant improvement in his left lower extremity pain following left knee cortisone injection.    He is in the clinic today for evaluation of right lower extremity pain and foot weakness.  He started noticing  right hip pain approximately 3 months ago and approximately 4 weeks ago he started noticing shooting pain from his back down to the proximal leg when he picked up his grand child and walk downstairs.  He went to urgent care where he received conservative treatment.  He is also started noticing weakness for approximately 2 weeks or so involving his right foot.      I explained the MRI lumbar spine findings to the patient and showed him the images.  I explained him the presence of degenerative disc disease at multiple levels primarily at L4-5 and L5-S1 with severe disc collapse at L5-S1.  I explained in the presence of moderate to severe lateral recess stenosis at L4-5.  I again discussed the management options including conservative treatment with physical therapy and nonsurgical interventions, decompression alone and decompression with fusion.    Given new onset symptoms involving his right lower extremity with right foot weakness, I would recommend right lumbar 4 lumbar 5 lateral recess decompression and microlumbar discectomy using Metrx tube.  I discussed the procedure in detail.  I also discussed the risk and benefits of the surgery including but not limited to bleeding, infection, injury to the nerve root, injury to the thecal sac, CSF leak, need for additional procedure including spine fusion, incomplete relief of symptoms, need for repeat surgery, need for rehabilitation, DVT/PE, MI and others.  After extensive discussion of the risk and benefits of surgery patient agreed to proceed.    We will schedule the surgery and give him a call.    Patient agreed with the plan.  All the questions were answered and patient sounded understanding.  He can contact us if there are any further questions or concerns or worsening neurological deficits.I spent more than 20 minutes in this apt, examining the pt, reviewing the scans, reviewing notes from chart, discussing treatment options with risks and benefits and coordinating  care. This note was created in part by the use of Dragon voice recognition system. Inadvertent grammatical errors and typographical errors may have occurred due to inherent limitation of voice recognition software.  Reasonable attempts made to avoid errors, but this document may contain an error not identified before finalizing.  Please contact me for any clarification needed.        Gerard Campos MD      CC:     Haritha Barton  [address] [place] [id number]

## 2025-05-27 NOTE — NURSING NOTE
PRE-SURGERY EDUCATION  Reviewed pre- and post-operative instructions as outlined in the After Visit Summary/Patient Instructions with patient.   Surgery folder was given to patient    Patient Education Topic: Procedure with Dr. Campos   Learner(s): Patient  Knowledge Level: Basic  Readiness to Learn: Ready  Method:  Verbal explanation and Written material   Outcome: Able to verbalize instructions  Barriers to Learning: No barrier    Covid Testing: NA    STD/FMLA: No, retired   Job Description: Not applicable   Time Off: Not applicable      Patient had the opportunity for questions to be answered. Advised Patient to call clinic with any questions/concerns. Gave patient antibacterial soap for pre-surgery skin preparation.     Non-fusion SPINE PRE-SURGICAL CHECKLIST   Intervention/Diagnostic Meets Criteria Details   NDI/ALEE  Completed within the last 6 months Yes Confirmation of completion within last 6 months   Nicotine Status  Never   Currently using: Cigarettes and None Yes Non-fusion: no action needed     Physical Therapy   Completed within 6 months   Completed on the corresponding body part  Completed at least 4 weeks (unless provider documented that patient unable to tolerate PT) Yes  Records verified and located Internal     Injection  Completed within last 1 years  Completed on the corresponding body part   No  Records verified and located Internal    Injection not completed (do not need to take action)   Imaging  MRI or CT completed within 6 months Yes Records verified and located Internal   Chronic Pain or Pain contract  No No If yes: RN needs to collect Pain Provider name, organization, last office visit note, and connect with provider to share plan leading into surgery and planned post-op pain medication timeframe. For involved chronic pain patients, RN to update [facility] team using <dot>PAINMANAGEMENTSURGERY within separate telephone encounter    - pt plans to meet with PCP on 5/30 to discuss oxycodone 1 tab at  [facility] for pain control and sleep aid. Pt also on tizanidine at [facility] per Dr. Diaz

## 2025-05-29 ENCOUNTER — TELEPHONE (OUTPATIENT)
Dept: NEUROSURGERY | Facility: CLINIC | Age: 60
End: 2025-05-29

## 2025-05-29 ENCOUNTER — THERAPY VISIT (OUTPATIENT)
Age: 60
End: 2025-05-29
Payer: COMMERCIAL

## 2025-05-29 DIAGNOSIS — M54.16 LUMBAR RADICULOPATHY: ICD-10-CM

## 2025-05-29 DIAGNOSIS — M54.50 ACUTE RIGHT-SIDED LOW BACK PAIN WITHOUT SCIATICA: Primary | ICD-10-CM

## 2025-05-29 NOTE — TELEPHONE ENCOUNTER
Called patient regarding scheduling surgery and offered him 6/19. He accepted. Advised him I would get scheduled and call him back with surgery details.

## 2025-06-02 ENCOUNTER — OFFICE VISIT (OUTPATIENT)
Dept: PODIATRY | Facility: CLINIC | Age: 60
End: 2025-06-02
Payer: COMMERCIAL

## 2025-06-02 DIAGNOSIS — Z87.2 HISTORY OF FOOT ULCER: ICD-10-CM

## 2025-06-02 DIAGNOSIS — M20.42 HAMMER TOES OF BOTH FEET: ICD-10-CM

## 2025-06-02 DIAGNOSIS — L84 TYLOMA: ICD-10-CM

## 2025-06-02 DIAGNOSIS — B35.1 ONYCHOMYCOSIS: Primary | ICD-10-CM

## 2025-06-02 DIAGNOSIS — M20.41 HAMMER TOES OF BOTH FEET: ICD-10-CM

## 2025-06-02 PROCEDURE — 99213 OFFICE O/P EST LOW 20 MIN: CPT | Performed by: PODIATRIST

## 2025-06-02 RX ORDER — ECONAZOLE NITRATE 10 MG/G
CREAM TOPICAL DAILY
Qty: 85 G | Refills: 5 | Status: SHIPPED | OUTPATIENT
Start: 2025-06-02

## 2025-06-02 NOTE — NURSING NOTE
Markell Garcia's chief complaint for this visit includes:  Chief Complaint   Patient presents with    RECHECK     Left foot follow up, right big toe follow     PCP: Haritha Barton    Referring Provider:  Referred Larry, MD  No address on file    There were no vitals taken for this visit.  Data Unavailable     Do you need any medication refills at today's visit? NO    No Known Allergies    Palak Clemons LPN

## 2025-06-02 NOTE — LETTER
6/2/2025      Markell Garcia  [address] [facility] [address]      Dear Colleague,    Thank you for referring your patient, Markell Garcia, to the [facility]. Please see a copy of my visit note below.    Past Medical History:   Diagnosis Date    Constipation     ED (erectile dysfunction)     Gastroesophageal reflux disease without esophagitis     Hammertoe of right foot     Hypercholesteremia     Neuropathy      Patient Active Problem List   Diagnosis    Idiopathic peripheral neuropathy    Hypercholesteremia    Hallux malleus of right foot    ED (erectile dysfunction)    Gastroesophageal reflux disease without esophagitis    Pain of left lower leg    Neuropathic ulcer of right foot, limited to breakdown of skin (H)    Adenomatous colon polyp    Right hallux osteomyelitis (H)    Acute right-sided low back pain without sciatica    Lumbar radiculopathy     Past Surgical History:   Procedure Laterality Date    COLONOSCOPY N/A 11/22/2024    Procedure: Colonoscopy with polypectomy;  Surgeon: Karon Guevara MD;  Location: [facility] OR    DISCECTOMY LUMBAR MINIMALLY INVASIVE ONE LEVEL  2000    IRRIGATION AND DEBRIDEMENT TOE, COMBINED Right 1/15/2025    Procedure: Debridement and Irrigation of Right Hallux;  Surgeon: Camilo Ceja MD;  Location: [facility] OR    ROTATOR CUFF REPAIR RT/LT Left     ROTATOR CUFF REPAIR RT/LT Right      Social History     Socioeconomic History    Marital status:      Spouse name: Not on file    Number of children: Not on file    Years of education: Not on file    Highest education level: Not on file   Occupational History    Not on file   Tobacco Use    Smoking status: Never    Smokeless tobacco: Never   Vaping Use    Vaping status: Never Used   Substance and Sexual Activity    Alcohol use: Yes     Comment: Once a week, 2-3 beers at a time    Drug use: Never    Sexual activity: Yes     Partners: Female     Birth control/protection: Post-menopausal      Comment: exclusive with partner   Other Topics Concern    Not on file   Social History Narrative    Moved to [place]  from [place]    Lives with wife    Works in supply chain distribution, works remotely and travels frequently    One brother in [place], one in [place], [place]     Daughter is local            First wife  in 2012 of colon cancer      Social Drivers of Health     Financial Resource Strain: Low Risk  (2025)    Financial Resource Strain     Within the past 12 months, have you or your family members you live with been unable to get utilities (heat, electricity) when it was really needed?: No   Food Insecurity: Low Risk  (2025)    Food Insecurity     Within the past 12 months, did you worry that your food would run out before you got money to buy more?: No     Within the past 12 months, did the food you bought just not last and you didn t have money to get more?: No   Transportation Needs: Low Risk  (2025)    Transportation Needs     Within the past 12 months, has lack of transportation kept you from medical appointments, getting your medicines, non-medical meetings or appointments, work, or from getting things that you need?: No   Physical Activity: Insufficiently Active (2024)    Exercise Vital Sign     Days of Exercise per Week: 3 days     Minutes of Exercise per Session: 30 min   Stress: No Stress Concern Present (2024)    [employer] - Occupational Stress Questionnaire     Feeling of Stress : Not at all   Social Connections: Unknown (2024)    Social Connection and Isolation Panel [NHANES]     Frequency of Communication with Friends and Family: Not on file     Frequency of Social Gatherings with Friends and Family: Three times a week     Attends Anabaptist Services: Not on file     Active Member of Clubs or Organizations: Not on file     Attends Club or Organization Meetings: Not on file     Marital Status: Not on file   Interpersonal  Safety: Low Risk  (1/15/2025)    Interpersonal Safety     Do you feel physically and emotionally safe where you currently live?: Yes     Within the past 12 months, have you been hit, slapped, kicked or otherwise physically hurt by someone?: No     Within the past 12 months, have you been humiliated or emotionally abused in other ways by your partner or ex-partner?: No   Housing Stability: Low Risk  (5/25/2025)    Housing Stability     Do you have housing? : Yes     Are you worried about losing your housing?: No     Family History   Problem Relation Age of Onset    Hammer toes Mother     Seizure Disorder Mother     Hyperlipidemia Father     Insulin Resistance Father     Parkinsonism Father     Hyperlipidemia Paternal Grandfather     Diabetes Type 2  Paternal Uncle     Peripheral Neuropathy Paternal Uncle     Coronary Artery Disease No family hx of     Cerebrovascular Disease No family hx of     Prostate Cancer No family hx of     Colon Cancer No family hx of            Subjective findings- 59-year-old returns clinic for right hallux ulcer, hammertoes, tyloma causing pain left fifth MPJ, and thick toenails.  Relates he seen orthopedic surgery and had surgery on the toe and will be likely having surgery to straighten the toe in the future.  Relates he gets pain in the left fifth MPJ callus which she shaves down and uses moisturizing lotion on.  Relates he got custom foot orthotics and they did a cut out for the MPJs.  Relates his toenails are thick and discolored on the right foot.  Relates to no specific injuries.    Objective findings- DP and PT are 2 out of 4 bilaterally.  Has dorsal contracted digits 1 through 5 bilaterally.  Has dystrophic thick nails with subungual debris dystrophy discoloration right 1 2 and 3 and mildly left hallux.  Has subungual bruising left hallux nail and right second toenail.  Has hyperkeratotic tissue buildup with mild ecchymosis plantar left fifth MPJ and pain on palpation with no  erythema, no edema, no drainage, no odor, no calor.  Has minimal hyperkeratotic tissue buildup with ecchymosis distal right hallux with no erythema, no edema, no drainage, no odor, no calor, no pain on palpation.    Assessment and plan- Right Hallux ulcer is closed.  Hammertoes bilaterally.  Onychomycosis.  Tyloma left fifth MPJ causing pain.  Peripheral sensory Neuropathy.  Diagnosis and treatment options discussed with the patient.  Prescription for Econazole cream given and use discussed with the patient.  Continue the custom foot orthotics, the left orthotic appears to fit well with 1st and 5th MPJ cut outs, I added a metatarsal pad to the left orthotic and use discussed with him.  Discussed with him he can discuss with Dr. Ceja any surgery options for the left fifth MPJ as well.  He can let us know if he wants to do Lamisil oral medication treatment.  Previous notes reviewed.  Return to clinic and see me as needed.            Low to moderate level of medical decision making.        Again, thank you for allowing me to participate in the care of your patient.        Sincerely,        Ari Rowan DPM    Electronically signed

## 2025-06-02 NOTE — PROGRESS NOTES
Past Medical History:   Diagnosis Date    Constipation     ED (erectile dysfunction)     Gastroesophageal reflux disease without esophagitis     Hammertoe of right foot     Hypercholesteremia     Neuropathy      Patient Active Problem List   Diagnosis    Idiopathic peripheral neuropathy    Hypercholesteremia    Hallux malleus of right foot    ED (erectile dysfunction)    Gastroesophageal reflux disease without esophagitis    Pain of left lower leg    Neuropathic ulcer of right foot, limited to breakdown of skin (H)    Adenomatous colon polyp    Right hallux osteomyelitis (H)    Acute right-sided low back pain without sciatica    Lumbar radiculopathy     Past Surgical History:   Procedure Laterality Date    COLONOSCOPY N/A 11/22/2024    Procedure: Colonoscopy with polypectomy;  Surgeon: Karon Guevara MD;  Location: [facility] OR    DISCECTOMY LUMBAR MINIMALLY INVASIVE ONE LEVEL  2000    IRRIGATION AND DEBRIDEMENT TOE, COMBINED Right 1/15/2025    Procedure: Debridement and Irrigation of Right Hallux;  Surgeon: Camilo Ceja MD;  [facility]    ROTATOR CUFF REPAIR RT/LT Left     ROTATOR CUFF REPAIR RT/LT Right      Social History     Socioeconomic History    Marital status:      Spouse name: Not on file    Number of children: Not on file    Years of education: Not on file    Highest education level: Not on file   Occupational History    Not on file   Tobacco Use    Smoking status: Never    Smokeless tobacco: Never   Vaping Use    Vaping status: Never Used   Substance and Sexual Activity    Alcohol use: Yes     Comment: Once a week, 2-3 beers at a time    Drug use: Never    Sexual activity: Yes     Partners: Female     Birth control/protection: Post-menopausal     Comment: exclusive with partner   Other Topics Concern    Not on file   Social History Narrative    Moved to [place] 2022 from [place]    Lives with wife    Works in supply chain distribution, works remotely and travels frequently    One brother in  [place], one in [place], [place]     Daughter is local            First wife  in 2012 of colon cancer      Social Drivers of Health     Financial Resource Strain: Low Risk  (2025)    Financial Resource Strain     Within the past 12 months, have you or your family members you live with been unable to get utilities (heat, electricity) when it was really needed?: No   Food Insecurity: Low Risk  (2025)    Food Insecurity     Within the past 12 months, did you worry that your food would run out before you got money to buy more?: No     Within the past 12 months, did the food you bought just not last and you didn t have money to get more?: No   Transportation Needs: Low Risk  (2025)    Transportation Needs     Within the past 12 months, has lack of transportation kept you from medical appointments, getting your medicines, non-medical meetings or appointments, work, or from getting things that you need?: No   Physical Activity: Insufficiently Active (2024)    Exercise Vital Sign     Days of Exercise per Week: 3 days     Minutes of Exercise per Session: 30 min   Stress: No Stress Concern Present (2024)    [employer] - Occupational Stress Questionnaire     Feeling of Stress : Not at all   Social Connections: Unknown (2024)    Social Connection and Isolation Panel [NHANES]     Frequency of Communication with Friends and Family: Not on file     Frequency of Social Gatherings with Friends and Family: Three times a week     Attends [employer] Services: Not on file     Active Member of Clubs or Organizations: Not on file     Attends Club or Organization Meetings: Not on file     Marital Status: Not on file   Interpersonal Safety: Low Risk  (1/15/2025)    Interpersonal Safety     Do you feel physically and emotionally safe where you currently live?: Yes     Within the past 12 months, have you been hit, slapped, kicked or otherwise physically hurt by someone?: No      Within the past 12 months, have you been humiliated or emotionally abused in other ways by your partner or ex-partner?: No   Housing Stability: Low Risk  (5/25/2025)    Housing Stability     Do you have housing? : Yes     Are you worried about losing your housing?: No     Family History   Problem Relation Age of Onset    Hammer toes Mother     Seizure Disorder Mother     Hyperlipidemia Father     Insulin Resistance Father     Parkinsonism Father     Hyperlipidemia Paternal Grandfather     Diabetes Type 2  Paternal Uncle     Peripheral Neuropathy Paternal Uncle     Coronary Artery Disease No family hx of     Cerebrovascular Disease No family hx of     Prostate Cancer No family hx of     Colon Cancer No family hx of            Subjective findings- 59-year-old returns clinic for right hallux ulcer, hammertoes, tyloma causing pain left fifth MPJ, and thick toenails.  Relates he seen orthopedic surgery and had surgery on the toe and will be likely having surgery to straighten the toe in the future.  Relates he gets pain in the left fifth MPJ callus which she shaves down and uses moisturizing lotion on.  Relates he got custom foot orthotics and they did a cut out for the MPJs.  Relates his toenails are thick and discolored on the right foot.  Relates to no specific injuries.    Objective findings- DP and PT are 2 out of 4 bilaterally.  Has dorsal contracted digits 1 through 5 bilaterally.  Has dystrophic thick nails with subungual debris dystrophy discoloration right 1 2 and 3 and mildly left hallux.  Has subungual bruising left hallux nail and right second toenail.  Has hyperkeratotic tissue buildup with mild ecchymosis plantar left fifth MPJ and pain on palpation with no erythema, no edema, no drainage, no odor, no calor.  Has minimal hyperkeratotic tissue buildup with ecchymosis distal right hallux with no erythema, no edema, no drainage, no odor, no calor, no pain on palpation.    Assessment and plan- Right Hallux  ulcer is closed.  Hammertoes bilaterally.  Onychomycosis.  Tyloma left fifth MPJ causing pain.  Peripheral sensory Neuropathy.  Diagnosis and treatment options discussed with the patient.  Prescription for Econazole cream given and use discussed with the patient.  Continue the custom foot orthotics, the left orthotic appears to fit well with 1st and 5th MPJ cut outs, I added a metatarsal pad to the left orthotic and use discussed with him.  Discussed with him he can discuss with Dr. Ceja any surgery options for the left fifth MPJ as well.  He can let us know if he wants to do Lamisil oral medication treatment.  Previous notes reviewed.  Return to clinic and see me as needed.                                    Low to moderate level of medical decision making.

## 2025-06-03 ENCOUNTER — OFFICE VISIT (OUTPATIENT)
Dept: FAMILY MEDICINE | Facility: CLINIC | Age: 60
End: 2025-06-03
Payer: COMMERCIAL

## 2025-06-03 VITALS
WEIGHT: 233 LBS | SYSTOLIC BLOOD PRESSURE: 133 MMHG | DIASTOLIC BLOOD PRESSURE: 75 MMHG | HEART RATE: 66 BPM | TEMPERATURE: 97.3 F | HEIGHT: 77 IN | OXYGEN SATURATION: 96 % | BODY MASS INDEX: 27.51 KG/M2

## 2025-06-03 DIAGNOSIS — Z01.818 PRE-OP EXAM: Primary | ICD-10-CM

## 2025-06-03 PROBLEM — L97.511 NEUROPATHIC ULCER OF RIGHT FOOT, LIMITED TO BREAKDOWN OF SKIN (H): Status: RESOLVED | Noted: 2024-11-09 | Resolved: 2025-06-03

## 2025-06-03 PROBLEM — M86.9: Status: RESOLVED | Noted: 2024-12-26 | Resolved: 2025-06-03

## 2025-06-03 PROBLEM — M79.662 PAIN OF LEFT LOWER LEG: Status: RESOLVED | Noted: 2024-04-26 | Resolved: 2025-06-03

## 2025-06-03 NOTE — PATIENT INSTRUCTIONS
You're in great shape for surgery    - hold your ibuprofen and supplements for 1 week before surgery

## 2025-06-03 NOTE — PROGRESS NOTES
Preoperative Evaluation  [facility] [address], [address]  [facility] [address]  Phone: [phone]  Fax: [phone]  Primary Provider: Haritha Barton MD  Pre-op Performing Provider: Haritha Barton MD  Kemal 3, 2025             5/30/2025   Surgical Information   What procedure is being done? Discectomy   Facility or Hospital where procedure/surgery will be performed: [facility]   Who is doing the procedure / surgery? Dr. Campos   Date of surgery / procedure: June 19   Time of surgery / procedure: 10:45   Where do you plan to recover after surgery? at home with family     Fax number for surgical facility: Note does not need to be faxed, will be available electronically in Epic.    Assessment & Plan   Markell Garcia is a 59 year old year old male with a history of GERD and peripheral neuropathy who presents to clinic today for a pre-op.      The proposed surgical procedure is considered INTERMEDIATE risk.    Pre-op exam  Patient is generally in excellent cardiovascular health.  No additional work up needed      - No identified additional risk factors other than previously addressed    Recommendation  Approval given to proceed with proposed procedure, without further diagnostic evaluation.  Hold ibuprofen and supplements 1 week before surgery       Subjective   Markell is a 59 year old, presenting for the following:  Pre-Op Exam (Lower back surgery)      HPI: Planning for surgery.  Pain control is okay.            5/30/2025   Pre-Op Questionnaire   Have you ever had a heart attack or stroke? No   Have you ever had surgery on your heart or blood vessels, such as a stent placement, a coronary artery bypass, or surgery on an artery in your head, neck, heart, or legs? No   Do you have chest pain with activity? No   Do you have a history of heart failure? No   Do you currently have a cold, bronchitis or symptoms of other infection? No   Do you have a cough,  shortness of breath, or wheezing? No   Do you or anyone in your family have previous history of blood clots? No   Do you or does anyone in your family have a serious bleeding problem such as prolonged bleeding following surgeries or cuts? No   Have you ever had problems with anemia or been told to take iron pills? No   Have you had any abnormal blood loss such as black, tarry or bloody stools? No   Have you ever had a blood transfusion? No   Are you willing to have a blood transfusion if it is medically needed before, during, or after your surgery? Yes   Have you or any of your relatives ever had problems with anesthesia? No   Do you have sleep apnea, excessive snoring or daytime drowsiness? No   Do you have any artifical heart valves or other implanted medical devices like a pacemaker, defibrillator, or continuous glucose monitor? No   Do you have artificial joints? No   Are you allergic to latex? No     Advance Care Planning    Discussed advance care planning with patient; informed AVS has link to [employer].    Preoperative Review of    reviewed - controlled substances reflected in medication list.      Patient Active Problem List    Diagnosis Date Noted    Acute right-sided low back pain without sciatica 05/07/2025     Priority: Medium    Lumbar radiculopathy 05/07/2025     Priority: Medium    Adenomatous colon polyp 12/06/2024     Priority: Medium    Idiopathic peripheral neuropathy      Priority: Medium     - last saw neurology at [facility] 4/16/21, at that time was recommended to start folate supplement and get EMG/NCS    - 5/17/21 B12 1851, MMA 0.05, homocysteine 7  - 5/17/21 Folate 13.8  - 3/3/21 Folate 4.9    - 3/3/21 SPEP normal  - 3/3/21 UPEP w/ immunofixation - no monoclonal protein detected  - 3/3/21 Borrelia Ab negative  - 3/3/21  (RR <=397), ESR 10  - 3/3/21 YOVANA <1:80, RF <10, CCP IgG <3.0  - 3/3/21 Calcium 9.4, Albumin 4.79, Na 139, K 4.2, Mag 1.9  - 3/3/21 TSH 1.66  -  3/3/21 HIV nonreactive, TPA Ab nonreactive      Hypercholesteremia      Priority: Medium    Hallux malleus of right foot      Priority: Medium    ED (erectile dysfunction)      Priority: Medium    Gastroesophageal reflux disease without esophagitis      Priority: Medium      Past Medical History:   Diagnosis Date    Constipation     ED (erectile dysfunction)     Gastroesophageal reflux disease without esophagitis     Hammertoe of right foot     Hypercholesteremia     Neuropathic ulcer of right foot, limited to breakdown of skin (H) 11/09/2024    Neuropathy     Right hallux osteomyelitis (H) 12/26/2024     Past Surgical History:   Procedure Laterality Date    COLONOSCOPY N/A 11/22/2024    Procedure: Colonoscopy with polypectomy;  Surgeon: Karon Guevara MD;  Location: [facility] OR    DISCECTOMY LUMBAR MINIMALLY INVASIVE ONE LEVEL  2000    IRRIGATION AND DEBRIDEMENT TOE, COMBINED Right 1/15/2025    Procedure: Debridement and Irrigation of Right Hallux;  Surgeon: Camilo Ceja MD;  [facility]    ROTATOR CUFF REPAIR RT/LT Left     ROTATOR CUFF REPAIR RT/LT Right      Current Outpatient Medications   Medication Sig Dispense Refill    alpha-lipoic acid 100 MG capsule Take 100 mg by mouth every morning.      Ascorbic Acid (VITAMIN C PO) Take 500 mg by mouth every morning.      Cholecalciferol (VITAMIN D3 PO) Take 1,000 Units by mouth daily.      Coenzyme Q10 (COQ10 PO) Take 1 tablet by mouth every morning.      econazole nitrate 1 % external cream Apply topically daily. To affected toenails. 85 g 5    FOLIC ACID PO Take 1 mg by mouth every morning.      Glucosamine HCl (GLUCOSAMINE PO) Take 1 capsule by mouth every morning.      Multiple Vitamins-Minerals (ZINC PO) Take 1 capsule by mouth every morning. Zinc      Omega-3 Fatty Acids (FISH OIL PO) Take 1 capsule by mouth every morning.      oxyCODONE (ROXICODONE) 5 MG tablet Take 1 tablet (5 mg) by mouth every 6 hours as needed for pain. 12 tablet 0    rosuvastatin  "(CRESTOR) 10 MG tablet Take 1 tablet (10 mg) by mouth daily. 90 tablet 3    sildenafil (VIAGRA) 100 MG tablet Take 1 tablet (100 mg) by mouth daily as needed (30-60 minutes before intercourse for ED). 30 tablet 11    tiZANidine (ZANAFLEX) 2 MG tablet Take 1 tablet (2 mg) by mouth 3 times daily as needed for muscle spasms. 60 tablet 0       No Known Allergies     Social History     Tobacco Use    Smoking status: Never    Smokeless tobacco: Never   Substance Use Topics    Alcohol use: Yes     Comment: Once a week, 2-3 beers at a time       History   Drug Use Unknown             Objective    /75   Pulse 66   Temp 97.3  F (36.3  C)   Ht 1.956 m (6' 5.01\")   Wt 105.7 kg (233 lb)   SpO2 96%   BMI 27.62 kg/m     Estimated body mass index is 27.62 kg/m  as calculated from the following:    Height as of this encounter: 1.956 m (6' 5.01\").    Weight as of this encounter: 105.7 kg (233 lb).  Physical Exam  GENERAL: alert and no distress  RESP: lungs clear to auscultation - no rales, rhonchi or wheezes  CV: regular rate and rhythm, normal S1 S2, no S3 or S4, no murmur, click or rub, no peripheral edema  MS: no gross musculoskeletal defects noted, no edema    Recent Labs   Lab Test 12/13/24  1103      POTASSIUM 4.4   CR 0.93   A1C 5.6        Diagnostics  No labs were ordered during this visit.   No EKG required, no history of coronary heart disease, significant arrhythmia, peripheral arterial disease or other structural heart disease.    Revised Cardiac Risk Index (RCRI)  The patient has the following serious cardiovascular risks for perioperative complications:   - No serious cardiac risks = 0 points     RCRI Interpretation: 0 points: Class I (very low risk - 0.4% complication rate)       The longitudinal plan of care for the diagnosis(es)/condition(s) as documented were addressed during this visit. Due to the added complexity in care, I will continue to support Markell in the subsequent management and with " ongoing continuity of care.      Signed Electronically by: Haritha Barton MD  A copy of this evaluation report is provided to the requesting physician.

## 2025-06-06 ENCOUNTER — ANCILLARY PROCEDURE (OUTPATIENT)
Dept: GENERAL RADIOLOGY | Facility: CLINIC | Age: 60
End: 2025-06-06
Attending: NEUROLOGICAL SURGERY
Payer: COMMERCIAL

## 2025-06-06 DIAGNOSIS — M51.16 RADICULOPATHY DUE TO LUMBAR INTERVERTEBRAL DISC DISORDER: ICD-10-CM

## 2025-06-06 PROCEDURE — 72100 X-RAY EXAM L-S SPINE 2/3 VWS: CPT | Performed by: STUDENT IN AN ORGANIZED HEALTH CARE EDUCATION/TRAINING PROGRAM

## 2025-06-19 ENCOUNTER — HOSPITAL ENCOUNTER (OUTPATIENT)
Facility: CLINIC | Age: 60
Discharge: HOME | End: 2025-06-19
Attending: NEUROLOGICAL SURGERY | Admitting: NEUROLOGICAL SURGERY
Payer: COMMERCIAL

## 2025-06-19 ENCOUNTER — ANESTHESIA (OUTPATIENT)
Dept: SURGERY | Facility: CLINIC | Age: 60
End: 2025-06-19
Payer: COMMERCIAL

## 2025-06-19 ENCOUNTER — ANESTHESIA EVENT (OUTPATIENT)
Dept: SURGERY | Facility: CLINIC | Age: 60
End: 2025-06-19
Payer: COMMERCIAL

## 2025-06-19 ENCOUNTER — APPOINTMENT (OUTPATIENT)
Dept: GENERAL RADIOLOGY | Facility: CLINIC | Age: 60
End: 2025-06-19
Attending: NEUROLOGICAL SURGERY
Payer: COMMERCIAL

## 2025-06-19 VITALS
SYSTOLIC BLOOD PRESSURE: 121 MMHG | DIASTOLIC BLOOD PRESSURE: 61 MMHG | TEMPERATURE: 97.5 F | HEART RATE: 69 BPM | WEIGHT: 233 LBS | BODY MASS INDEX: 27.51 KG/M2 | HEIGHT: 77 IN | OXYGEN SATURATION: 95 % | RESPIRATION RATE: 12 BRPM

## 2025-06-19 DIAGNOSIS — M54.50 ACUTE LOW BACK PAIN WITHOUT SCIATICA, UNSPECIFIED BACK PAIN LATERALITY: ICD-10-CM

## 2025-06-19 LAB
ANION GAP SERPL CALCULATED.3IONS-SCNC: 13 MMOL/L (ref 7–15)
APTT PPP: 30 SECONDS (ref 22–38)
BASOPHILS # BLD AUTO: 0.1 10E3/UL (ref 0–0.2)
BASOPHILS NFR BLD AUTO: 1 %
BUN SERPL-MCNC: 20.9 MG/DL (ref 8–23)
CALCIUM SERPL-MCNC: 9.4 MG/DL (ref 8.8–10.4)
CHLORIDE SERPL-SCNC: 105 MMOL/L (ref 98–107)
CREAT SERPL-MCNC: 1.11 MG/DL (ref 0.67–1.17)
EGFRCR SERPLBLD CKD-EPI 2021: 76 ML/MIN/1.73M2
EOSINOPHIL # BLD AUTO: 0.1 10E3/UL (ref 0–0.7)
EOSINOPHIL NFR BLD AUTO: 1 %
ERYTHROCYTE [DISTWIDTH] IN BLOOD BY AUTOMATED COUNT: 11.8 % (ref 10–15)
GLUCOSE SERPL-MCNC: 109 MG/DL (ref 70–99)
HCO3 SERPL-SCNC: 24 MMOL/L (ref 22–29)
HCT VFR BLD AUTO: 42.8 % (ref 40–53)
HGB BLD-MCNC: 14.7 G/DL (ref 13.3–17.7)
IMM GRANULOCYTES # BLD: 0 10E3/UL
IMM GRANULOCYTES NFR BLD: 0 %
INR PPP: 1.15 (ref 0.85–1.15)
LYMPHOCYTES # BLD AUTO: 1.6 10E3/UL (ref 0.8–5.3)
LYMPHOCYTES NFR BLD AUTO: 25 %
MCH RBC QN AUTO: 30.7 PG (ref 26.5–33)
MCHC RBC AUTO-ENTMCNC: 34.3 G/DL (ref 31.5–36.5)
MCV RBC AUTO: 89 FL (ref 78–100)
MONOCYTES # BLD AUTO: 0.6 10E3/UL (ref 0–1.3)
MONOCYTES NFR BLD AUTO: 9 %
NEUTROPHILS # BLD AUTO: 4 10E3/UL (ref 1.6–8.3)
NEUTROPHILS NFR BLD AUTO: 64 %
NRBC # BLD AUTO: 0 10E3/UL
NRBC BLD AUTO-RTO: 0 /100
PLATELET # BLD AUTO: 251 10E3/UL (ref 150–450)
POTASSIUM SERPL-SCNC: 4 MMOL/L (ref 3.4–5.3)
PROTHROMBIN TIME: 14.4 SECONDS (ref 11.8–14.8)
RBC # BLD AUTO: 4.79 10E6/UL (ref 4.4–5.9)
SODIUM SERPL-SCNC: 142 MMOL/L (ref 135–145)
WBC # BLD AUTO: 6.3 10E3/UL (ref 4–11)

## 2025-06-19 PROCEDURE — 85730 THROMBOPLASTIN TIME PARTIAL: CPT | Performed by: PHYSICIAN ASSISTANT

## 2025-06-19 PROCEDURE — 710N000009 HC RECOVERY PHASE 1, LEVEL 1, PER MIN: Performed by: NEUROLOGICAL SURGERY

## 2025-06-19 PROCEDURE — 250N000011 HC RX IP 250 OP 636: Performed by: NURSE ANESTHETIST, CERTIFIED REGISTERED

## 2025-06-19 PROCEDURE — 250N000011 HC RX IP 250 OP 636: Performed by: PHYSICIAN ASSISTANT

## 2025-06-19 PROCEDURE — 250N000025 HC SEVOFLURANE, PER MIN: Performed by: NEUROLOGICAL SURGERY

## 2025-06-19 PROCEDURE — 250N000011 HC RX IP 250 OP 636: Performed by: ANESTHESIOLOGY

## 2025-06-19 PROCEDURE — 250N000005 HC OR RX SURGIFLO HEMOSTATIC MATRIX 10ML 199102S OPNP: Performed by: NEUROLOGICAL SURGERY

## 2025-06-19 PROCEDURE — 999N000179 XR SURGERY CARM FLUORO LESS THAN 5 MIN W STILLS

## 2025-06-19 PROCEDURE — 999N000141 HC STATISTIC PRE-PROCEDURE NURSING ASSESSMENT: Performed by: NEUROLOGICAL SURGERY

## 2025-06-19 PROCEDURE — 258N000003 HC RX IP 258 OP 636: Performed by: NURSE ANESTHETIST, CERTIFIED REGISTERED

## 2025-06-19 PROCEDURE — 80048 BASIC METABOLIC PNL TOTAL CA: CPT | Performed by: PHYSICIAN ASSISTANT

## 2025-06-19 PROCEDURE — 258N000003 HC RX IP 258 OP 636: Performed by: ANESTHESIOLOGY

## 2025-06-19 PROCEDURE — 85610 PROTHROMBIN TIME: CPT | Performed by: PHYSICIAN ASSISTANT

## 2025-06-19 PROCEDURE — 63030 LAMOT DCMPRN NRV RT 1 LMBR: CPT | Mod: RT | Performed by: NEUROLOGICAL SURGERY

## 2025-06-19 PROCEDURE — 370N000017 HC ANESTHESIA TECHNICAL FEE, PER MIN: Performed by: NEUROLOGICAL SURGERY

## 2025-06-19 PROCEDURE — 250N000009 HC RX 250: Performed by: NURSE ANESTHETIST, CERTIFIED REGISTERED

## 2025-06-19 PROCEDURE — 85004 AUTOMATED DIFF WBC COUNT: CPT | Performed by: PHYSICIAN ASSISTANT

## 2025-06-19 PROCEDURE — 250N000011 HC RX IP 250 OP 636: Performed by: NEUROLOGICAL SURGERY

## 2025-06-19 PROCEDURE — 360N000084 HC SURGERY LEVEL 4 W/ FLUORO, PER MIN: Performed by: NEUROLOGICAL SURGERY

## 2025-06-19 PROCEDURE — 250N000009 HC RX 250: Performed by: NEUROLOGICAL SURGERY

## 2025-06-19 PROCEDURE — 250N000013 HC RX MED GY IP 250 OP 250 PS 637: Performed by: PHYSICIAN ASSISTANT

## 2025-06-19 PROCEDURE — 36415 COLL VENOUS BLD VENIPUNCTURE: CPT | Performed by: PHYSICIAN ASSISTANT

## 2025-06-19 PROCEDURE — 272N000001 HC OR GENERAL SUPPLY STERILE: Performed by: NEUROLOGICAL SURGERY

## 2025-06-19 PROCEDURE — 710N000012 HC RECOVERY PHASE 2, PER MINUTE: Performed by: NEUROLOGICAL SURGERY

## 2025-06-19 RX ORDER — HYDROMORPHONE HCL IN WATER/PF 6 MG/30 ML
0.2 PATIENT CONTROLLED ANALGESIA SYRINGE INTRAVENOUS EVERY 5 MIN PRN
Refills: 0 | Status: DISCONTINUED | OUTPATIENT
Start: 2025-06-19 | End: 2025-06-19 | Stop reason: HOSPADM

## 2025-06-19 RX ORDER — OXYCODONE HYDROCHLORIDE 5 MG/1
5 TABLET ORAL EVERY 6 HOURS PRN
Qty: 40 TABLET | Refills: 0 | Status: SHIPPED | OUTPATIENT
Start: 2025-06-19 | End: 2025-06-19

## 2025-06-19 RX ORDER — SODIUM CHLORIDE, SODIUM LACTATE, POTASSIUM CHLORIDE, CALCIUM CHLORIDE 600; 310; 30; 20 MG/100ML; MG/100ML; MG/100ML; MG/100ML
INJECTION, SOLUTION INTRAVENOUS CONTINUOUS
Status: DISCONTINUED | OUTPATIENT
Start: 2025-06-19 | End: 2025-06-19 | Stop reason: HOSPADM

## 2025-06-19 RX ORDER — CEFAZOLIN SODIUM/WATER 2 G/20 ML
2 SYRINGE (ML) INTRAVENOUS SEE ADMIN INSTRUCTIONS
Status: DISCONTINUED | OUTPATIENT
Start: 2025-06-19 | End: 2025-06-19 | Stop reason: HOSPADM

## 2025-06-19 RX ORDER — DEXAMETHASONE SODIUM PHOSPHATE 4 MG/ML
4 INJECTION, SOLUTION INTRA-ARTICULAR; INTRALESIONAL; INTRAMUSCULAR; INTRAVENOUS; SOFT TISSUE
Status: COMPLETED | OUTPATIENT
Start: 2025-06-19 | End: 2025-06-19

## 2025-06-19 RX ORDER — OXYCODONE HYDROCHLORIDE 5 MG/1
10 TABLET ORAL EVERY 4 HOURS PRN
Status: DISCONTINUED | OUTPATIENT
Start: 2025-06-19 | End: 2025-06-19 | Stop reason: HOSPADM

## 2025-06-19 RX ORDER — OXYCODONE HYDROCHLORIDE 5 MG/1
5 TABLET ORAL EVERY 4 HOURS PRN
Status: DISCONTINUED | OUTPATIENT
Start: 2025-06-19 | End: 2025-06-19 | Stop reason: HOSPADM

## 2025-06-19 RX ORDER — ACETAMINOPHEN 325 MG/1
975 TABLET ORAL EVERY 8 HOURS
Status: DISCONTINUED | OUTPATIENT
Start: 2025-06-19 | End: 2025-06-19 | Stop reason: HOSPADM

## 2025-06-19 RX ORDER — METHOCARBAMOL 500 MG/1
500 TABLET, FILM COATED ORAL 4 TIMES DAILY PRN
Qty: 40 TABLET | Refills: 0 | Status: SHIPPED | OUTPATIENT
Start: 2025-06-19

## 2025-06-19 RX ORDER — ONDANSETRON 4 MG/1
4 TABLET, ORALLY DISINTEGRATING ORAL EVERY 30 MIN PRN
Status: DISCONTINUED | OUTPATIENT
Start: 2025-06-19 | End: 2025-06-19 | Stop reason: HOSPADM

## 2025-06-19 RX ORDER — ONDANSETRON 4 MG/1
4 TABLET, ORALLY DISINTEGRATING ORAL EVERY 6 HOURS PRN
Status: DISCONTINUED | OUTPATIENT
Start: 2025-06-19 | End: 2025-06-19 | Stop reason: HOSPADM

## 2025-06-19 RX ORDER — HYDROMORPHONE HCL IN WATER/PF 6 MG/30 ML
0.2 PATIENT CONTROLLED ANALGESIA SYRINGE INTRAVENOUS
Status: DISCONTINUED | OUTPATIENT
Start: 2025-06-19 | End: 2025-06-19 | Stop reason: HOSPADM

## 2025-06-19 RX ORDER — FENTANYL CITRATE 0.05 MG/ML
50 INJECTION, SOLUTION INTRAMUSCULAR; INTRAVENOUS EVERY 5 MIN PRN
Refills: 0 | Status: DISCONTINUED | OUTPATIENT
Start: 2025-06-19 | End: 2025-06-19 | Stop reason: HOSPADM

## 2025-06-19 RX ORDER — CEFAZOLIN SODIUM/WATER 2 G/20 ML
2 SYRINGE (ML) INTRAVENOUS
Status: DISCONTINUED | OUTPATIENT
Start: 2025-06-19 | End: 2025-06-19 | Stop reason: HOSPADM

## 2025-06-19 RX ORDER — NALOXONE HYDROCHLORIDE 0.4 MG/ML
0.1 INJECTION, SOLUTION INTRAMUSCULAR; INTRAVENOUS; SUBCUTANEOUS
Status: DISCONTINUED | OUTPATIENT
Start: 2025-06-19 | End: 2025-06-19 | Stop reason: HOSPADM

## 2025-06-19 RX ORDER — HYDROMORPHONE HCL IN WATER/PF 6 MG/30 ML
0.4 PATIENT CONTROLLED ANALGESIA SYRINGE INTRAVENOUS EVERY 5 MIN PRN
Refills: 0 | Status: DISCONTINUED | OUTPATIENT
Start: 2025-06-19 | End: 2025-06-19 | Stop reason: HOSPADM

## 2025-06-19 RX ORDER — ONDANSETRON 2 MG/ML
4 INJECTION INTRAMUSCULAR; INTRAVENOUS EVERY 30 MIN PRN
Status: DISCONTINUED | OUTPATIENT
Start: 2025-06-19 | End: 2025-06-19 | Stop reason: HOSPADM

## 2025-06-19 RX ORDER — GLYCOPYRROLATE 0.2 MG/ML
INJECTION, SOLUTION INTRAMUSCULAR; INTRAVENOUS PRN
Status: DISCONTINUED | OUTPATIENT
Start: 2025-06-19 | End: 2025-06-19

## 2025-06-19 RX ORDER — FENTANYL CITRATE 0.05 MG/ML
25 INJECTION, SOLUTION INTRAMUSCULAR; INTRAVENOUS EVERY 5 MIN PRN
Refills: 0 | Status: DISCONTINUED | OUTPATIENT
Start: 2025-06-19 | End: 2025-06-19 | Stop reason: HOSPADM

## 2025-06-19 RX ORDER — SENNA AND DOCUSATE SODIUM 50; 8.6 MG/1; MG/1
1 TABLET, FILM COATED ORAL 2 TIMES DAILY PRN
Qty: 40 TABLET | Refills: 0 | Status: SHIPPED | OUTPATIENT
Start: 2025-06-19

## 2025-06-19 RX ORDER — ONDANSETRON 2 MG/ML
4 INJECTION INTRAMUSCULAR; INTRAVENOUS EVERY 6 HOURS PRN
Status: DISCONTINUED | OUTPATIENT
Start: 2025-06-19 | End: 2025-06-19 | Stop reason: HOSPADM

## 2025-06-19 RX ORDER — PROCHLORPERAZINE MALEATE 10 MG
10 TABLET ORAL EVERY 6 HOURS PRN
Status: DISCONTINUED | OUTPATIENT
Start: 2025-06-19 | End: 2025-06-19 | Stop reason: HOSPADM

## 2025-06-19 RX ORDER — HYDROMORPHONE HCL IN WATER/PF 6 MG/30 ML
0.4 PATIENT CONTROLLED ANALGESIA SYRINGE INTRAVENOUS
Status: DISCONTINUED | OUTPATIENT
Start: 2025-06-19 | End: 2025-06-19 | Stop reason: HOSPADM

## 2025-06-19 RX ORDER — ACETAMINOPHEN 325 MG/1
975 TABLET ORAL ONCE
Status: COMPLETED | OUTPATIENT
Start: 2025-06-19 | End: 2025-06-19

## 2025-06-19 RX ORDER — OXYCODONE HYDROCHLORIDE 5 MG/1
5 TABLET ORAL EVERY 6 HOURS PRN
Qty: 40 TABLET | Refills: 0 | Status: SHIPPED | OUTPATIENT
Start: 2025-06-19

## 2025-06-19 RX ORDER — METHOCARBAMOL 750 MG/1
750 TABLET, FILM COATED ORAL EVERY 6 HOURS PRN
Status: DISCONTINUED | OUTPATIENT
Start: 2025-06-19 | End: 2025-06-19 | Stop reason: HOSPADM

## 2025-06-19 RX ORDER — CEFAZOLIN SODIUM/WATER 2 G/20 ML
2 SYRINGE (ML) INTRAVENOUS
Status: COMPLETED | OUTPATIENT
Start: 2025-06-19 | End: 2025-06-19

## 2025-06-19 RX ORDER — PROPOFOL 10 MG/ML
INJECTION, EMULSION INTRAVENOUS PRN
Status: DISCONTINUED | OUTPATIENT
Start: 2025-06-19 | End: 2025-06-19

## 2025-06-19 RX ADMIN — ROCURONIUM BROMIDE 50 MG: 50 INJECTION, SOLUTION INTRAVENOUS at 10:25

## 2025-06-19 RX ADMIN — GLYCOPYRROLATE 0.2 MG: 0.2 INJECTION, SOLUTION INTRAMUSCULAR; INTRAVENOUS at 11:25

## 2025-06-19 RX ADMIN — Medication 2 G: at 10:26

## 2025-06-19 RX ADMIN — PHENYLEPHRINE HYDROCHLORIDE 0.25 MCG/KG/MIN: 10 INJECTION INTRAVENOUS at 11:43

## 2025-06-19 RX ADMIN — DEXAMETHASONE SODIUM PHOSPHATE 4 MG: 4 INJECTION, SOLUTION INTRA-ARTICULAR; INTRALESIONAL; INTRAMUSCULAR; INTRAVENOUS; SOFT TISSUE at 10:45

## 2025-06-19 RX ADMIN — ROCURONIUM BROMIDE 20 MG: 50 INJECTION, SOLUTION INTRAVENOUS at 10:35

## 2025-06-19 RX ADMIN — MIDAZOLAM 2 MG: 1 INJECTION INTRAMUSCULAR; INTRAVENOUS at 10:13

## 2025-06-19 RX ADMIN — SODIUM CHLORIDE, SODIUM LACTATE, POTASSIUM CHLORIDE, AND CALCIUM CHLORIDE: .6; .31; .03; .02 INJECTION, SOLUTION INTRAVENOUS at 10:18

## 2025-06-19 RX ADMIN — PROPOFOL 200 MG: 10 INJECTION, EMULSION INTRAVENOUS at 10:24

## 2025-06-19 RX ADMIN — Medication 200 MG: at 12:29

## 2025-06-19 RX ADMIN — PHENYLEPHRINE HYDROCHLORIDE 100 MCG: 10 INJECTION INTRAVENOUS at 11:27

## 2025-06-19 RX ADMIN — SODIUM CHLORIDE, SODIUM LACTATE, POTASSIUM CHLORIDE, AND CALCIUM CHLORIDE: .6; .31; .03; .02 INJECTION, SOLUTION INTRAVENOUS at 12:29

## 2025-06-19 RX ADMIN — ONDANSETRON 4 MG: 2 INJECTION INTRAMUSCULAR; INTRAVENOUS at 10:45

## 2025-06-19 RX ADMIN — ACETAMINOPHEN 975 MG: 325 TABLET, FILM COATED ORAL at 09:38

## 2025-06-19 RX ADMIN — FENTANYL CITRATE 100 MCG: 50 INJECTION, SOLUTION INTRAMUSCULAR; INTRAVENOUS at 10:24

## 2025-06-19 RX ADMIN — HYDROMORPHONE HYDROCHLORIDE 0.5 MG: 1 INJECTION, SOLUTION INTRAMUSCULAR; INTRAVENOUS; SUBCUTANEOUS at 10:20

## 2025-06-19 RX ADMIN — PHENYLEPHRINE HYDROCHLORIDE 100 MCG: 10 INJECTION INTRAVENOUS at 11:15

## 2025-06-19 RX ADMIN — ROCURONIUM BROMIDE 20 MG: 50 INJECTION, SOLUTION INTRAVENOUS at 11:36

## 2025-06-19 RX ADMIN — PHENYLEPHRINE HYDROCHLORIDE 100 MCG: 10 INJECTION INTRAVENOUS at 11:35

## 2025-06-19 ASSESSMENT — ACTIVITIES OF DAILY LIVING (ADL)
ADLS_ACUITY_SCORE: 41

## 2025-06-19 ASSESSMENT — ENCOUNTER SYMPTOMS: SEIZURES: 0

## 2025-06-19 ASSESSMENT — LIFESTYLE VARIABLES: TOBACCO_USE: 0

## 2025-06-19 NOTE — TELEPHONE ENCOUNTER
[facility] Call Center    Phone Message    May a detailed message be left on voicemail: yes     Reason for Call: Medication Question or concern regarding medication   Prescription Clarification  Name of Medication: oxyCODONE (ROXICODONE) 5 MG tablet   Prescribing Provider: Andres    Pharmacy:    [id number] - [place], [place] - [address] AT [facility] [address] [address]      What on the order needs clarification? Patient's spouse called and stated that the medication is out of stock at the original pharmacy. Spouse requested for the medication to be sent to [facility] [address].       Action Taken: Message routed to:  Other: Neurosurgery    Travel Screening: Not Applicable     Date of Service:

## 2025-06-19 NOTE — ANESTHESIA PROCEDURE NOTES
Airway       Patient location during procedure: OR       Procedure Start/Stop Times: 6/19/2025 10:27 AM  Staff -        Performed By: CRNAIndications and Patient Condition       Indications for airway management: lupe-procedural       Induction type:intravenous       Mask difficulty assessment: 2 - vent by mask + OA or adjuvant +/- NMBA    Final Airway Details       Final airway type: endotracheal airway       Successful airway: ETT - single  Endotracheal Airway Details        ETT size (mm): 8.0       Cuffed: yes       Successful intubation technique: direct laryngoscopy       DL Blade Type: Ro 2       Grade View of Cords: 1       Adjucts: stylet       Position: Right       Measured from: gums/teeth       Secured at (cm): 24       Bite block used: None    Post intubation assessment        Placement verified by: capnometry, equal breath sounds and chest rise        Number of attempts at approach: 1       Number of other approaches attempted: 0       Secured with: tape       Ease of procedure: easy       Dentition: Intact and Unchanged    Medication(s) Administered   Medication Administration Time: 6/19/2025 10:27 AM

## 2025-06-19 NOTE — BRIEF OP NOTE
[facility]    Brief Operative Note    Pre-operative diagnosis: Radiculopathy due to lumbar intervertebral disc disorder [M51.16]  Post-operative diagnosis Same as pre-operative diagnosis    Procedure: RIGHT LUMBAR 4, LUMBAR 5 MICRODISCECTOMY, SPINE, LUMBAR, 1 LEVEL, POSTERIOR APPROACH, Right - Spine    Surgeon: Surgeons and Role:     * Gerard Campos MD - Primary     * Mary Kate Cisneros PA-C - Assisting  Anesthesia: General   Estimated Blood Loss: Less than 10 ml    Drains: None  Specimens: * No specimens in log *  Findings:   Satisfactory decompression of lateral recess and microlumbar discectomy achieved.  Complications: None.  Implants: * No implants in log *

## 2025-06-19 NOTE — OR NURSING
Dr Campos at bedside- agrees w this Nurses assessments- Pt dressed, up in recliner and transported to Phase 2. Must void before discharge to home

## 2025-06-19 NOTE — ANESTHESIA CARE TRANSFER NOTE
Patient: Markell Garcia    Procedure: Procedure(s):  RIGHT LUMBAR 4, LUMBAR 5 MICRODISCECTOMY, SPINE, LUMBAR, 1 LEVEL, POSTERIOR APPROACH       Diagnosis: Radiculopathy due to lumbar intervertebral disc disorder [M51.16]  Diagnosis Additional Information: No value filed.    Anesthesia Type:   General     Note:    Oropharynx: oropharynx clear of all foreign objects  Level of Consciousness: drowsy and awake  Oxygen Supplementation: face mask  Level of Supplemental Oxygen (L/min / FiO2): 8  Independent Airway: airway patency satisfactory and stable  Dentition: dentition unchanged  Vital Signs Stable: post-procedure vital signs reviewed and stable    Patient transferred to: Phase II    Handoff Report: Identifed the Patient, Identified the Reponsible Provider, Reviewed the pertinent medical history, Discussed the surgical course, Reviewed Intra-OP anesthesia mangement and issues during anesthesia, Set expectations for post-procedure period and Allowed opportunity for questions and acknowledgement of understanding      Vitals:  Vitals Value Taken Time   /85 06/19/25 12:39   Temp 35.9  C (96.62  F) 06/19/25 12:42   Pulse 77 06/19/25 12:42   Resp 12 06/19/25 12:42   SpO2 99 % 06/19/25 12:42   Vitals shown include unfiled device data.    Electronically Signed By: LARISSA Brooks CRNA  June 19, 2025  12:43 PM

## 2025-06-19 NOTE — DISCHARGE INSTRUCTIONS
Same Day Surgery Discharge Instructions for  Sedation and General Anesthesia     It's not unusual to feel dizzy, light-headed or faint for up to 24 hours after surgery or while taking pain medication.  If you have these symptoms: sit for a few minutes before standing and have someone assist you when you get up to walk or use the bathroom.    You should rest and relax for the next 24 hours. We recommend you make arrangements to have an adult stay with you for at least 24 hours after your discharge.  Avoid hazardous and strenuous activity.    DO NOT DRIVE any vehicle or operate mechanical equipment for 24 hours following the end of your surgery.  Even though you may feel normal, your reactions may be affected by the medication you have received.    Do not drink alcoholic beverages for 24 hours following surgery.     Slowly progress to your regular diet as you feel able. It's not unusual to feel nauseated and/or vomit after receiving anesthesia.  If you develop these symptoms, drink clear liquids (apple juice, ginger ale, broth, 7-up, etc. ) until you feel better.  If your nausea and vomiting persists for 24 hours, please notify your surgeon.      All narcotic pain medications, along with inactivity and anesthesia, can cause constipation. Drinking plenty of liquids and increasing fiber intake will help.    For any questions of a medical nature, call your surgeon.    Do not make important decisions for 24 hours.    If you had general anesthesia, you may have a sore throat for a couple of days related to the breathing tube used during surgery.  You may use Cepacol lozenges to help with this discomfort.  If it worsens or if you develop a fever, contact your surgeon.     If you feel your pain is not well managed with the pain medications prescribed by your surgeon, please contact your surgeon's office to let them know so they can address your concerns.       Reasons to contact your surgeon:    Signs of possible infection:  Check your incision daily for redness, swelling, warmth, red streaks or foul drainage.   Elevated temperature.  Pain not controlled with pain medication and/or rest.   Uncontrolled nausea or vomiting.  Any questions or concerns.      **If you have questions or concerns about your procedure,   call Dr. Campos at  **

## 2025-06-19 NOTE — OP NOTE
Date of surgery: June 19, 2025    Surgeon: Gerard Campos MD  Assistant:Mary Kate Cisneros PA-C - Assisting  (Note: The assistant was present for and assisted with the entire surgery, and his/her role as an assistant was crucial for aid in positioning, exposure, suctioning, retraction, and closure).  No qualified resident was available for the procedure.    Preoperative diagnosis:   L4-5 lumbar disc herniation with lateral recess stenosis    Postoperative diagnosis: Same    Procedure:  1.  Right L4-5 MIS lateral recess decompression and microdiscectomy  2.  Use of Medtronic Metrx minimally invasive system  3.  Use of intraoperative microscope and fluoroscopy    EBL: 10 mL    Indications: Mr. Garcia is a 58-year-old right-handed gentleman with significant past medical history of GERD, hypercholesterolemia, peripheral neuropathy, right leg pain s/p right L5-S1 microdiscectomy 20 years ago initially presented to the clinic for evaluation of left leg pain that started in December 2023 without preceding trauma.he presented with right lower extremity pain and foot weakness of approximately 3 months duration.  His MRI revealed right L4-5 disc herniation with moderate to severe lateral recess stenosis.   Underwent non-operative management with failure to improve.  Risks, benefits, indications, and alternatives were discussed with the patient and family in detail, and they wished to proceed.I discussed the risk and benefits of the procedure including bleeding, infection, injury to the thecal sac, injury to the nerve root, CSF leak, need for additional procedure, incomplete relief of symptoms, need for rehabilitation, DVT/PE, MI and others.  After extensive discussion of the risk and benefits of the procedure patient agreed to proceed with the surgery.     Description of surgery:   Patient was brought to the OR and general anesthesia was induced.  The patient was positioned prone on a Henri frame with appropriate padding  of pressure points.  Sterile prepping and draping procedures were performed.  Antibiotics were administered and timeout was performed.  A paramedian lumbar incision was performed approximately 15 mm from the midline.  The minimally invasive dilating system was used to dock on the hemilamina, 18/5 cm tube was used.  The microscope was brought into the field, and under microscopy, laminotomies and medial facetectomy were performed with the high speed drill.  The kerrison rongeur was used to remove the ligamentum flavum, and the thecal sac and nerve root were exposed.   The nerve root was retracted medially, and the annulus fibrosis was opened using a #15 knife.  Micro discectomy was performed using curettes, pituitary rongeurs.  The inferiorly migrated disc fragment was excised.  There was no free fragment of the disc that was identified.  There was significant bulging of the disc causing severe lateral recess stenosis.  Microdiscectomy was performed and the bulging annulus fibrosis was pushed back into the disc space to relieve lateral recess stenosis.  Nerve roots were well decompressed proximally and distally at the end of the decompression.  Hemostasis was achieved and antibiotic irrigation was performed.  The dermal layer was closed with 2-0 vicryl sutures.  The skin was closed with  Dermabond.  There were no intraprocedural complications. Patient was transferred to the PACU in stable condition.  Counts are correct x 2.     I updated patient's spouse after the surgery.    Gerard Campos MD

## 2025-06-19 NOTE — OR NURSING
Patient alert and oriented x 4.  VSS. Patient voided prior to discharge.  Discharge instructions read through with Patient and Family member (Lori-spouse).  Medications given to family member.  IV removed.  Patient escorted to door in a wheelchair with staff.

## 2025-06-19 NOTE — PROGRESS NOTES
Post op note    POD0 s/p Right L4-5 MIS lateral recess decompression and microdiscectomy with Dr. Campos    PLAN  Activity: up as tolerated. Please limit your lifting to no more that ten pounds and avoid excessive bending, twisting and turning at the lumbar spine. You should also avoid excessive jostling and jarring activities.     Meds:   Pain management    Incision cares:  Monitor wound for drainage  Replace dressing as needed, do not reinforce     Dispo:   Discharge today    Follow ups:   2 week incision check, 6 weeks, 12 weeks    Reviewed history, imaging and plans with Dr. Campos who was in agreement    Mary Kate Tobar PA-C  [facility]  [address] [place] [id number]  Tel [phone]  Fax [phone]  Text page via [facility] Paging/Directory

## 2025-06-19 NOTE — ANESTHESIA PREPROCEDURE EVALUATION
Anesthesia Pre-Procedure Evaluation    Patient: Markell Garcia   MRN: [id number] : 1965          Procedure : Procedure(s):  RIGHT LUMBAR 4, LUMBAR 5 MICRODISCECTOMY, SPINE, LUMBAR, 1 LEVEL, POSTERIOR APPROACH         Past Medical History:   Diagnosis Date    Constipation     ED (erectile dysfunction)     Gastroesophageal reflux disease without esophagitis     Hammertoe of right foot     Hypercholesteremia     Neuropathic ulcer of right foot, limited to breakdown of skin (H) 2024    Neuropathy     Right hallux osteomyelitis (H) 2024      Past Surgical History:   Procedure Laterality Date    COLONOSCOPY N/A 2024    Procedure: Colonoscopy with polypectomy;  Surgeon: Karon Guevara MD;  Location: [facility] OR    DISCECTOMY LUMBAR MINIMALLY INVASIVE ONE LEVEL  2000    IRRIGATION AND DEBRIDEMENT TOE, COMBINED Right 1/15/2025    Procedure: Debridement and Irrigation of Right Hallux;  Surgeon: Camilo Ceja MD;  Location: [facility] OR    ROTATOR CUFF REPAIR RT/LT Left     ROTATOR CUFF REPAIR RT/LT Right       No Known Allergies   Social History     Tobacco Use    Smoking status: Never    Smokeless tobacco: Never   Substance Use Topics    Alcohol use: Yes     Comment: Once a week, 2-3 beers at a time      Wt Readings from Last 1 Encounters:   25 105.7 kg (233 lb)        Anesthesia Evaluation   Pt has had prior anesthetic.     No history of anesthetic complications       ROS/MED HX  ENT/Pulmonary:    (-) tobacco use and sleep apnea   Neurologic:     (+)    peripheral neuropathy,                         (-) no seizures and no CVA   Cardiovascular:     (+) Dyslipidemia - -   -  - -                                   (-) hypertension   METS/Exercise Tolerance:     Hematologic:       Musculoskeletal:       GI/Hepatic:     (+) GERD,                (-) liver disease   Renal/Genitourinary:    (-) renal disease   Endo:    (-) Type II DM and thyroid disease   Psychiatric/Substance Use:       Infectious  "Disease:       Malignancy:       Other:              Physical Exam  Airway  Mallampati: II  TM distance: >3 FB  Neck ROM: full  Mouth opening: >= 4 cm    Cardiovascular - normal exam   Dental   (+) Modest Abnormalities - crowns, retainers, 1 or 2 missing teeth      Pulmonary - normal exam      Neurological - normal exam  He appears awake, alert and oriented x3.    Other Findings       OUTSIDE LABS:  CBC: No results found for: \"WBC\", \"HGB\", \"HCT\", \"PLT\"  BMP:   Lab Results   Component Value Date     12/13/2024     05/18/2022    POTASSIUM 4.4 12/13/2024    POTASSIUM 4.2 05/18/2022    CHLORIDE 105 12/13/2024    CHLORIDE 106 05/18/2022    CO2 28 12/13/2024    CO2 28 05/18/2022    BUN 14.0 12/13/2024    BUN 14 05/18/2022    CR 0.93 12/13/2024    CR 1.07 05/18/2022     (H) 12/13/2024    GLC 98 05/18/2022     COAGS: No results found for: \"PTT\", \"INR\", \"FIBR\"  POC: No results found for: \"BGM\", \"HCG\", \"HCGS\"  HEPATIC: No results found for: \"ALBUMIN\", \"PROTTOTAL\", \"ALT\", \"AST\", \"GGT\", \"ALKPHOS\", \"BILITOTAL\", \"BILIDIRECT\", \"GIA\"  OTHER:   Lab Results   Component Value Date    A1C 5.6 12/13/2024    JORDI 9.2 12/13/2024       Anesthesia Plan    ASA Status:  2      NPO Status: NPO Appropriate   Anesthesia Type: General.  Airway: oral.  Induction: intravenous.  Maintenance: Balanced.   Techniques and Equipment:     - Airway:  Planned airway equipment includes video laryngoscope.     - Monitoring Plan: standard ASA monitoring     Consents    Anesthesia Plan(s) and associated risks, benefits, and realistic alternatives discussed. Questions answered and patient/representative(s) expressed understanding.     - Discussed:     - Discussed with:  Patient               Postoperative Care    Pain management: multimodal analgesia.     Comments:                   Darryl Barnett MD    I have reviewed the pertinent notes and labs in the chart from the past 30 days and (re)examined the patient.  Any updates or changes " "from those notes are reflected in this note.    Clinically Significant Risk Factors Present on Admission                             # Overweight: Estimated body mass index is 27.63 kg/m  as calculated from the following:    Height as of this encounter: 1.956 m (6' 5\").    Weight as of this encounter: 105.7 kg (233 lb).                    "

## 2025-06-19 NOTE — ANESTHESIA POSTPROCEDURE EVALUATION
Patient: Markell Garcia    Procedure: Procedure(s):  RIGHT LUMBAR 4, LUMBAR 5 MICRODISCECTOMY, SPINE, LUMBAR, 1 LEVEL, POSTERIOR APPROACH       Anesthesia Type:  General    Note:  Disposition: Outpatient   Postop Pain Control: Uneventful            Sign Out: Well controlled pain   PONV: No   Neuro/Psych: Uneventful            Sign Out: Acceptable/Baseline neuro status   Airway/Respiratory: Uneventful            Sign Out: Acceptable/Baseline resp. status   CV/Hemodynamics: Uneventful            Sign Out: Acceptable CV status; No obvious hypovolemia; No obvious fluid overload   Other NRE: NONE   DID A NON-ROUTINE EVENT OCCUR? No           Last vitals:  Vitals Value Taken Time   /81 06/19/25 13:15   Temp 35.5  C (95.9  F) 06/19/25 13:22   Pulse 61 06/19/25 13:22   Resp 16 06/19/25 13:22   SpO2 99 % 06/19/25 13:22   Vitals shown include unfiled device data.    Electronically Signed By: Darryl Barnett MD  June 19, 2025  3:03 PM

## 2025-06-19 NOTE — TELEPHONE ENCOUNTER
Patient had surgery today. Oxycodone was sent to [facility] in [employer] [facility] [address]. Attempted to call pharmacy to confirm out of stock and cancel order. On hold for 15 minutes.    Spouse requesting order be sent to [facility] on [address]. Pended and routed to provider team.

## 2025-06-24 PROBLEM — M54.16 LUMBAR RADICULOPATHY: Status: RESOLVED | Noted: 2025-05-07 | Resolved: 2025-06-24

## 2025-06-24 PROBLEM — M54.50 ACUTE RIGHT-SIDED LOW BACK PAIN WITHOUT SCIATICA: Status: RESOLVED | Noted: 2025-05-07 | Resolved: 2025-06-24

## 2025-06-30 ENCOUNTER — MYC MEDICAL ADVICE (OUTPATIENT)
Dept: FAMILY MEDICINE | Facility: CLINIC | Age: 60
End: 2025-06-30

## 2025-07-02 ENCOUNTER — OFFICE VISIT (OUTPATIENT)
Dept: NEUROLOGY | Facility: CLINIC | Age: 60
End: 2025-07-02
Payer: COMMERCIAL

## 2025-07-02 VITALS
DIASTOLIC BLOOD PRESSURE: 80 MMHG | SYSTOLIC BLOOD PRESSURE: 126 MMHG | WEIGHT: 233 LBS | HEART RATE: 74 BPM | OXYGEN SATURATION: 99 % | BODY MASS INDEX: 27.63 KG/M2

## 2025-07-02 DIAGNOSIS — G62.9 NEUROPATHY: ICD-10-CM

## 2025-07-02 DIAGNOSIS — G56.02 CARPAL TUNNEL SYNDROME OF LEFT WRIST: Primary | ICD-10-CM

## 2025-07-02 NOTE — LETTER
7/2/2025       RE: Markell Garcia  [address] [address]     Dear Colleague,    Thank you for referring your patient, Markell Garcia, to the [facility] [place] [facility]. Please see a copy of my visit note below.    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  We discussed length of recovery for most injuries related to radiculopathy, and that this can vary from 6 months to 2 years.  In regards to neuropathy, I don't suspect there is major changes to the severity given his stable exam today, but due to reports of family history and some atrophy noted in intrinsic foot musculature, it may be reasonable to extend the w/up for neuropathy into a genetic path.  For management of paresthesias, the patient was to consider duloxetine given poor response to gabapentin, but will let me know if he wants to pursue this after going over the medication information given today.  There was an abnormal but mild finding of thumb adduction weakness on the left today, and this along with his reporting of sensory numbness in the right 2nd digit may be suspicious for CTS.  Instead of a new EMG, he will trial wrist brace therapy for 3 months to see if the symptoms improve.  If not, then he would consider a repeat EMG.    Plan:  - Genetics referral for hereditary neuropathy  - Consider duloxetine  - DME for wrist brace therapy, wearing on left wrist nightly for 3 months    Follow up in Neurology clinic in 3 months, or  earlier as needed should new concerns arise.    THOM Doyle D.O.   of Neurology    Total time today (38 min) in this patient encounter was spent on pre-charting, counseling and/or coordination of care. The longitudinal plan of care for the diagnosis(es)/condition(s) as documented were addressed during this visit. Due to the added complexity in care, I will continue to support Markell in the subsequent management and with ongoing continuity of care.       Prior History and Update:   The patient was initially seen in neurologic consultation on 12/4/2024 for evaluation of neuropathy. Please see the comprehensive neurologic consultation notes from those dates in the Epic records for details.     The patient had pertinent prior history as follows:  - prior lumbar micro-discectomy 20+ years prior  - Heavy alcohol abuse history, not continuous   - EMG 6/23/2022 showed length dependent sensorimotor axonal polyneuropathy of moderate severity and right lumbar radiculopathy (L4, S1, possibly L5) that were chronic   - EMG 4/15/2024 showed length dependent polyneuropathy but not radiculopathy or plexopathy  - MRI lumbar spine 2/26/2024 w/degenerative disc disease at multiple levels (L4-5, L5-S1) and collapse of disc space at L5-S1   - Exam showed evidence of polyneuropathy, serum studies were to be done, along with US for LEONARDO and medication trial of Lyrica.    Interval history:   - serum studies were normal 12/11/2024.  - MRI lumbar spine 5/21/2025 showed moderate to severe b/l neural foraminal stenosis at L4-5 and moderate b/l neural foraminal stenosis at L5-S1 abutting of exiting nerve roots.  - NSGY evaluated for new low back pain, 5/27/2025.  Weakness seen in L5 and S1 myotoms on the right. Antalgic gait reported. Conservative treatment versus decompression were discussed. Surgery was to be scheduled given new deficits and imaging update.  This occurred 6/19/2026, described as right lumbar 4/5  microdiscectomy posterior approach.    Today, the patient recalls that pregabalin led to him feeling sick to his stomach and not feeling well.  He otherwise was tolerating alpha-lipoic acid.  Up-unto his back surgery, he had stable neuropathy symptoms.  Since, he has noted a weird feeling in his left index finger (numb).  The feet related symptoms of tingling have returned, but not terrible by his report. His overall right leg pain is much improved since his surgery.     Physical Exam:   General: Seated comfortably in no acute distress.  Neurologic:     Mental Status: Fully alert, attentive and oriented. Speech clear and fluent, no paraphasic errors.     Cranial Nerves: EOMI with normal smooth pursuit. Facial movements symmetric. Hearing not formally tested but intact to conversation.  No dysarthria.     Motor: No tremors or other abnormal movements observed.   R/L  Upper:  Shoulder abduction, Deltoid (Axillary n), C5,6: 5/5  Elbow flexion, Bicep (Musculocutaneous n), C5,6: 5/5  Elbow extension, Tricep (Radial n), C6,7,8: 5/5  Wrist Flexion, Flexor carpi radialis, (Median), C6, 7: 5/5  Wrist Extension, Extensor carpi radialis longus (Radial), C6,7: 5/5  Finger Flexion, flexor digitorum profundus (median and ulnar), C7,8, T1: 5/5  Finger Flexion, flexor pollicis longus (Anterior interosseus n), C7, 8: 5/5  Finger Extension, Extensor digitorum communis (Posterior interosseus n), C7,8: 5/5  Finger Extension, extensor indicis proprius (radial), C8: 5/5  Finger abduction:   Abductor digiti minimi (ulnar), C8, T1: 5/5   Abductor pollicis brevis (median), C8, T1: 5/4+   Lower Extremities  Hip Flexion, Iliopsoas, L1.2: 5/5  Hip Adduction, Adductors, (Obdurator n), L2.3: 5/5  Knee Flexion, Hamstrings (Sciatic n), S1: 5/5  Knee Extension, Quadriceps (Femoral n), L3.4: 5/5  Ankle Dorsiflexion, Tibialis anterior (Deep peroneal n), L4: 4+/5  Ankle Plantarflexion, Gastrocnemius, Soleus (Tibial n), S1.2: 5/5  Inversion, in plantar  flexed position (posterior tibialis from tibial, L4-5): 5/5  Eversion in dorsiflexed position (anterior tibialis, peroneal, L4-5): 4+/5        Again, thank you for allowing me to participate in the care of your patient.      Sincerely,    Orlando Doyle, DO

## 2025-07-02 NOTE — PROGRESS NOTES
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  We discussed length of recovery for most injuries related to radiculopathy, and that this can vary from 6 months to 2 years.  In regards to neuropathy, I don't suspect there is major changes to the severity given his stable exam today, but due to reports of family history and some atrophy noted in intrinsic foot musculature, it may be reasonable to extend the w/up for neuropathy into a genetic path.  For management of paresthesias, the patient was to consider duloxetine given poor response to gabapentin, but will let me know if he wants to pursue this after going over the medication information given today.  There was an abnormal but mild finding of thumb adduction weakness on the left today, and this along with his reporting of sensory numbness in the right 2nd digit may be suspicious for CTS.  Instead of a new EMG, he will trial wrist brace therapy for 3 months to see if the symptoms improve.  If not, then he would consider a repeat EMG.    Plan:  - Genetics referral for hereditary neuropathy  - Consider duloxetine  - DME for wrist brace therapy, wearing on left wrist nightly for 3 months    Follow up in Neurology clinic in 3 months, or earlier as needed should new concerns arise.    THOM Doyle D.O.   of Neurology    Total time today (38 min) in this patient encounter was spent on pre-charting, counseling and/or coordination of care. The longitudinal plan of care for the diagnosis(es)/condition(s) as documented were addressed  during this visit. Due to the added complexity in care, I will continue to support Markell in the subsequent management and with ongoing continuity of care.       Prior History and Update:   The patient was initially seen in neurologic consultation on 12/4/2024 for evaluation of neuropathy. Please see the comprehensive neurologic consultation notes from those dates in the Epic records for details.     The patient had pertinent prior history as follows:  - prior lumbar micro-discectomy 20+ years prior  - Heavy alcohol abuse history, not continuous   - EMG 6/23/2022 showed length dependent sensorimotor axonal polyneuropathy of moderate severity and right lumbar radiculopathy (L4, S1, possibly L5) that were chronic   - EMG 4/15/2024 showed length dependent polyneuropathy but not radiculopathy or plexopathy  - MRI lumbar spine 2/26/2024 w/degenerative disc disease at multiple levels (L4-5, L5-S1) and collapse of disc space at L5-S1   - Exam showed evidence of polyneuropathy, serum studies were to be done, along with US for LEONARDO and medication trial of Lyrica.    Interval history:   - serum studies were normal 12/11/2024.  - MRI lumbar spine 5/21/2025 showed moderate to severe b/l neural foraminal stenosis at L4-5 and moderate b/l neural foraminal stenosis at L5-S1 abutting of exiting nerve roots.  - NSGY evaluated for new low back pain, 5/27/2025.  Weakness seen in L5 and S1 myotoms on the right. Antalgic gait reported. Conservative treatment versus decompression were discussed. Surgery was to be scheduled given new deficits and imaging update.  This occurred 6/19/2026, described as right lumbar 4/5 microdiscectomy posterior approach.    Today, the patient recalls that pregabalin led to him feeling sick to his stomach and not feeling well.  He otherwise was tolerating alpha-lipoic acid.  Up-unto his back surgery, he had stable neuropathy symptoms.  Since, he has noted a weird feeling in his left index finger (numb).  The  feet related symptoms of tingling have returned, but not terrible by his report. His overall right leg pain is much improved since his surgery.     Physical Exam:   General: Seated comfortably in no acute distress.  Neurologic:     Mental Status: Fully alert, attentive and oriented. Speech clear and fluent, no paraphasic errors.     Cranial Nerves: EOMI with normal smooth pursuit. Facial movements symmetric. Hearing not formally tested but intact to conversation.  No dysarthria.     Motor: No tremors or other abnormal movements observed.   R/L  Upper:  Shoulder abduction, Deltoid (Axillary n), C5,6: 5/5  Elbow flexion, Bicep (Musculocutaneous n), C5,6: 5/5  Elbow extension, Tricep (Radial n), C6,7,8: 5/5  Wrist Flexion, Flexor carpi radialis, (Median), C6, 7: 5/5  Wrist Extension, Extensor carpi radialis longus (Radial), C6,7: 5/5  Finger Flexion, flexor digitorum profundus (median and ulnar), C7,8, T1: 5/5  Finger Flexion, flexor pollicis longus (Anterior interosseus n), C7, 8: 5/5  Finger Extension, Extensor digitorum communis (Posterior interosseus n), C7,8: 5/5  Finger Extension, extensor indicis proprius (radial), C8: 5/5  Finger abduction:   Abductor digiti minimi (ulnar), C8, T1: 5/5   Abductor pollicis brevis (median), C8, T1: 5/4+   Lower Extremities  Hip Flexion, Iliopsoas, L1.2: 5/5  Hip Adduction, Adductors, (Obdurator n), L2.3: 5/5  Knee Flexion, Hamstrings (Sciatic n), S1: 5/5  Knee Extension, Quadriceps (Femoral n), L3.4: 5/5  Ankle Dorsiflexion, Tibialis anterior (Deep peroneal n), L4: 4+/5  Ankle Plantarflexion, Gastrocnemius, Soleus (Tibial n), S1.2: 5/5  Inversion, in plantar flexed position (posterior tibialis from tibial, L4-5): 5/5  Eversion in dorsiflexed position (anterior tibialis, peroneal, L4-5): 4+/5

## 2025-07-03 ENCOUNTER — ALLIED HEALTH/NURSE VISIT (OUTPATIENT)
Dept: NEUROSURGERY | Facility: CLINIC | Age: 60
End: 2025-07-03
Payer: COMMERCIAL

## 2025-07-03 DIAGNOSIS — Z98.890 S/P LUMBAR MICRODISCECTOMY: Primary | ICD-10-CM

## 2025-07-03 NOTE — PROGRESS NOTES
Post-op Nurse Visit:    Patient seen today per the order of  Gerard Campos MD.   DOS: 6/19/2025  Procedure: RIGHT LUMBAR 4, LUMBAR 5 MICRODISCECTOMY, SPINE, LUMBAR, 1 LEVEL, POSTERIOR APPROACH     Pain/Neuro Assessment  Minimally sore in incision. Pain in glute has improved.   Numbness/tingling: Had an episode of right lateral calf burning and soreness at HS. Then was resolved by am. This was sx prior to surgery.   Strength: Equal strength to bilateral lower extremities.   Weakness in right foot is improved     Pain Relief Measures:  Oxycodone: now stopped. Had been only taking at HS  Tylenol/alternating with Ibuprofen   Robaxin: as needed   Ice: as needed     Incision  Incision inspected. Edges well-approximated. No redness, drainage, or warmth noted. Minor bump swelling at the distal end of incision.     Activity  Following restrictions   Falls: none  Patient is walking frequently without difficulty.   Denies redness, swelling, or warmth to bilateral calves.   Wearing brace? No    GI/  Difficulty swallowing? Yes  Patient's appetite is normal  Bowel/bladder problems? No  Taking stool softeners? No     Refills/x-rays/return to work  Refills given at this appointment? No  Sent for x-rays after this appointment?   Ordered future x-rays? No  Scheduling team notified? Na. Did not have 6 wk appt. Brought pt to  to find appt. Let Pt know Dr Campos is leaving   Return to work discussed at this appointment? No concerns     All of patient's questions addressed today. Patient was instructed to call with any additional questions/concerns.

## 2025-07-03 NOTE — PATIENT INSTRUCTIONS
Instructions for Patient    Incision   Keep your incision clean and dry at all times.   It is okay to shower, just pat the incision dry   No submerging incision in water such as pools, hot tubs, or baths for at least 8 weeks and until the incision is healed  Do not apply lotions or ointments to incision    Activity  No lifting greater than 10 pounds. Limited bending, twisting, or overhead reaching.  Walking is the best way to start exercise after surgery. Take short frequent walks. You may gradually increase the distance as tolerated. If you feel pain, decrease your activity, but DO NOT stop walking. Walking will help you gain strength, prevent muscle soreness and spasms, and prevent blood clots.   Avoid bed rest and prolonged sitting for longer than 30 minutes (change positions frequently while awake)  No contact sports or high impact activities such as; snow removal, lawn mowing, running/jogging, snowmobile or 4 salinas riding or any other recreational vehicles until after given clearance at your 3 month follow up visit    Medications   Refills of pain medication:   Please call the neurosurgery clinic to request 2-3 days before you run out. A nurse will call you back to obtain a pain assessment.   Don't take more than 3000mg of Acetaminophen in 24 hours  Ok to begin taking Aspirin and NSAIDs (ex: ibuprofen/Advil)  Encouraged icing for at least 3-4 times throughout the day for 20-30 minutes at a time. Avoid heat to the incision area.   Taking stool softeners regularly can reduce constipation commonly caused by narcotic pain medications.    Contact clinic or Emergency Room if you develop:   Infection (redness, swelling, warmth, drainage, fever over 101 F)  New injury  Bladder or bowel changes or loss of control    Signs of blood clot:  Swelling and/or warmth in one or both legs  Pain or tenderness in your leg, ankle, foot, or arm   Red or discolored skin     Go to the Emergency Room   If sudden onset of severe  headache, weakness, confusion, change in level of consciousness, pain, or loss of movement.  Chest pain  Trouble breathing     Post-operative appointments  6 week and 3 months post-op.     [facility]  [facility]  [address] [place] [address]  Telephone:  [phone]  Fax:  [phone]

## 2025-07-14 ENCOUNTER — VIRTUAL VISIT (OUTPATIENT)
Dept: CONSULT | Facility: CLINIC | Age: 60
End: 2025-07-14
Attending: PSYCHIATRY & NEUROLOGY
Payer: COMMERCIAL

## 2025-07-14 DIAGNOSIS — Z71.83 ENCOUNTER FOR NONPROCREATIVE GENETIC COUNSELING AND TESTING: Primary | ICD-10-CM

## 2025-07-14 DIAGNOSIS — G62.9 NEUROPATHY: ICD-10-CM

## 2025-07-14 DIAGNOSIS — Z13.71 ENCOUNTER FOR NONPROCREATIVE GENETIC COUNSELING AND TESTING: Primary | ICD-10-CM

## 2025-07-14 PROCEDURE — 96041 GENETIC COUNSELING SVC EA 30: CPT | Mod: GT,95 | Performed by: GENETIC COUNSELOR, MS

## 2025-07-14 NOTE — Clinical Note
7/14/2025      RE: Markell Garcia  [address] [address]     Dear Colleague,    Thank you for the opportunity to participate in the care of your patient, Markell Garcia, at the [facility] at [facility]. Please see a copy of my visit note below.    No notes on file    Please do not hesitate to contact me if you have any questions/concerns.     Sincerely,       Raeann Carrillo GC

## 2025-07-29 NOTE — PROGRESS NOTES
[facility]  [address]  EXPLORER CLINIC  [address]  [facility] [address]  Phone: [phone]  Fax: [phone]    Patient:  Markell Garcia, Date of birth 1965  Date of Visit:  07/29/2025  Referring Provider Orlando Doyle    Assessment & Plan    Markell has a history of idiopathic peripheral neuropathy with possible family history of neuropathy.    1. Markell expressed verbal consent to move forward with genetic testing ([facility] Neuropathy Panel). Prior authorization (PA) will be initiated. A sample will need to be collected pending test approval. The family is aware that they will need schedule this blood work at an [facility] lab for testing to proceed.  2. We will contact Markell with the results of PA when they are available. He is aware this process can take several weeks to complete.  3. I will call Markell with the results of genetic testing when they are available. The expected turn around time is ~6-8 weeks. I will not leave the results of genetic testing via voicemail, regardless of outcome (positive/abnormal or negative/normal).  4. My contact information was provided to Markell in the event that they have additional questions in the interim.    Raeann Carrillo MS [facility]  Genetic Counselor  Email: [email or web address]  Phone: [phone]    40 minutes spent on the date of the encounter in chart review, patient visit, test coordination/review, documentation, and/or discussion with other providers about the issues documented above        Virtual Visit Details    Type of service:  Video Visit     Originating Location (pt. Location): Home    Distant Location (provider location):  On-site  Platform used for Video Visit: Well        Genetic Counseling Consultation Note    Presenting Information:  A consultation in the [facility] was requested for Markell, a 59 year  old male, for evaluation of neuropathy. This consultation was requested by Dr. Doyle in Adult Neurology at the patient's visit on 7/2/2025.    Markell attended this visit conducted by video alone.     History is obtained from Markell and the medical record. I met with the family to obtain a personal and family history, discuss possible genetic contributions to his symptoms, and to obtain informed consent for genetic testing.    Personal History:   Markell has a history of idiopathic peripheral neuropathy. The onset of Markell's neuropathy is estimated to be in his late 40s.     Patient Active Problem List   Diagnosis    Idiopathic peripheral neuropathy    Hypercholesteremia    Hallux malleus of right foot    ED (erectile dysfunction)    Gastroesophageal reflux disease without esophagitis    Adenomatous colon polyp     Past Medical History:   Diagnosis Date    Constipation     ED (erectile dysfunction)     Gastroesophageal reflux disease without esophagitis     Hammertoe of right foot     Hypercholesteremia     Neuropathic ulcer of right foot, limited to breakdown of skin (H) 11/09/2024    Neuropathy     Right hallux osteomyelitis (H) 12/26/2024     Relevant Imaging  EMG 6/23/2022  Abnormal study. There is electrodiagnostic evidence of 1) A length dependent, axonal sensorimotor polyneuropathy of moderate severity, and 2) Chronic right lumbar radiculopathies at L4 and S1 (and possibly L5).     Previous Genetic Testing  Markell denies a history of genetic testing.     Family History:   A standard three generation pedigree was obtained and is scanned into the medical record.  History pertinent to referral is underlined.  Children:   33 y/o daughter, healthy  32 y/o daughter, possible history of fibroids  Siblings:   Full siblings:   57 /o, history of bilateral knee replacement and possible cardiac arrhythmia  54 y/o, history of back surgery  Paternal:   Paternal ancestry is of , Scandinavian, and Haitian descent.  "  Father, BONNY SANZ: 79 y/o, history of Parkinson's disease diagnosed at 80 y/o  Paternal grandfather:  at 87 y/o d/t colon cancer  Paternal grandmother:  in 90s  Paternal aunts/uncles:   Uncle in late 80s with history of neuropathy (age of onset unknown)  64 y/o aunt, healthy  Uncle with history of Parkinson's disease  Uncle with history of throat cancer  Uncle for whom no health concerns are known  Paternal cousins: Numerous, one male cousin who is 41 y/o has neuropathy for which he uses orthotic devices   Maternal:   Maternal ancestry is of Scandinavian and [place] descent.  Mother: 80 y/o, history of balance issues which have led to falls  Maternal grandfather:  at 86 y/o, possibly due to a heart condition  Maternal grandmother:  \"young\" d/t unknown reasons  Maternal aunts/uncles:   Uncle who is  and had a history of Parkinson's disease  Uncle who is alive and well  Uncle (mother's paternal half sibling) who is alive and well    There are no additional reports of family members with hammertoes, high arches, neuropathy, congestive heart failure, cardiac arrhythmia, renal disease, carpal tunnel syndrome, or history of genetic testing/concern for genetic condition. Consanguinity is denied.     Genetic Counseling Discussion:  Our nervous system is composed of two parts: the central nervous system and the peripheral nervous system.  The central nervous system refers to the brain and spinal cord.  The peripheral nervous system refers to the nerves that branch off from the spinal cord and connect to all the parts of our body.  The central nervous system and peripheral nervous system are in constant communication, sending signals to and from each other.  Neuropathy is a medical condition that refers to the damage or reduced function of nerves.  One subset of neuropathy is peripheral neuropathy, referring to neuropathy of the peripheral nervous system.      Neuropathy can be due to many " causes, including both acquired forms and genetic forms.  Some examples of acquired causes of peripheral neuropathy are trauma, diabetes, and autoimmune disease.  However, there are countless other causes of acquired peripheral neuropathy.  Genetic causes of peripheral neuropathy are much rarer than acquired forms of neuropathy. When neuropathy is due to a genetic cause, there may or may not be a family history of neuropathy or other symptoms associated with the condition.    At this time, we are unable to target genetic testing for a specific condition or gene for Markell.  In situations like this, we recommend examining numerous genes associated with the presenting symptom.  For Markell, we are targeting genes associated with neuropathy.  We discussed the details, limitations, and possible outcomes of next generation sequencing gene panels.  There are three types of results:  Negative: meaning no pathogenic variants were identified in the genes that were tested  A negative result does not rule out the possibility that Markell's symptoms are due to a genetic etiology  Positive: meaning one (or more) pathogenic variants were identified in the genes that were tested that are associated with Markell's symptoms  We discussed that a positive result could provide an etiology to Markell's symptoms, give anticipatory guidance as to their potential progression, and clarify risks to family members.  We also discussed that a positive result could indicate that Markell is at risks for other health concerns, outside of their presenting symptoms  Uncertain: meaning one (or more) variants were identified in the genes that were tested, but there is not enough data to know if this variant is disease-causing; these are called variants of uncertain significance (VUS)  In most cases, identification of a VUS does not confirm a diagnosis and does not result in any clinically actionable recommendations.  The variant will be monitored over time to see if  more information is known about it in the future.  If a VUS is identified, testing of other relatives may be helpful to provide clarification.    We discussed the potential benefits of genetic testing and why this genetic testing is medically indicated. A positive result will help determine the etiology of neruopathy noted in Markell and could guide medical management for Markell.  If a genetic cause is found for Markell, it will give us a more accurate risk assessment for other family members.  Thus, the recommended testing for Markell  is DIAGNOSTIC testing, and it is NOT investigational.    References:  [employer] [place] of Neurological Disorders and Stroke (2020). Peripheral Neuropathy Fact Sheet. Retrieved 2020, from https://www.ninds.nih.gov/Disorders/Patient-Caregiver-Education/Fact-Sheets/Peripheral-Neuropathy-Fact-Sheet.    Gene List:  AARS, AIFM1, APOA1, ASAH1, ATL1, ATL3, AT, ATP7A, BAG3, BICD2, BSCL2, CHCHD10, COX6A1, SPK54Y9, CYP7B1, DCTN1, DHTKD1, DNAJB2, DNM2, DNMT1, DRP2, DST, HEEJ2O4, EGR2, ELP1, EXOSC9, FBLN5, FBXO38, FGD4, FIG4, DARA, GARS, GDAP1, GJB1, GLA, GNB4, GSN, HARS, HEXA, HINT1, HMBS, HSPB1, HSPB8, IGHMBP2, INF2, KIF1A, KIF1B, KIF5A, LITAF, LMNA, LRSAM1, MARS, MCM3AP, MED25, MFN2, MME, MORC2, MPZ, MTMR2, NAGLU, NDRG1, NEFH, NEFL, NGF, NTRK1, PDK3, PLEKHG5, PMP2, PMP22, POLG, POLG2, PRDM12, PRPS1, PRX, RAB7A, REEP1, RETREG1, SBF1, SBF2, SCN11A, SCN9A, SEPT9, SH3TC2, SIGMAR1, TUS06S4, FEM04V41, DQB42Y8, OPP15N1, SLC5A7, SPG11, SPTLC1, SPTLC2, SURF1, TFG, TRIM2, TRPV4, TTR, UBA1, VAPB, VCP, VRK1, WNK1, YARS

## 2025-07-30 ENCOUNTER — DOCUMENTATION ONLY (OUTPATIENT)
Dept: LAB | Facility: CLINIC | Age: 60
End: 2025-07-30

## 2025-07-30 ENCOUNTER — OFFICE VISIT (OUTPATIENT)
Dept: NEUROSURGERY | Facility: CLINIC | Age: 60
End: 2025-07-30
Payer: COMMERCIAL

## 2025-07-30 VITALS — DIASTOLIC BLOOD PRESSURE: 78 MMHG | HEART RATE: 60 BPM | OXYGEN SATURATION: 95 % | SYSTOLIC BLOOD PRESSURE: 120 MMHG

## 2025-07-30 DIAGNOSIS — Z98.890 S/P LUMBAR MICRODISCECTOMY: Primary | ICD-10-CM

## 2025-07-30 PROCEDURE — 3078F DIAST BP <80 MM HG: CPT | Performed by: NURSE PRACTITIONER

## 2025-07-30 PROCEDURE — 3074F SYST BP LT 130 MM HG: CPT | Performed by: NURSE PRACTITIONER

## 2025-07-30 PROCEDURE — 1126F AMNT PAIN NOTED NONE PRSNT: CPT | Performed by: NURSE PRACTITIONER

## 2025-07-30 PROCEDURE — 99024 POSTOP FOLLOW-UP VISIT: CPT | Performed by: NURSE PRACTITIONER

## 2025-07-30 ASSESSMENT — PAIN SCALES - GENERAL: PAINLEVEL_OUTOF10: NO PAIN (0)

## 2025-07-30 NOTE — LETTER
"7/30/2025      Markell Garcia  [address] [address]      Dear Colleague,    Thank you for referring your patient, Markell Garcia, to the [facility]. Please see a copy of my visit note below.    Neurosurgery Clinic 6-week follow up    Assessment:  Presents to clinic today status post right L4-5 microdiscectomy with Dr. Campos on June 19, 2025.  His initial symptoms were right lower extremity pain with foot weakness.  Patient states that he has had significant improvement in his symptoms postoperatively.  There have been a few instances where he is bent over to pick something up and he felt his symptoms returned briefly.  Stating that he \"overdid it\".  Is interested in beginning physical therapy to regain strength in his core and legs.    Exam:  Neurologically intact nonfocal exam.  Patient no longer endorsing right lower extremity weakness or radicular symptoms.  Incision well-healed at this point.  No concerns of infection.  Patient states that there was a knot under the incision immediately postoperatively but states that it has gone down significantly since that time.    Plan:  -Will arrange 12-week follow-up since patient's surgeon is moving to [place]  - Okay to resume previous levels of activity  - Okay to begin physical therapy sessions  - Okay to submerge incision    Jhony Ohara DNP  [address] [address]    Tel [phone]    Dragon Disclosure   This was created at least in part with a voice recognition software. Mistakes/typos may be present.        Again, thank you for allowing me to participate in the care of your patient.        Sincerely,        Jhony Ohara CNP    Electronically signed"

## 2025-07-30 NOTE — PROGRESS NOTES
"Neurosurgery Clinic 6-week follow up    Assessment:  Presents to clinic today status post right L4-5 microdiscectomy with Dr. Campos on June 19, 2025.  His initial symptoms were right lower extremity pain with foot weakness.  Patient states that he has had significant improvement in his symptoms postoperatively.  There have been a few instances where he is bent over to pick something up and he felt his symptoms returned briefly.  Stating that he \"overdid it\".  Is interested in beginning physical therapy to regain strength in his core and legs.    Exam:  Neurologically intact nonfocal exam.  Patient no longer endorsing right lower extremity weakness or radicular symptoms.  Incision well-healed at this point.  No concerns of infection.  Patient states that there was a knot under the incision immediately postoperatively but states that it has gone down significantly since that time.    Plan:  -Will arrange 12-week follow-up since patient's surgeon is moving to [place]  - Okay to resume previous levels of activity  - Okay to begin physical therapy sessions  - Okay to submerge incision    Jhony Ohara DNP  [address] [address]    Tel [phone]    Dragon Disclosure   This was created at least in part with a voice recognition software. Mistakes/typos may be present.      "

## 2025-07-30 NOTE — NURSING NOTE
"Makrell Garcia is a 59 year old male who presents for:  Chief Complaint   Patient presents with    RECHECK      Lumbar 6 wk f/up, DOS 6/19/25          Initial Vitals:  /78   Pulse 60   SpO2 95%  Estimated body mass index is 27.63 kg/m  as calculated from the following:    Height as of 6/19/25: 6' 5\" (1.956 m).    Weight as of 7/2/25: 233 lb (105.7 kg).. There is no height or weight on file to calculate BSA. BP completed using cuff size: large  No Pain (0)      Clarissa Brizuela   "

## 2025-08-04 ENCOUNTER — THERAPY VISIT (OUTPATIENT)
Age: 60
End: 2025-08-04
Payer: COMMERCIAL

## 2025-08-04 DIAGNOSIS — Z98.890 STATUS POST LUMBAR SURGERY: ICD-10-CM

## 2025-08-04 DIAGNOSIS — M54.16 LUMBAR RADICULOPATHY: ICD-10-CM

## 2025-08-04 DIAGNOSIS — R29.898 RIGHT LEG WEAKNESS: Primary | ICD-10-CM

## 2025-08-04 PROCEDURE — 97110 THERAPEUTIC EXERCISES: CPT | Mod: GP

## 2025-08-04 PROCEDURE — 97161 PT EVAL LOW COMPLEX 20 MIN: CPT | Mod: GP

## 2025-08-11 ENCOUNTER — THERAPY VISIT (OUTPATIENT)
Age: 60
End: 2025-08-11
Payer: COMMERCIAL

## 2025-08-11 DIAGNOSIS — M54.16 LUMBAR RADICULOPATHY: Primary | ICD-10-CM

## 2025-08-11 DIAGNOSIS — Z98.890 STATUS POST LUMBAR SURGERY: ICD-10-CM

## 2025-08-11 PROCEDURE — 97110 THERAPEUTIC EXERCISES: CPT | Mod: GP

## 2025-08-18 ENCOUNTER — THERAPY VISIT (OUTPATIENT)
Age: 60
End: 2025-08-18
Payer: COMMERCIAL

## 2025-08-18 DIAGNOSIS — R29.898 RIGHT LEG WEAKNESS: ICD-10-CM

## 2025-08-18 DIAGNOSIS — Z98.890 STATUS POST LUMBAR SURGERY: ICD-10-CM

## 2025-08-18 DIAGNOSIS — M54.16 LUMBAR RADICULOPATHY: Primary | ICD-10-CM

## 2025-08-18 PROCEDURE — 97110 THERAPEUTIC EXERCISES: CPT | Mod: GP

## 2025-08-25 ENCOUNTER — THERAPY VISIT (OUTPATIENT)
Age: 60
End: 2025-08-25
Payer: COMMERCIAL

## 2025-08-25 DIAGNOSIS — Z98.890 STATUS POST LUMBAR SURGERY: ICD-10-CM

## 2025-08-25 DIAGNOSIS — R29.898 RIGHT LEG WEAKNESS: ICD-10-CM

## 2025-08-25 DIAGNOSIS — M54.16 LUMBAR RADICULOPATHY: Primary | ICD-10-CM

## 2025-08-25 PROCEDURE — 97110 THERAPEUTIC EXERCISES: CPT | Mod: GP

## 2025-09-02 ENCOUNTER — OFFICE VISIT (OUTPATIENT)
Dept: NEUROSURGERY | Facility: CLINIC | Age: 60
End: 2025-09-02
Payer: COMMERCIAL

## 2025-09-02 VITALS — OXYGEN SATURATION: 96 % | DIASTOLIC BLOOD PRESSURE: 77 MMHG | HEART RATE: 68 BPM | SYSTOLIC BLOOD PRESSURE: 125 MMHG

## 2025-09-02 DIAGNOSIS — M54.16 LUMBAR RADICULOPATHY: Primary | ICD-10-CM

## 2025-09-02 PROCEDURE — 1125F AMNT PAIN NOTED PAIN PRSNT: CPT | Performed by: NEUROLOGICAL SURGERY

## 2025-09-02 PROCEDURE — 99024 POSTOP FOLLOW-UP VISIT: CPT | Performed by: NEUROLOGICAL SURGERY

## 2025-09-02 PROCEDURE — 3074F SYST BP LT 130 MM HG: CPT | Performed by: NEUROLOGICAL SURGERY

## 2025-09-02 PROCEDURE — 3078F DIAST BP <80 MM HG: CPT | Performed by: NEUROLOGICAL SURGERY

## 2025-09-02 ASSESSMENT — PAIN SCALES - GENERAL: PAINLEVEL_OUTOF10: MILD PAIN (1)

## 2025-09-04 ENCOUNTER — THERAPY VISIT (OUTPATIENT)
Age: 60
End: 2025-09-04
Payer: COMMERCIAL

## 2025-09-04 DIAGNOSIS — Z98.890 STATUS POST LUMBAR SURGERY: ICD-10-CM

## 2025-09-04 DIAGNOSIS — R29.898 RIGHT LEG WEAKNESS: ICD-10-CM

## 2025-09-04 DIAGNOSIS — M54.16 LUMBAR RADICULOPATHY: Primary | ICD-10-CM

## (undated) DEVICE — SUCTION MANIFOLD NEPTUNE 2 SYS 1 PORT 702-025-000

## (undated) DEVICE — DRSG GAUZE 4X4" 3033

## (undated) DEVICE — SOL NACL 0.9% IRRIG 1000ML BOTTLE 2F7124

## (undated) DEVICE — TAPE MEDIPORE 6"X10YD 2966

## (undated) DEVICE — DRAPE IOBAN INCISE 23X17" 6650EZ

## (undated) DEVICE — SYR BULB IRRIG DOVER 60 ML LATEX FREE 67000

## (undated) DEVICE — SOL WATER IRRIG 500ML BOTTLE 2F7113

## (undated) DEVICE — SOL WATER IRRIG 1000ML BOTTLE 2F7114

## (undated) DEVICE — SPONGE SURGIFOAM 50

## (undated) DEVICE — ESU GROUND PAD ADULT W/CORD E7507

## (undated) DEVICE — Device

## (undated) DEVICE — SUTURE VICRYL+ 2-0 CT-2 27" UND VCP269H

## (undated) DEVICE — TUBING SUCTION MEDI-VAC 1/4"X20' N620A

## (undated) DEVICE — KIT TURNOVER FAIRVIEW SOUTHDALE FULL SP3889

## (undated) DEVICE — GLOVE PROTEXIS BLUE W/NEU-THERA 7.5  2D73EB75

## (undated) DEVICE — SOL NACL 0.9% IRRIG 3000ML BAG 2B7477

## (undated) DEVICE — DRAPE MICROSCOPE LEICA 54X150" AR8033650

## (undated) DEVICE — TUBING IRRIG CYSTO/BLADDER SET 81" LF 2C4040

## (undated) DEVICE — DRAPE MAYO STAND 23X54 8337

## (undated) DEVICE — SURGICEL HEMOSTAT 2X3" 1953

## (undated) DEVICE — DRSG KERLIX FLUFFS X5

## (undated) DEVICE — PACK SPINE SM CUSTOM SNE15SSFSK

## (undated) DEVICE — KIT ENDO TURNOVER/PROCEDURE CARRY-ON 101822

## (undated) DEVICE — PREP CHLORAPREP 26ML TINTED HI-LITE ORANGE 930815

## (undated) DEVICE — PREP POVIDONE-IODINE 10% SOLUTION 4OZ BOTTLE MDS093944

## (undated) DEVICE — LINEN ORTHO PACK 5446

## (undated) DEVICE — SPECIMEN CONTAINER 3OZ W/FORMALIN 59901

## (undated) DEVICE — PACK LOWER EXTREMITY RIVERSIDE SOP32LEFSX

## (undated) DEVICE — GOWN XLG DISP 9545

## (undated) DEVICE — GLOVE BIOGEL PI MICRO INDICATOR UNDERGLOVE SZ 8.0 48980

## (undated) DEVICE — SU VICRYL 0 CT-1 CR 8X18" J740D

## (undated) DEVICE — DRAPE SHEET REV FOLD 3/4 9349

## (undated) DEVICE — GLOVE BIOGEL PI SZ 8.0 40880

## (undated) DEVICE — TRAY PREP DRY SKIN SCRUB 067

## (undated) DEVICE — POSITIONER PT PRONESAFE HEAD REST W/DERMAPROX INSERT 40599

## (undated) DEVICE — ESU PENCIL W/HOLSTER E2350H

## (undated) DEVICE — PACK UNIVERSAL SPLIT 29131

## (undated) DEVICE — PREP POVIDONE-IODINE 7.5% SCRUB 4OZ BOTTLE MDS093945

## (undated) DEVICE — ESU GROUND PAD UNIVERSAL W/O CORD

## (undated) DEVICE — CLIP HEMOSTATIC ASSURANCE W11 MM 00711882

## (undated) DEVICE — ENDO SNARE EXACTO COLD 9MM LOOP 2.4MMX230CM 00711115

## (undated) DEVICE — GOWN IMPERVIOUS 2XL BLUE

## (undated) DEVICE — SUCTION MANIFOLD NEPTUNE 2 SYS 4 PORT 0702-020-000

## (undated) DEVICE — NDL SPINAL 18GA 3.5" 405184

## (undated) DEVICE — SU VICRYL 2-0 CT-2 CR 8X18" J726D

## (undated) DEVICE — STRAP KNEE/BODY 31143004

## (undated) DEVICE — SU ETHILON 3-0 PS-1 18" 1663H

## (undated) DEVICE — SPONGE COTTONOID 1/2X1/2" 80-1400

## (undated) DEVICE — TOOL DISSECT MIDAS MR8 12CM TELESC MATCH 2.5 MR8-T12MH25

## (undated) DEVICE — KIT CULTURE TRANSPORT SYS A.C.T. II DUAL ANEROBE R124022

## (undated) DEVICE — SU MONOCRYL 4-0 PS-2 18" UND Y496G

## (undated) DEVICE — RX SURGIFLO HEMOSTATIC MATRIX 10ML 199102S

## (undated) DEVICE — LINEN TOWEL PACK X5 5464

## (undated) DEVICE — SPONGE LAP 18X18" X8435

## (undated) RX ORDER — HYDROMORPHONE HYDROCHLORIDE 1 MG/ML
INJECTION, SOLUTION INTRAMUSCULAR; INTRAVENOUS; SUBCUTANEOUS
Status: DISPENSED
Start: 2025-06-19

## (undated) RX ORDER — GLYCOPYRROLATE 0.2 MG/ML
INJECTION, SOLUTION INTRAMUSCULAR; INTRAVENOUS
Status: DISPENSED
Start: 2025-06-19

## (undated) RX ORDER — BUPIVACAINE HYDROCHLORIDE AND EPINEPHRINE 5; 5 MG/ML; UG/ML
INJECTION, SOLUTION EPIDURAL; INTRACAUDAL; PERINEURAL
Status: DISPENSED
Start: 2025-06-19

## (undated) RX ORDER — TRIAMCINOLONE ACETONIDE 40 MG/ML
INJECTION, SUSPENSION INTRA-ARTICULAR; INTRAMUSCULAR
Status: DISPENSED
Start: 2024-04-26

## (undated) RX ORDER — VANCOMYCIN HYDROCHLORIDE 1 G/20ML
INJECTION, POWDER, LYOPHILIZED, FOR SOLUTION INTRAVENOUS
Status: DISPENSED
Start: 2025-01-15

## (undated) RX ORDER — FENTANYL CITRATE 50 UG/ML
INJECTION, SOLUTION INTRAMUSCULAR; INTRAVENOUS
Status: DISPENSED
Start: 2025-01-15

## (undated) RX ORDER — FENTANYL CITRATE 50 UG/ML
INJECTION, SOLUTION INTRAMUSCULAR; INTRAVENOUS
Status: DISPENSED
Start: 2024-11-22

## (undated) RX ORDER — ACETAMINOPHEN 325 MG/1
TABLET ORAL
Status: DISPENSED
Start: 2025-01-15

## (undated) RX ORDER — LIDOCAINE HYDROCHLORIDE 10 MG/ML
INJECTION, SOLUTION EPIDURAL; INFILTRATION; INTRACAUDAL; PERINEURAL
Status: DISPENSED
Start: 2024-04-26

## (undated) RX ORDER — FENTANYL CITRATE 50 UG/ML
INJECTION, SOLUTION INTRAMUSCULAR; INTRAVENOUS
Status: DISPENSED
Start: 2025-06-19

## (undated) RX ORDER — PROPOFOL 10 MG/ML
INJECTION, EMULSION INTRAVENOUS
Status: DISPENSED
Start: 2025-06-19

## (undated) RX ORDER — CEFAZOLIN SODIUM/WATER 2 G/20 ML
SYRINGE (ML) INTRAVENOUS
Status: DISPENSED
Start: 2025-06-19

## (undated) RX ORDER — TOBRAMYCIN 1.2 G/30ML
INJECTION, POWDER, LYOPHILIZED, FOR SOLUTION INTRAVENOUS
Status: DISPENSED
Start: 2025-01-15

## (undated) RX ORDER — BUPIVACAINE HYDROCHLORIDE 2.5 MG/ML
INJECTION, SOLUTION INFILTRATION; PERINEURAL
Status: DISPENSED
Start: 2025-01-15

## (undated) RX ORDER — ACETAMINOPHEN 325 MG/1
TABLET ORAL
Status: DISPENSED
Start: 2025-06-19